# Patient Record
Sex: MALE | Race: WHITE | NOT HISPANIC OR LATINO | Employment: OTHER | URBAN - METROPOLITAN AREA
[De-identification: names, ages, dates, MRNs, and addresses within clinical notes are randomized per-mention and may not be internally consistent; named-entity substitution may affect disease eponyms.]

---

## 2024-03-08 ENCOUNTER — HOSPITAL ENCOUNTER (INPATIENT)
Facility: HOSPITAL | Age: 79
LOS: 11 days | Discharge: NURSING, REHAB OR LONG-TERM CARE | DRG: 811 | End: 2024-03-19
Attending: STUDENT IN AN ORGANIZED HEALTH CARE EDUCATION/TRAINING PROGRAM | Admitting: INTERNAL MEDICINE
Payer: MEDICARE

## 2024-03-08 ENCOUNTER — APPOINTMENT (OUTPATIENT)
Dept: RADIOLOGY | Facility: HOSPITAL | Age: 79
DRG: 811 | End: 2024-03-08
Payer: MEDICARE

## 2024-03-08 ENCOUNTER — APPOINTMENT (OUTPATIENT)
Dept: CARDIOLOGY | Facility: HOSPITAL | Age: 79
DRG: 811 | End: 2024-03-08
Payer: MEDICARE

## 2024-03-08 DIAGNOSIS — D64.9 NORMOCYTIC NORMOCHROMIC ANEMIA: ICD-10-CM

## 2024-03-08 DIAGNOSIS — M65.831 EXTENSOR TENOSYNOVITIS OF RIGHT WRIST: ICD-10-CM

## 2024-03-08 DIAGNOSIS — K92.1 GASTROINTESTINAL HEMORRHAGE WITH MELENA: Primary | ICD-10-CM

## 2024-03-08 DIAGNOSIS — I25.10 CORONARY ARTERY DISEASE, UNSPECIFIED VESSEL OR LESION TYPE, UNSPECIFIED WHETHER ANGINA PRESENT, UNSPECIFIED WHETHER NATIVE OR TRANSPLANTED HEART: Chronic | ICD-10-CM

## 2024-03-08 DIAGNOSIS — E11.9 INSULIN DEPENDENT TYPE 2 DIABETES MELLITUS (MULTI): ICD-10-CM

## 2024-03-08 DIAGNOSIS — J90 PLEURAL EFFUSION, BILATERAL: ICD-10-CM

## 2024-03-08 DIAGNOSIS — N17.9 AKI (ACUTE KIDNEY INJURY) (CMS-HCC): ICD-10-CM

## 2024-03-08 DIAGNOSIS — R60.0 LOCALIZED EDEMA: ICD-10-CM

## 2024-03-08 DIAGNOSIS — G47.00 INSOMNIA, UNSPECIFIED TYPE: ICD-10-CM

## 2024-03-08 DIAGNOSIS — N25.89 UREMIC ACIDOSIS: ICD-10-CM

## 2024-03-08 DIAGNOSIS — J90 BILATERAL PLEURAL EFFUSION: ICD-10-CM

## 2024-03-08 DIAGNOSIS — R13.12 DYSPHAGIA, OROPHARYNGEAL PHASE: ICD-10-CM

## 2024-03-08 DIAGNOSIS — E87.20 METABOLIC ACIDOSIS: ICD-10-CM

## 2024-03-08 DIAGNOSIS — R79.1 SUBTHERAPEUTIC INTERNATIONAL NORMALIZED RATIO (INR): ICD-10-CM

## 2024-03-08 DIAGNOSIS — K59.00 CONSTIPATION, UNSPECIFIED CONSTIPATION TYPE: ICD-10-CM

## 2024-03-08 DIAGNOSIS — Z79.4 INSULIN DEPENDENT TYPE 2 DIABETES MELLITUS (MULTI): ICD-10-CM

## 2024-03-08 DIAGNOSIS — M79.89 SWELLING OF EXTREMITY, RIGHT: ICD-10-CM

## 2024-03-08 DIAGNOSIS — I48.91 ATRIAL FIBRILLATION, UNSPECIFIED TYPE (MULTI): ICD-10-CM

## 2024-03-08 DIAGNOSIS — M79.601 PAIN IN RIGHT ARM: ICD-10-CM

## 2024-03-08 DIAGNOSIS — I10 HYPERTENSION, UNSPECIFIED TYPE: Chronic | ICD-10-CM

## 2024-03-08 PROBLEM — A52.8 LATE LATENT SYPHILIS: Chronic | Status: ACTIVE | Noted: 2024-02-26

## 2024-03-08 PROBLEM — B02.32: Status: ACTIVE | Noted: 2024-03-08

## 2024-03-08 PROBLEM — L03.90 CELLULITIS: Status: ACTIVE | Noted: 2024-02-22

## 2024-03-08 PROBLEM — N52.9 ERECTILE DYSFUNCTION: Status: ACTIVE | Noted: 2017-01-24

## 2024-03-08 PROBLEM — A49.1 STREPTOCOCCOSIS: Status: ACTIVE | Noted: 2024-03-08

## 2024-03-08 PROBLEM — B02.30 HERPES ZOSTER OPHTHALMICUS OF RIGHT EYE: Status: ACTIVE | Noted: 2024-03-08

## 2024-03-08 PROBLEM — D47.2 MGUS (MONOCLONAL GAMMOPATHY OF UNKNOWN SIGNIFICANCE): Status: ACTIVE | Noted: 2024-02-27

## 2024-03-08 PROBLEM — G20.A1 PARKINSON'S DISEASE (MULTI): Chronic | Status: ACTIVE | Noted: 2024-02-22

## 2024-03-08 PROBLEM — Z96.1 PSEUDOPHAKIA OF BOTH EYES: Status: ACTIVE | Noted: 2024-03-08

## 2024-03-08 PROBLEM — G62.9 POLYNEUROPATHY: Status: ACTIVE | Noted: 2023-01-16

## 2024-03-08 LAB
ABO GROUP (TYPE) IN BLOOD: NORMAL
ALBUMIN SERPL BCP-MCNC: 2.1 G/DL (ref 3.4–5)
ALP SERPL-CCNC: 52 U/L (ref 33–136)
ALT SERPL W P-5'-P-CCNC: 3 U/L (ref 10–52)
ANION GAP SERPL CALC-SCNC: 12 MMOL/L (ref 10–20)
ANTIBODY SCREEN: NORMAL
APTT PPP: 42 SECONDS (ref 27–38)
AST SERPL W P-5'-P-CCNC: 8 U/L (ref 9–39)
BASOPHILS # BLD AUTO: 0.03 X10*3/UL (ref 0–0.1)
BASOPHILS NFR BLD AUTO: 0.5 %
BILIRUB SERPL-MCNC: 0.3 MG/DL (ref 0–1.2)
BUN SERPL-MCNC: 69 MG/DL (ref 6–23)
CALCIUM SERPL-MCNC: 8.3 MG/DL (ref 8.6–10.3)
CHLORIDE SERPL-SCNC: 110 MMOL/L (ref 98–107)
CO2 SERPL-SCNC: 21 MMOL/L (ref 21–32)
CREAT SERPL-MCNC: 2.34 MG/DL (ref 0.5–1.3)
EGFRCR SERPLBLD CKD-EPI 2021: 28 ML/MIN/1.73M*2
EOSINOPHIL # BLD AUTO: 0.13 X10*3/UL (ref 0–0.4)
EOSINOPHIL NFR BLD AUTO: 2.1 %
ERYTHROCYTE [DISTWIDTH] IN BLOOD BY AUTOMATED COUNT: 15.6 % (ref 11.5–14.5)
GLUCOSE SERPL-MCNC: 84 MG/DL (ref 74–99)
HCT VFR BLD AUTO: 23.3 % (ref 41–52)
HGB BLD-MCNC: 7.2 G/DL (ref 13.5–17.5)
IMM GRANULOCYTES # BLD AUTO: 0.02 X10*3/UL (ref 0–0.5)
IMM GRANULOCYTES NFR BLD AUTO: 0.3 % (ref 0–0.9)
INR PPP: 1.3 (ref 0.9–1.1)
LYMPHOCYTES # BLD AUTO: 1.36 X10*3/UL (ref 0.8–3)
LYMPHOCYTES NFR BLD AUTO: 22.2 %
MCH RBC QN AUTO: 29.3 PG (ref 26–34)
MCHC RBC AUTO-ENTMCNC: 30.9 G/DL (ref 32–36)
MCV RBC AUTO: 95 FL (ref 80–100)
MONOCYTES # BLD AUTO: 0.39 X10*3/UL (ref 0.05–0.8)
MONOCYTES NFR BLD AUTO: 6.4 %
NEUTROPHILS # BLD AUTO: 4.19 X10*3/UL (ref 1.6–5.5)
NEUTROPHILS NFR BLD AUTO: 68.5 %
NRBC BLD-RTO: 0 /100 WBCS (ref 0–0)
PLATELET # BLD AUTO: 219 X10*3/UL (ref 150–450)
POTASSIUM SERPL-SCNC: 4.7 MMOL/L (ref 3.5–5.3)
PROT SERPL-MCNC: 6.1 G/DL (ref 6.4–8.2)
PROTHROMBIN TIME: 15 SECONDS (ref 9.8–12.8)
RBC # BLD AUTO: 2.46 X10*6/UL (ref 4.5–5.9)
RH FACTOR (ANTIGEN D): NORMAL
SODIUM SERPL-SCNC: 138 MMOL/L (ref 136–145)
WBC # BLD AUTO: 6.1 X10*3/UL (ref 4.4–11.3)

## 2024-03-08 PROCEDURE — 1200000002 HC GENERAL ROOM WITH TELEMETRY DAILY

## 2024-03-08 PROCEDURE — 99285 EMERGENCY DEPT VISIT HI MDM: CPT | Performed by: STUDENT IN AN ORGANIZED HEALTH CARE EDUCATION/TRAINING PROGRAM

## 2024-03-08 PROCEDURE — 36415 COLL VENOUS BLD VENIPUNCTURE: CPT | Performed by: EMERGENCY MEDICINE

## 2024-03-08 PROCEDURE — 86920 COMPATIBILITY TEST SPIN: CPT

## 2024-03-08 PROCEDURE — 86901 BLOOD TYPING SEROLOGIC RH(D): CPT | Mod: 91 | Performed by: EMERGENCY MEDICINE

## 2024-03-08 PROCEDURE — 96376 TX/PRO/DX INJ SAME DRUG ADON: CPT

## 2024-03-08 PROCEDURE — C9113 INJ PANTOPRAZOLE SODIUM, VIA: HCPCS | Performed by: NURSE PRACTITIONER

## 2024-03-08 PROCEDURE — 2500000004 HC RX 250 GENERAL PHARMACY W/ HCPCS (ALT 636 FOR OP/ED): Performed by: NURSE PRACTITIONER

## 2024-03-08 PROCEDURE — 74174 CTA ABD&PLVS W/CONTRAST: CPT

## 2024-03-08 PROCEDURE — 74174 CTA ABD&PLVS W/CONTRAST: CPT | Performed by: RADIOLOGY

## 2024-03-08 PROCEDURE — 2550000001 HC RX 255 CONTRASTS: Performed by: EMERGENCY MEDICINE

## 2024-03-08 PROCEDURE — 99223 1ST HOSP IP/OBS HIGH 75: CPT | Performed by: NURSE PRACTITIONER

## 2024-03-08 PROCEDURE — 99285 EMERGENCY DEPT VISIT HI MDM: CPT | Mod: 25

## 2024-03-08 PROCEDURE — 85730 THROMBOPLASTIN TIME PARTIAL: CPT | Mod: 91 | Performed by: EMERGENCY MEDICINE

## 2024-03-08 PROCEDURE — 80053 COMPREHEN METABOLIC PANEL: CPT | Performed by: EMERGENCY MEDICINE

## 2024-03-08 PROCEDURE — C9113 INJ PANTOPRAZOLE SODIUM, VIA: HCPCS | Performed by: EMERGENCY MEDICINE

## 2024-03-08 PROCEDURE — 93005 ELECTROCARDIOGRAM TRACING: CPT

## 2024-03-08 PROCEDURE — 85025 COMPLETE CBC W/AUTO DIFF WBC: CPT | Performed by: EMERGENCY MEDICINE

## 2024-03-08 PROCEDURE — 96374 THER/PROPH/DIAG INJ IV PUSH: CPT

## 2024-03-08 PROCEDURE — 2500000004 HC RX 250 GENERAL PHARMACY W/ HCPCS (ALT 636 FOR OP/ED): Performed by: EMERGENCY MEDICINE

## 2024-03-08 RX ORDER — ATORVASTATIN CALCIUM 80 MG/1
80 TABLET, FILM COATED ORAL NIGHTLY
COMMUNITY

## 2024-03-08 RX ORDER — INSULIN LISPRO 100 [IU]/ML
4 INJECTION, SOLUTION INTRAVENOUS; SUBCUTANEOUS
COMMUNITY
End: 2024-03-19 | Stop reason: HOSPADM

## 2024-03-08 RX ORDER — IPRATROPIUM BROMIDE AND ALBUTEROL SULFATE 2.5; .5 MG/3ML; MG/3ML
3 SOLUTION RESPIRATORY (INHALATION) EVERY 2 HOUR PRN
Status: DISCONTINUED | OUTPATIENT
Start: 2024-03-08 | End: 2024-03-19 | Stop reason: HOSPADM

## 2024-03-08 RX ORDER — POLYETHYLENE GLYCOL 3350 17 G/17G
17 POWDER, FOR SOLUTION ORAL DAILY PRN
Status: DISCONTINUED | OUTPATIENT
Start: 2024-03-08 | End: 2024-03-19 | Stop reason: HOSPADM

## 2024-03-08 RX ORDER — GLIPIZIDE 5 MG/1
2.5 TABLET ORAL
Status: DISCONTINUED | OUTPATIENT
Start: 2024-03-09 | End: 2024-03-19 | Stop reason: HOSPADM

## 2024-03-08 RX ORDER — SODIUM CHLORIDE 0.9 % (FLUSH) 0.9 %
10 SYRINGE (ML) INJECTION 2 TIMES DAILY
Status: DISCONTINUED | OUTPATIENT
Start: 2024-03-08 | End: 2024-03-19 | Stop reason: HOSPADM

## 2024-03-08 RX ORDER — PANTOPRAZOLE SODIUM 40 MG/10ML
40 INJECTION, POWDER, LYOPHILIZED, FOR SOLUTION INTRAVENOUS ONCE
Status: COMPLETED | OUTPATIENT
Start: 2024-03-08 | End: 2024-03-08

## 2024-03-08 RX ORDER — CHOLECALCIFEROL (VITAMIN D3) 25 MCG
4000 TABLET ORAL DAILY
COMMUNITY

## 2024-03-08 RX ORDER — PANTOPRAZOLE SODIUM 40 MG/10ML
40 INJECTION, POWDER, LYOPHILIZED, FOR SOLUTION INTRAVENOUS 2 TIMES DAILY
Status: DISCONTINUED | OUTPATIENT
Start: 2024-03-09 | End: 2024-03-15

## 2024-03-08 RX ORDER — DEXTROSE 50 % IN WATER (D50W) INTRAVENOUS SYRINGE
25
Status: DISCONTINUED | OUTPATIENT
Start: 2024-03-08 | End: 2024-03-11

## 2024-03-08 RX ORDER — NITROGLYCERIN 0.4 MG/1
0.4 TABLET SUBLINGUAL EVERY 5 MIN PRN
COMMUNITY
End: 2024-03-19 | Stop reason: HOSPADM

## 2024-03-08 RX ORDER — GLIPIZIDE 5 MG/1
2.5 TABLET ORAL
COMMUNITY
End: 2024-03-19 | Stop reason: HOSPADM

## 2024-03-08 RX ORDER — ADHESIVE BANDAGE
30 BANDAGE TOPICAL DAILY PRN
COMMUNITY
End: 2024-03-19 | Stop reason: HOSPADM

## 2024-03-08 RX ORDER — WARFARIN 2.5 MG/1
2.5 TABLET ORAL NIGHTLY
COMMUNITY

## 2024-03-08 RX ORDER — INSULIN LISPRO 100 [IU]/ML
0-10 INJECTION, SOLUTION INTRAVENOUS; SUBCUTANEOUS EVERY 4 HOURS
Status: DISCONTINUED | OUTPATIENT
Start: 2024-03-08 | End: 2024-03-10

## 2024-03-08 RX ORDER — BISACODYL 10 MG/1
10 SUPPOSITORY RECTAL DAILY PRN
COMMUNITY

## 2024-03-08 RX ORDER — TALC
3 POWDER (GRAM) TOPICAL DAILY PRN
Status: DISCONTINUED | OUTPATIENT
Start: 2024-03-08 | End: 2024-03-19 | Stop reason: HOSPADM

## 2024-03-08 RX ORDER — METOPROLOL SUCCINATE 50 MG/1
50 TABLET, EXTENDED RELEASE ORAL DAILY
Status: DISCONTINUED | OUTPATIENT
Start: 2024-03-09 | End: 2024-03-19 | Stop reason: HOSPADM

## 2024-03-08 RX ORDER — ENOXAPARIN SODIUM 100 MG/ML
0.95 INJECTION SUBCUTANEOUS DAILY
COMMUNITY
End: 2024-03-19 | Stop reason: HOSPADM

## 2024-03-08 RX ORDER — BRIMONIDINE TARTRATE 2 MG/ML
1 SOLUTION/ DROPS OPHTHALMIC 2 TIMES DAILY
Status: DISCONTINUED | OUTPATIENT
Start: 2024-03-08 | End: 2024-03-19 | Stop reason: HOSPADM

## 2024-03-08 RX ORDER — SODIUM CHLORIDE 9 MG/ML
100 INJECTION, SOLUTION INTRAVENOUS CONTINUOUS
Status: ACTIVE | OUTPATIENT
Start: 2024-03-08 | End: 2024-03-09

## 2024-03-08 RX ORDER — LANOLIN ALCOHOL/MO/W.PET/CERES
100 CREAM (GRAM) TOPICAL DAILY
COMMUNITY

## 2024-03-08 RX ORDER — GABAPENTIN 100 MG/1
100 CAPSULE ORAL 2 TIMES DAILY
COMMUNITY

## 2024-03-08 RX ORDER — ACETAMINOPHEN 325 MG/1
650 TABLET ORAL EVERY 4 HOURS PRN
Status: DISCONTINUED | OUTPATIENT
Start: 2024-03-08 | End: 2024-03-19 | Stop reason: HOSPADM

## 2024-03-08 RX ORDER — BRIMONIDINE TARTRATE 2 MG/ML
1 SOLUTION/ DROPS OPHTHALMIC 2 TIMES DAILY
COMMUNITY

## 2024-03-08 RX ORDER — METOPROLOL SUCCINATE 50 MG/1
50 TABLET, EXTENDED RELEASE ORAL DAILY
COMMUNITY

## 2024-03-08 RX ORDER — ALUMINUM HYDROXIDE, MAGNESIUM HYDROXIDE, AND SIMETHICONE 1200; 120; 1200 MG/30ML; MG/30ML; MG/30ML
15 SUSPENSION ORAL EVERY 6 HOURS PRN
COMMUNITY
End: 2024-03-19 | Stop reason: HOSPADM

## 2024-03-08 RX ORDER — CEFTRIAXONE 2 G/50ML
2 INJECTION, SOLUTION INTRAVENOUS EVERY 24 HOURS
Status: DISCONTINUED | OUTPATIENT
Start: 2024-03-09 | End: 2024-03-19 | Stop reason: HOSPADM

## 2024-03-08 RX ORDER — CEFTRIAXONE SODIUM 2 G/1
2 INJECTION, POWDER, FOR SOLUTION INTRAVENOUS EVERY 24 HOURS
Status: DISCONTINUED | OUTPATIENT
Start: 2024-03-08 | End: 2024-03-08

## 2024-03-08 RX ORDER — SODIUM CHLORIDE 0.9 % (FLUSH) 0.9 %
10 SYRINGE (ML) INJECTION 2 TIMES DAILY
COMMUNITY

## 2024-03-08 RX ORDER — CHOLECALCIFEROL (VITAMIN D3) 25 MCG
4000 TABLET ORAL DAILY
Status: DISCONTINUED | OUTPATIENT
Start: 2024-03-09 | End: 2024-03-19 | Stop reason: HOSPADM

## 2024-03-08 RX ORDER — CARBIDOPA AND LEVODOPA 25; 100 MG/1; MG/1
1 TABLET ORAL 2 TIMES DAILY
COMMUNITY

## 2024-03-08 RX ORDER — ATORVASTATIN CALCIUM 80 MG/1
80 TABLET, FILM COATED ORAL NIGHTLY
Status: DISCONTINUED | OUTPATIENT
Start: 2024-03-09 | End: 2024-03-19 | Stop reason: HOSPADM

## 2024-03-08 RX ORDER — GABAPENTIN 100 MG/1
100 CAPSULE ORAL 2 TIMES DAILY
Status: DISCONTINUED | OUTPATIENT
Start: 2024-03-08 | End: 2024-03-19 | Stop reason: HOSPADM

## 2024-03-08 RX ORDER — CEFTRIAXONE SODIUM 2 G/1
2 INJECTION, POWDER, FOR SOLUTION INTRAVENOUS EVERY 24 HOURS
COMMUNITY
Start: 2024-03-07 | End: 2024-03-19 | Stop reason: HOSPADM

## 2024-03-08 RX ORDER — CARBIDOPA AND LEVODOPA 25; 100 MG/1; MG/1
1 TABLET ORAL 2 TIMES DAILY
Status: DISCONTINUED | OUTPATIENT
Start: 2024-03-08 | End: 2024-03-19 | Stop reason: HOSPADM

## 2024-03-08 RX ORDER — ACETAMINOPHEN 325 MG/1
650 TABLET ORAL EVERY 4 HOURS PRN
COMMUNITY

## 2024-03-08 RX ORDER — LANOLIN ALCOHOL/MO/W.PET/CERES
100 CREAM (GRAM) TOPICAL DAILY
Status: DISCONTINUED | OUTPATIENT
Start: 2024-03-09 | End: 2024-03-19 | Stop reason: HOSPADM

## 2024-03-08 RX ORDER — INSULIN LISPRO 100 [IU]/ML
4 INJECTION, SOLUTION INTRAVENOUS; SUBCUTANEOUS
Status: DISCONTINUED | OUTPATIENT
Start: 2024-03-09 | End: 2024-03-19 | Stop reason: HOSPADM

## 2024-03-08 RX ADMIN — IOHEXOL 90 ML: 350 INJECTION, SOLUTION INTRAVENOUS at 19:30

## 2024-03-08 RX ADMIN — PANTOPRAZOLE SODIUM 40 MG: 40 INJECTION, POWDER, FOR SOLUTION INTRAVENOUS at 16:55

## 2024-03-08 RX ADMIN — SODIUM CHLORIDE 1000 ML: 9 INJECTION, SOLUTION INTRAVENOUS at 19:19

## 2024-03-08 RX ADMIN — PANTOPRAZOLE SODIUM 40 MG: 40 INJECTION, POWDER, FOR SOLUTION INTRAVENOUS at 21:20

## 2024-03-08 SDOH — SOCIAL STABILITY: SOCIAL INSECURITY: ARE YOU OR HAVE YOU BEEN THREATENED OR ABUSED PHYSICALLY, EMOTIONALLY, OR SEXUALLY BY ANYONE?: NO

## 2024-03-08 SDOH — SOCIAL STABILITY: SOCIAL INSECURITY: ABUSE: ADULT

## 2024-03-08 SDOH — SOCIAL STABILITY: SOCIAL INSECURITY: WERE YOU ABLE TO COMPLETE ALL THE BEHAVIORAL HEALTH SCREENINGS?: YES

## 2024-03-08 SDOH — SOCIAL STABILITY: SOCIAL INSECURITY: DOES ANYONE TRY TO KEEP YOU FROM HAVING/CONTACTING OTHER FRIENDS OR DOING THINGS OUTSIDE YOUR HOME?: NO

## 2024-03-08 SDOH — SOCIAL STABILITY: SOCIAL INSECURITY: DO YOU FEEL ANYONE HAS EXPLOITED OR TAKEN ADVANTAGE OF YOU FINANCIALLY OR OF YOUR PERSONAL PROPERTY?: NO

## 2024-03-08 SDOH — SOCIAL STABILITY: SOCIAL INSECURITY: DO YOU FEEL UNSAFE GOING BACK TO THE PLACE WHERE YOU ARE LIVING?: NO

## 2024-03-08 SDOH — SOCIAL STABILITY: SOCIAL INSECURITY: HAVE YOU HAD THOUGHTS OF HARMING ANYONE ELSE?: NO

## 2024-03-08 SDOH — SOCIAL STABILITY: SOCIAL INSECURITY: HAS ANYONE EVER THREATENED TO HURT YOUR FAMILY OR YOUR PETS?: NO

## 2024-03-08 SDOH — SOCIAL STABILITY: SOCIAL INSECURITY: ARE THERE ANY APPARENT SIGNS OF INJURIES/BEHAVIORS THAT COULD BE RELATED TO ABUSE/NEGLECT?: NO

## 2024-03-08 ASSESSMENT — LIFESTYLE VARIABLES
AUDIT-C TOTAL SCORE: 0
HAVE YOU EVER FELT YOU SHOULD CUT DOWN ON YOUR DRINKING: NO
AUDIT-C TOTAL SCORE: 0
SUBSTANCE_ABUSE_PAST_12_MONTHS: NO
HOW OFTEN DO YOU HAVE A DRINK CONTAINING ALCOHOL: NEVER
PRESCIPTION_ABUSE_PAST_12_MONTHS: NO
HOW OFTEN DO YOU HAVE 6 OR MORE DRINKS ON ONE OCCASION: NEVER
EVER FELT BAD OR GUILTY ABOUT YOUR DRINKING: NO
SKIP TO QUESTIONS 9-10: 1
EVER HAD A DRINK FIRST THING IN THE MORNING TO STEADY YOUR NERVES TO GET RID OF A HANGOVER: NO
HAVE PEOPLE ANNOYED YOU BY CRITICIZING YOUR DRINKING: NO
HOW MANY STANDARD DRINKS CONTAINING ALCOHOL DO YOU HAVE ON A TYPICAL DAY: PATIENT DOES NOT DRINK

## 2024-03-08 ASSESSMENT — PAIN - FUNCTIONAL ASSESSMENT: PAIN_FUNCTIONAL_ASSESSMENT: 0-10

## 2024-03-08 ASSESSMENT — ACTIVITIES OF DAILY LIVING (ADL)
HEARING - LEFT EAR: DIFFICULTY WITH NOISE
WALKS IN HOME: NEEDS ASSISTANCE
JUDGMENT_ADEQUATE_SAFELY_COMPLETE_DAILY_ACTIVITIES: YES
FEEDING YOURSELF: NEEDS ASSISTANCE
DRESSING YOURSELF: NEEDS ASSISTANCE
PATIENT'S MEMORY ADEQUATE TO SAFELY COMPLETE DAILY ACTIVITIES?: YES
GROOMING: NEEDS ASSISTANCE
BATHING: NEEDS ASSISTANCE
TOILETING: NEEDS ASSISTANCE
ADEQUATE_TO_COMPLETE_ADL: YES
HEARING - RIGHT EAR: DIFFICULTY WITH NOISE
ASSISTIVE_DEVICE: WALKER;WHEELCHAIR

## 2024-03-08 ASSESSMENT — COLUMBIA-SUICIDE SEVERITY RATING SCALE - C-SSRS
1. IN THE PAST MONTH, HAVE YOU WISHED YOU WERE DEAD OR WISHED YOU COULD GO TO SLEEP AND NOT WAKE UP?: NO
2. HAVE YOU ACTUALLY HAD ANY THOUGHTS OF KILLING YOURSELF?: NO
6. HAVE YOU EVER DONE ANYTHING, STARTED TO DO ANYTHING, OR PREPARED TO DO ANYTHING TO END YOUR LIFE?: NO

## 2024-03-08 ASSESSMENT — PATIENT HEALTH QUESTIONNAIRE - PHQ9
SUM OF ALL RESPONSES TO PHQ9 QUESTIONS 1 & 2: 0
1. LITTLE INTEREST OR PLEASURE IN DOING THINGS: NOT AT ALL
2. FEELING DOWN, DEPRESSED OR HOPELESS: NOT AT ALL

## 2024-03-08 ASSESSMENT — PAIN SCALES - GENERAL: PAINLEVEL_OUTOF10: 0 - NO PAIN

## 2024-03-08 NOTE — ED PROVIDER NOTES
EMERGENCY DEPARTMENT ENCOUNTER      Pt Name: Vamsi Benson  MRN: [id number]  Birthdate 1945  Date of evaluation: 3/8/2024  Provider: Brendan Covington DO    CHIEF COMPLAINT       Chief Complaint   Patient presents with    Abnormal Lab     HISTORY OF PRESENT ILLNESS    Vamsi Benson is a 78 y.o. year old male who presents to the ER for anemia.  Per EMS he is coming from [facility], had a low H&H, they were called to bring him for transfusion.  The patient states that he has had melena for 2-3 days, he has been weak and tired for 6 months, he has had 4-5 days of loss of appetite.  Denies chest pain, shortness of breath, abdominal pain, nausea or vomiting, fever.  PMH A-fib on Coumadin, insulin-dependent diabetes, HTN, HLD, Parkinson's, CVA, CAD, infective tenosynovitis  PSH CABG, cardiac stents  Allergic to codeine, erythromycin, meperidine, metformin, oxycodone, penicillin  Full code  PAST MEDICAL HISTORY     Past Medical History:   Diagnosis Date    Personal history of other diseases of the nervous system and sense organs 06/17/2019    History of glaucoma    Personal history of other endocrine, nutritional and metabolic disease 06/17/2019    History of diabetes mellitus     CURRENT MEDICATIONS       Previous Medications    No medications on file     SURGICAL HISTORY       Past Surgical History:   Procedure Laterality Date    OTHER SURGICAL HISTORY  06/17/2019    Cataract surgery     ALLERGIES     Codeine, Erythromycin, Meperidine, Metformin, Oxycodone, and Penicillins  FAMILY HISTORY     No family history on file.  SOCIAL HISTORY       Social History     Tobacco Use    Smoking status: Not on file    Smokeless tobacco: Not on file   Substance Use Topics    Alcohol use: Not on file    Drug use: Not on file     PHYSICAL EXAM  (up to 7 for level 4, 8 or more for level 5)     ED Triage Vitals [03/08/24 1636]   Temperature Heart Rate Respirations BP   37.4 °C (99.3 °F) 72 20 157/68      Pulse Ox Temp Source Heart Rate Source  Patient Position   95 % Temporal Monitor Lying      BP Location FiO2 (%)     Left arm --       Physical Exam  Vitals and nursing note reviewed.   Constitutional:       General: He is not in acute distress.     Appearance: Normal appearance. He is not ill-appearing.   HENT:      Head: Normocephalic and atraumatic. No contusion or laceration.      Jaw: No trismus or malocclusion.      Right Ear: External ear normal.      Left Ear: External ear normal.      Mouth/Throat:      Mouth: Mucous membranes are moist.      Pharynx: Oropharynx is clear.   Eyes:      Extraocular Movements: Extraocular movements intact.      Pupils: Pupils are equal, round, and reactive to light.   Neck:      Trachea: No tracheal deviation.   Cardiovascular:      Rate and Rhythm: Normal rate. Rhythm irregular.      Pulses: Normal pulses.   Pulmonary:      Effort: No respiratory distress.      Breath sounds: No wheezing, rhonchi or rales.   Chest:      Chest wall: No tenderness.   Abdominal:      General: Abdomen is flat. There is no distension.      Palpations: Abdomen is soft. There is no mass.      Tenderness: There is no abdominal tenderness. There is no right CVA tenderness or left CVA tenderness.   Musculoskeletal:         General: Swelling (Right hand) and tenderness (Right hand) present. No signs of injury.      Cervical back: Normal range of motion. No tenderness.      Right lower leg: No edema.      Left lower leg: No edema.   Skin:     Coloration: Skin is not jaundiced or pale.      Findings: No rash or wound.   Neurological:      General: No focal deficit present.      Mental Status: He is alert and oriented to person, place, and time. Mental status is at baseline.   Psychiatric:         Speech: Speech normal.         Behavior: Behavior normal.         Thought Content: Thought content does not include homicidal or suicidal ideation.         Judgment: Judgment normal.        DIAGNOSTIC RESULTS   LABS:  Labs Reviewed   CBC WITH AUTO  DIFFERENTIAL - Abnormal       Result Value    WBC 6.1      nRBC 0.0      RBC 2.46 (*)     Hemoglobin 7.2 (*)     Hematocrit 23.3 (*)     MCV 95      MCH 29.3      MCHC 30.9 (*)     RDW 15.6 (*)     Platelets 219      Neutrophils % 68.5      Immature Granulocytes %, Automated 0.3      Lymphocytes % 22.2      Monocytes % 6.4      Eosinophils % 2.1      Basophils % 0.5      Neutrophils Absolute 4.19      Immature Granulocytes Absolute, Automated 0.02      Lymphocytes Absolute 1.36      Monocytes Absolute 0.39      Eosinophils Absolute 0.13      Basophils Absolute 0.03     COMPREHENSIVE METABOLIC PANEL - Abnormal    Glucose 84      Sodium 138      Potassium 4.7      Chloride 110 (*)     Bicarbonate 21      Anion Gap 12      Urea Nitrogen 69 (*)     Creatinine 2.34 (*)     eGFR 28 (*)     Calcium 8.3 (*)     Albumin 2.1 (*)     Alkaline Phosphatase 52      Total Protein 6.1 (*)     AST 8 (*)     Bilirubin, Total 0.3      ALT 3 (*)    COAGULATION SCREEN - Abnormal    Protime 15.0 (*)     INR 1.3 (*)     aPTT 42 (*)     Narrative:     The APTT is no longer used for monitoring Unfractionated Heparin Therapy. For monitoring Heparin Therapy, use the Heparin Assay.   TYPE AND SCREEN    ABO TYPE O      Rh TYPE POS      ANTIBODY SCREEN NEG       All other labs were within normal range or not returned as of this dictation.  Imaging  CT angio abdomen pelvis w and or wo IV IV contrast   Final Result   Evaluation is limited secondary to underdistention, there is however   evidence of gastric wall thickening concerning for gastritis,   follow-up upper endoscopy is recommended for further evaluation and   to exclude other underlying pathology.        No evidence of separation contrast bowel lumen to suggest acute   active gastrointestinal bleed.        Colonic diverticulosis without evidence of acute diverticulitis.        Underdistention versus mild urinary bladder wall thickening; please   correlate urinalysis to exclude cystitis.         Partially imaged large bilateral pleural effusions and bibasilar   atelectatic/consolidative opacities.        MACRO:   None        Signed by: John Rodriguez 3/8/2024 8:11 PM   Dictation workstation:   [id number]         Procedure  Procedures  EMERGENCY DEPARTMENT COURSE/MDM:   Medical Decision Making    Vitals:    Vitals:    03/08/24 1830 03/08/24 1900 03/08/24 2010 03/08/24 2030   BP: 155/69 146/71 135/70 154/74   BP Location:       Patient Position:       Pulse: 73 69 83 85   Resp: 18 17 (!) 22 (!) 22   Temp:       TempSrc:       SpO2: 97% 96% (!) 93% (!) 92%   Weight:       Height:         Vamsi Benson is a male 78 y.o. who presents to the ER for anemia. On arrival the patients vital signs were: Afebrile, Reguar HR, Normotensive, Regular RR, and Oxygenating well on room air. History obtained from: patient and EMS .  Given melena on Coumadin, CBC CMP coags type and screen ordered along with CT angio abdomen pelvis to assess for intraluminal bleeding.  Protonix initiated for potential GI bleed.      ED Course as of 03/08/24 2101   Fri Mar 08, 2024   1717 Coagulation Screen(!)  Subtherapeutic INR [CB]   1826 Discussed with Dr. Eastman, radiology, who approved use of contrast, highlighting that the patient may have slight increase in creatinine.  Plan for 1 L NS fluid administration to minimize potential contrast associated nephropathy [CB]   1951 Comprehensive metabolic panel(!)  BUN and creatinine both elevated, decreased from 2 days ago on chart review, however significantly worsened from 2/21/24, no acute electrolyte or hepatic abnormalities [CB]   1951 CBC and Auto Differential(!)  Hemoglobin 7.2 stable from 2 days ago, however significantly decreased from 2/21/2024, no significant white blood cell or platelet derangement. [CB]   2017 CT angio abdomen pelvis w and or wo IV IV contrast  Evaluation is limited secondary to  underdistention, there is however evidence of gastric wall thickening concerning  for gastritis, follow-up upper endoscopy is recommended for further evaluation and to exclude other underlying pathology.      No evidence of separation contrast bowel lumen to suggest acute active gastrointestinal bleed.      Colonic diverticulosis without evidence of acute diverticulitis.      Underdistention versus mild urinary bladder wall thickening; please correlate urinalysis to exclude cystitis.      Partially imaged large bilateral pleural effusions and bibasilar atelectatic/consolidative opacities. [CB]   2038 Discussed with gastroenterology, Dr. Beatty, he stated that he may scope the patient Monday if he is admitted, would scope him sooner if he becomes unstable but he would not scope him over the weekend unless he becomes unstable. [CB]      ED Course User Index  [CB] Brendan Covington,          Diagnoses as of 03/08/24 2101   Gastrointestinal hemorrhage with melena   Subtherapeutic international normalized ratio (INR)   Normocytic normochromic anemia   Bilateral pleural effusion     External Records Reviewed: I reviewed recent and relevant outside records including inpatient notes, outpatient records    Shared decision making for disposition  Patient and/or patient´s representative was counseled regarding labs, imaging, likely diagnosis. All questions were answered. Recommendation was made   for Admission given the need for further escalation of care to inpatient management. Patient agreed and was admitted in stable condition. Admitting team was notified of any pending labs or imaging to ensure continuity of care.     ED Medications administered this visit:    Medications   pantoprazole (ProtoNix) injection 40 mg (40 mg intravenous Given 3/8/24 1655)   sodium chloride 0.9 % bolus 1,000 mL (1,000 mL intravenous New Bag 3/8/24 1919)   iohexol (OMNIPaque) 350 mg iodine/mL solution 90 mL (90 mL intravenous Given 3/8/24 1930)     Final Impression:   1. Gastrointestinal hemorrhage with melena    2.  Subtherapeutic international normalized ratio (INR)    3. Normocytic normochromic anemia    4. Bilateral pleural effusion          Please excuse any misspellings or unintended errors related to the Dragon speech recognition software used to dictate this note.    I reviewed the case with the attending ED physician. The attending ED physician agrees with the plan.      Brendan Covington DO  Resident  03/08/24 2350

## 2024-03-09 ENCOUNTER — APPOINTMENT (OUTPATIENT)
Dept: CARDIOLOGY | Facility: HOSPITAL | Age: 79
DRG: 811 | End: 2024-03-09
Payer: MEDICARE

## 2024-03-09 PROBLEM — M79.89 SWELLING OF EXTREMITY, RIGHT: Status: ACTIVE | Noted: 2024-03-09

## 2024-03-09 PROBLEM — N17.9 AKI (ACUTE KIDNEY INJURY) (CMS-HCC): Status: ACTIVE | Noted: 2024-03-09

## 2024-03-09 PROBLEM — J90 PLEURAL EFFUSION, BILATERAL: Status: ACTIVE | Noted: 2024-03-09

## 2024-03-09 LAB
ANION GAP SERPL CALC-SCNC: 14 MMOL/L (ref 10–20)
APPEARANCE UR: ABNORMAL
BILIRUB UR STRIP.AUTO-MCNC: NEGATIVE MG/DL
BUN SERPL-MCNC: 65 MG/DL (ref 6–23)
CALCIUM SERPL-MCNC: 8 MG/DL (ref 8.6–10.3)
CHLORIDE SERPL-SCNC: 112 MMOL/L (ref 98–107)
CHLORIDE UR-SCNC: 40 MMOL/L
CHLORIDE/CREATININE (MMOL/G) IN URINE: 61 MMOL/G CREAT (ref 23–275)
CK SERPL-CCNC: 17 U/L (ref 0–325)
CO2 SERPL-SCNC: 19 MMOL/L (ref 21–32)
COLOR UR: YELLOW
CREAT SERPL-MCNC: 2.32 MG/DL (ref 0.5–1.3)
CREAT UR-MCNC: 65.6 MG/DL (ref 20–370)
CREAT UR-MCNC: 65.6 MG/DL (ref 20–370)
CRP SERPL-MCNC: 6.16 MG/DL
EGFRCR SERPLBLD CKD-EPI 2021: 28 ML/MIN/1.73M*2
ERYTHROCYTE [DISTWIDTH] IN BLOOD BY AUTOMATED COUNT: 15.7 % (ref 11.5–14.5)
GLUCOSE BLD MANUAL STRIP-MCNC: 107 MG/DL (ref 74–99)
GLUCOSE BLD MANUAL STRIP-MCNC: 109 MG/DL (ref 74–99)
GLUCOSE BLD MANUAL STRIP-MCNC: 110 MG/DL (ref 74–99)
GLUCOSE BLD MANUAL STRIP-MCNC: 116 MG/DL (ref 74–99)
GLUCOSE SERPL-MCNC: 117 MG/DL (ref 74–99)
GLUCOSE UR STRIP.AUTO-MCNC: ABNORMAL MG/DL
HCT VFR BLD AUTO: 22.8 % (ref 41–52)
HCT VFR BLD AUTO: 27.2 % (ref 41–52)
HGB BLD-MCNC: 7 G/DL (ref 13.5–17.5)
HGB BLD-MCNC: 8 G/DL (ref 13.5–17.5)
HOLD SPECIMEN: NORMAL
HOLD SPECIMEN: NORMAL
KETONES UR STRIP.AUTO-MCNC: ABNORMAL MG/DL
LEUKOCYTE ESTERASE UR QL STRIP.AUTO: ABNORMAL
MAGNESIUM SERPL-MCNC: 2.1 MG/DL (ref 1.6–2.4)
MCH RBC QN AUTO: 29.2 PG (ref 26–34)
MCHC RBC AUTO-ENTMCNC: 30.7 G/DL (ref 32–36)
MCV RBC AUTO: 95 FL (ref 80–100)
NITRITE UR QL STRIP.AUTO: NEGATIVE
NRBC BLD-RTO: 0 /100 WBCS (ref 0–0)
PH UR STRIP.AUTO: 5 [PH]
PHOSPHATE SERPL-MCNC: 5.5 MG/DL (ref 2.5–4.9)
PLATELET # BLD AUTO: 217 X10*3/UL (ref 150–450)
POTASSIUM SERPL-SCNC: 5 MMOL/L (ref 3.5–5.3)
PROCALCITONIN SERPL-MCNC: 0.37 NG/ML
PROT UR STRIP.AUTO-MCNC: ABNORMAL MG/DL
RBC # BLD AUTO: 2.4 X10*6/UL (ref 4.5–5.9)
RBC # UR STRIP.AUTO: ABNORMAL /UL
RBC #/AREA URNS AUTO: >20 /HPF
SODIUM SERPL-SCNC: 140 MMOL/L (ref 136–145)
SODIUM UR-SCNC: 35 MMOL/L
SODIUM/CREAT UR-RTO: 53 MMOL/G CREAT
SP GR UR STRIP.AUTO: 1.03
URATE SERPL-MCNC: 8.7 MG/DL (ref 4–7.5)
UROBILINOGEN UR STRIP.AUTO-MCNC: <2 MG/DL
WBC # BLD AUTO: 6.5 X10*3/UL (ref 4.4–11.3)
WBC #/AREA URNS AUTO: >50 /HPF
YEAST BUDDING #/AREA UR COMP ASSIST: PRESENT /HPF

## 2024-03-09 PROCEDURE — 2500000004 HC RX 250 GENERAL PHARMACY W/ HCPCS (ALT 636 FOR OP/ED): Performed by: NURSE PRACTITIONER

## 2024-03-09 PROCEDURE — 87040 BLOOD CULTURE FOR BACTERIA: CPT | Mod: STJLAB | Performed by: NURSE PRACTITIONER

## 2024-03-09 PROCEDURE — 2500000005 HC RX 250 GENERAL PHARMACY W/O HCPCS: Performed by: NURSE PRACTITIONER

## 2024-03-09 PROCEDURE — 99222 1ST HOSP IP/OBS MODERATE 55: CPT | Performed by: INTERNAL MEDICINE

## 2024-03-09 PROCEDURE — 85027 COMPLETE CBC AUTOMATED: CPT | Performed by: NURSE PRACTITIONER

## 2024-03-09 PROCEDURE — 82570 ASSAY OF URINE CREATININE: CPT | Performed by: INTERNAL MEDICINE

## 2024-03-09 PROCEDURE — 93971 EXTREMITY STUDY: CPT | Performed by: SURGERY

## 2024-03-09 PROCEDURE — 87086 URINE CULTURE/COLONY COUNT: CPT | Mod: STJLAB | Performed by: NURSE PRACTITIONER

## 2024-03-09 PROCEDURE — 83735 ASSAY OF MAGNESIUM: CPT | Performed by: NURSE PRACTITIONER

## 2024-03-09 PROCEDURE — 86140 C-REACTIVE PROTEIN: CPT | Performed by: NURSE PRACTITIONER

## 2024-03-09 PROCEDURE — 82947 ASSAY GLUCOSE BLOOD QUANT: CPT

## 2024-03-09 PROCEDURE — 84145 PROCALCITONIN (PCT): CPT | Mod: STJLAB | Performed by: NURSE PRACTITIONER

## 2024-03-09 PROCEDURE — 81001 URINALYSIS AUTO W/SCOPE: CPT | Performed by: NURSE PRACTITIONER

## 2024-03-09 PROCEDURE — 84100 ASSAY OF PHOSPHORUS: CPT | Performed by: NURSE PRACTITIONER

## 2024-03-09 PROCEDURE — 85014 HEMATOCRIT: CPT | Performed by: NURSE PRACTITIONER

## 2024-03-09 PROCEDURE — 80048 BASIC METABOLIC PNL TOTAL CA: CPT | Performed by: NURSE PRACTITIONER

## 2024-03-09 PROCEDURE — 84550 ASSAY OF BLOOD/URIC ACID: CPT | Performed by: INTERNAL MEDICINE

## 2024-03-09 PROCEDURE — 99221 1ST HOSP IP/OBS SF/LOW 40: CPT | Performed by: INTERNAL MEDICINE

## 2024-03-09 PROCEDURE — 2500000002 HC RX 250 W HCPCS SELF ADMINISTERED DRUGS (ALT 637 FOR MEDICARE OP, ALT 636 FOR OP/ED): Performed by: NURSE PRACTITIONER

## 2024-03-09 PROCEDURE — 82550 ASSAY OF CK (CPK): CPT | Performed by: INTERNAL MEDICINE

## 2024-03-09 PROCEDURE — 2500000001 HC RX 250 WO HCPCS SELF ADMINISTERED DRUGS (ALT 637 FOR MEDICARE OP): Performed by: NURSE PRACTITIONER

## 2024-03-09 PROCEDURE — 1200000002 HC GENERAL ROOM WITH TELEMETRY DAILY

## 2024-03-09 PROCEDURE — C9113 INJ PANTOPRAZOLE SODIUM, VIA: HCPCS | Performed by: NURSE PRACTITIONER

## 2024-03-09 PROCEDURE — 36415 COLL VENOUS BLD VENIPUNCTURE: CPT | Performed by: NURSE PRACTITIONER

## 2024-03-09 PROCEDURE — 2500000004 HC RX 250 GENERAL PHARMACY W/ HCPCS (ALT 636 FOR OP/ED): Performed by: INTERNAL MEDICINE

## 2024-03-09 PROCEDURE — 82436 ASSAY OF URINE CHLORIDE: CPT | Performed by: INTERNAL MEDICINE

## 2024-03-09 PROCEDURE — 93971 EXTREMITY STUDY: CPT

## 2024-03-09 RX ORDER — SODIUM BICARBONATE 650 MG/1
650 TABLET ORAL 2 TIMES DAILY
Status: DISCONTINUED | OUTPATIENT
Start: 2024-03-09 | End: 2024-03-19

## 2024-03-09 RX ADMIN — CARBIDOPA AND LEVODOPA 1 TABLET: 25; 100 TABLET ORAL at 09:54

## 2024-03-09 RX ADMIN — ATORVASTATIN CALCIUM 80 MG: 80 TABLET, FILM COATED ORAL at 20:22

## 2024-03-09 RX ADMIN — SODIUM CHLORIDE 100 ML/HR: 9 INJECTION, SOLUTION INTRAVENOUS at 02:05

## 2024-03-09 RX ADMIN — Medication 2 L/MIN: at 20:00

## 2024-03-09 RX ADMIN — METOPROLOL SUCCINATE 50 MG: 50 TABLET, EXTENDED RELEASE ORAL at 09:56

## 2024-03-09 RX ADMIN — THIAMINE HCL TAB 100 MG 100 MG: 100 TAB at 09:57

## 2024-03-09 RX ADMIN — BRIMONIDINE TARTRATE 1 DROP: 2 SOLUTION OPHTHALMIC at 09:59

## 2024-03-09 RX ADMIN — CARBIDOPA AND LEVODOPA 1 TABLET: 25; 100 TABLET ORAL at 20:23

## 2024-03-09 RX ADMIN — Medication 4000 UNITS: at 09:55

## 2024-03-09 RX ADMIN — SODIUM BICARBONATE 650 MG: 650 TABLET ORAL at 20:38

## 2024-03-09 RX ADMIN — INSULIN LISPRO 2 UNITS: 100 INJECTION, SOLUTION INTRAVENOUS; SUBCUTANEOUS at 22:11

## 2024-03-09 RX ADMIN — BRIMONIDINE TARTRATE 1 DROP: 2 SOLUTION OPHTHALMIC at 20:24

## 2024-03-09 RX ADMIN — CEFTRIAXONE SODIUM 2 G: 2 INJECTION, SOLUTION INTRAVENOUS at 01:49

## 2024-03-09 RX ADMIN — PANTOPRAZOLE SODIUM 40 MG: 40 INJECTION, POWDER, FOR SOLUTION INTRAVENOUS at 20:23

## 2024-03-09 RX ADMIN — Medication 10 ML: at 02:03

## 2024-03-09 RX ADMIN — PANTOPRAZOLE SODIUM 40 MG: 40 INJECTION, POWDER, FOR SOLUTION INTRAVENOUS at 09:57

## 2024-03-09 RX ADMIN — GABAPENTIN 100 MG: 100 CAPSULE ORAL at 09:54

## 2024-03-09 RX ADMIN — GABAPENTIN 100 MG: 100 CAPSULE ORAL at 20:23

## 2024-03-09 RX ADMIN — Medication 10 ML: at 20:34

## 2024-03-09 RX ADMIN — Medication 10 ML: at 10:10

## 2024-03-09 ASSESSMENT — COGNITIVE AND FUNCTIONAL STATUS - GENERAL
PATIENT BASELINE BEDBOUND: NO
EATING MEALS: A LOT
MOBILITY SCORE: 12
CLIMB 3 TO 5 STEPS WITH RAILING: A LOT
PERSONAL GROOMING: A LOT
WALKING IN HOSPITAL ROOM: A LOT
DRESSING REGULAR UPPER BODY CLOTHING: A LOT
MOVING TO AND FROM BED TO CHAIR: A LOT
WALKING IN HOSPITAL ROOM: A LOT
DAILY ACTIVITIY SCORE: 12
DAILY ACTIVITIY SCORE: 12
TURNING FROM BACK TO SIDE WHILE IN FLAT BAD: A LOT
DRESSING REGULAR LOWER BODY CLOTHING: A LOT
CLIMB 3 TO 5 STEPS WITH RAILING: A LOT
HELP NEEDED FOR BATHING: A LOT
TOILETING: A LOT
STANDING UP FROM CHAIR USING ARMS: A LOT
STANDING UP FROM CHAIR USING ARMS: A LOT
MOVING FROM LYING ON BACK TO SITTING ON SIDE OF FLAT BED WITH BEDRAILS: A LOT
PERSONAL GROOMING: A LOT
HELP NEEDED FOR BATHING: A LOT
DRESSING REGULAR UPPER BODY CLOTHING: A LOT
MOVING TO AND FROM BED TO CHAIR: A LOT
MOBILITY SCORE: 12
EATING MEALS: A LOT
MOVING FROM LYING ON BACK TO SITTING ON SIDE OF FLAT BED WITH BEDRAILS: A LOT
TURNING FROM BACK TO SIDE WHILE IN FLAT BAD: A LOT
TOILETING: A LOT
DRESSING REGULAR LOWER BODY CLOTHING: A LOT

## 2024-03-09 ASSESSMENT — PAIN SCALES - GENERAL
PAINLEVEL_OUTOF10: 0 - NO PAIN
PAINLEVEL_OUTOF10: 0 - NO PAIN

## 2024-03-09 ASSESSMENT — ACTIVITIES OF DAILY LIVING (ADL): LACK_OF_TRANSPORTATION: NO

## 2024-03-09 NOTE — H&P
History Of Present Illness  Vamsi Benson is a 78 y.o. male presenting to [facility] on 3/8/2024 with pertinent medical history of A-fib on Coumadin, HTN, HLD, CAD & MI s/p CABG x5 (2016) and PTCA, IDDM II, Parkinson's, GERD, embolic CVA, late latent syphilis, and sepsis  r/t right hand cellulitis with extensor tenosynovitis c/b BSI mediated by GAS, MRSA s/p surgical debridement on 2/23/2024 at [facility] on IV Rocephin 2 g every 24 hours via central line in right chest-stop date 3/27/2024, RUE swelling s/p US negative DVT on 3/1/2024, 2/21/2024 blood cultures positive for group A strep bacteremia tx with Vancomycin initially, late latent syphilis treated with IM Bicillin weekly x 3 weeks (3/4/2024-second dose), GI bleed s/p EGD (3/4/2024) showing 3 nonbleeding ulcer and swelling of gastric outlet who presents to the ED from [facility] with complaints of H/H of 7.0 and 22.3, weakness, fatigue, melena stools for 2 to 3 days, and decreased appetite for 4 to 5 days.  Patient found to be on Lovenox 100 mg subcu every 24 hours and Coumadin 2.5 mg p.o. daily at the [facility].  Denies any recent fever/chills, headache, dizziness, chest pain / palpitations, shortness of breath, cough, abdominal pain, N/V, dysuria, or hematuria.    Of note, patient hospitalized at [facility] on 2/21/2024 for right hand swelling and pain.  Patient diagnosed with sepsis r/t right hand cellulitis with extensor tenosynovitis c/b BSI mediated by GAS, MRSA s/p surgical debridement on 2/23/2024 at [facility] on IV Rocephin 2 g every 24 hours-stop date 3/27/2024.  On 2/21/2024 blood cultures positive for group A strep bacteremia tx with Vancomycin initially.  Patient had history of untreated late latent syphilis, therefore ID started treatment with IM Bicillin weekly x 3 weeks (3/4/2024-second dose).  Then the patient started coughing up blood followed by diarrhea with melena and coffee-ground emesis.  RR was called and patient's hemoglobin dropped 3 points in  1 day.  Patient had EGD which showed 3 nonbleeding ulcer and swelling of the gastric outlet which was biopsied.  Patient transferred to SNF on Lovenox 100 mg every 24 hours and Coumadin 2.5 mg p.o. daily.  Patient had Conner maintained at discharge due to FRANCIE with minimal urine output and retention.    In the ED, vital signs with temp 37.4, HR 72, RR 20, /68, SpO2 95% on room air.  CBC with RBC 2.46, hemoglobin 7.2, hematocrit 23.3 (H/H--approximately 7/21 since 2/29/2024).  INR 1.3.  CMP with chloride 110, BUN 69, creatinine 2.34, GFR 28 (baseline creatinine 1 per nephrology's note on 2/29/2024).  CT abdomen and pelvis: Gastric wall thickening concerning for gastritis.  No acute active GI bleed noted.  Colonic diverticulosis noted.  Mild urinary bladder wall thickening.  Large bilateral pleural effusions and bibasilar atelectatic/consolidative opacities (2/29/2024: chest x-ray no pleural effusions or consolidation seen).  While in the ED patient received Protonix 40 mg IV and normal saline x 1 L.  Patient admitted inpatient with GI hemorrhage with melena with consult to GI.  ID, Ortho., and wound nurse consulted for management of right hand cellulitis with extensor tenosynovitis c/b BSI mediated by GAS, MRSA on IV Rocephin.    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), HTN (hypertension) (11/24/2009), Hyperlipidemia (09/13/2004), Hyponatremia, Late latent syphilis (02/26/2024), Malnutrition of moderate degree (CMS/HCC) (08/17/2016), MGUS  (monoclonal gammopathy of unknown significance) (02/27/2024), MI (myocardial infarction) (CMS/[facility]), Normocytic anemia (03/01/2024), Obesity, unspecified (08/20/2016), Parkinson's disease (02/22/2024), Polyneuropathy (01/16/2023), Pseudophakia of both eyes (03/08/2024), PVC (premature ventricular contraction) (11/24/2009), Retinal hemorrhage, Retinal ischemia, TIA (transient ischemic attack), Vertigo, and Vitamin D deficiency.    Surgical History  He has a past surgical history that includes Coronary artery bypass graft (2016); Cataract extraction; Coronary stent placement; Dental surgery; Abscess drainage; Descemets stripping automated endothelial keratoplasty (Right); Hand surgery (Right, 02/23/2024); and Esophagogastroduodenoscopy (03/04/2024).     Social History  He reports that he has never smoked. He has never used smokeless tobacco. He reports that he does not drink alcohol and does not use drugs.    Family History  Family History   Problem Relation Name Age of Onset    Heart disease Mother      Heart disease Father          Allergies  Codeine, Erythromycin, Meperidine, Metformin, Penicillins, and Oxycodone    Review of Systems (Bold words are positive)    Constitutional: NEGATIVE: Fever, Chills, Malaise, Weight Loss, Fatigue, decreased appetite     Eyes: NEGATIVE: Blurry Vision, Vision Loss/ Change, Redness, Drainage     ENMT: NEGATIVE: Nasal Discharge, Nasal Congestion, Throat Pain, Mouth Pain, Ear Pain     Respiratory: NEGATIVE: Dry Cough, Productive Cough, Shortness of Breath, Wheezing, Hemoptysis     Cardiac: NEGATIVE: Chest Pain, Dyspnea on Exertion, Palpitations, Orthopnea, Syncope      Gastrointestinal: Negative: Nausea, Vomiting, Diarrhea, Constipation, Abdominal Pain, melena stool     Genitourinary: NEGATIVE: Dysuria, Frequency, Hematuria, Flank Pain     Musculoskeletal: Negative: Decreased ROM, Pain, Weakness, Swelling     Neurological: NEGATIVE: Confusion, Dizziness, Headache, Syncope, Seizures  "    Psychiatric: NEGATIVE: Anxiety, Depression     Skin: NEGATIVE: Mass, Rash, Pruritus,  Ulcer, Pain     Endocrine: NEGATIVE: Sweat, Thirst, Polydipsia      Hematologic/Lymph: NEGATIVE: Anemia, Bruising, Night Sweats     Allergic/Immunologic: NEGATIVE: Itching, Sneezing        Physical Exam  Constitutional: Awake and alert, Calm, and Cooperative. Speech clear. No acute distress.  Skin: Pink, warm, and dry.  Right hand wound reddened, swollen, black tissue at the base of the thumb, scattered sutures from wrist to dorsal hand... See pictures in chart  Eyes: PERRL, sclera clear, No swelling or drainage noted  ENMT: Mucous membranes pink and dry, no apparent injury, no lesions seen  Head/Neck: Neck Supple, no apparent injury, trachea midline, no palpable masses on head  Cardiac: regular rhythm and rate, auscultated S1 and S2, no murmurs  Lungs: Diminished in bilateral lower lobes, symmetrical chest rise, no accessory muscle usage, tachypnea noted, 2 L nasal cannula  Abdomen: Soft, non-tender, no rebound tenderness or guarding, nondistended, positive bowel sounds in all quadrants  Genitourinary: Conner in place with clear yellow urine.  Musculoskeletal: ROM intact, no joint swelling, generalized weakness  Extremities: RUE edema noted, No cyanosis, 1+ pulses of the extremities, see skin assessment for wounds  Neurological: Alert and Oriented x 2-3, intact senses, unable to grasp with right hand, left hand grasp weak and bilateral foot push/pulls equal.  Psychological: Appropriate mood and behavior.       Last Recorded Vitals  Blood pressure 145/89, pulse 83, temperature 36.4 °C (97.5 °F), temperature source Temporal, resp. rate 20, height 1.88 m (6' 2\"), weight 91.4 kg (201 lb 8 oz), SpO2 91 %.  Visit Vitals  /89 (BP Location: Left arm, Patient Position: Lying)   Pulse 83   Temp 36.4 °C (97.5 °F) (Temporal)   Resp 20   Ht 1.88 m (6' 2\")   Wt 91.4 kg (201 lb 8 oz)   SpO2 91%   BMI 25.87 kg/m²   Smoking Status Never "   BSA 2.18 m²     Weight: 90.7 kg (200 lb)   Pain Assessment: 0-10  Pain Score: 0 - No pain        Relevant Results  Results for orders placed or performed during the hospital encounter of 03/08/24 (from the past 24 hour(s))   CBC and Auto Differential   Result Value Ref Range    WBC 6.1 4.4 - 11.3 x10*3/uL    nRBC 0.0 0.0 - 0.0 /100 WBCs    RBC 2.46 (L) 4.50 - 5.90 x10*6/uL    Hemoglobin 7.2 (L) 13.5 - 17.5 g/dL    Hematocrit 23.3 (L) 41.0 - 52.0 %    MCV 95 80 - 100 fL    MCH 29.3 26.0 - 34.0 pg    MCHC 30.9 (L) 32.0 - 36.0 g/dL    RDW 15.6 (H) 11.5 - 14.5 %    Platelets 219 150 - 450 x10*3/uL    Neutrophils % 68.5 40.0 - 80.0 %    Immature Granulocytes %, Automated 0.3 0.0 - 0.9 %    Lymphocytes % 22.2 13.0 - 44.0 %    Monocytes % 6.4 2.0 - 10.0 %    Eosinophils % 2.1 0.0 - 6.0 %    Basophils % 0.5 0.0 - 2.0 %    Neutrophils Absolute 4.19 1.60 - 5.50 x10*3/uL    Immature Granulocytes Absolute, Automated 0.02 0.00 - 0.50 x10*3/uL    Lymphocytes Absolute 1.36 0.80 - 3.00 x10*3/uL    Monocytes Absolute 0.39 0.05 - 0.80 x10*3/uL    Eosinophils Absolute 0.13 0.00 - 0.40 x10*3/uL    Basophils Absolute 0.03 0.00 - 0.10 x10*3/uL   Comprehensive metabolic panel   Result Value Ref Range    Glucose 84 74 - 99 mg/dL    Sodium 138 136 - 145 mmol/L    Potassium 4.7 3.5 - 5.3 mmol/L    Chloride 110 (H) 98 - 107 mmol/L    Bicarbonate 21 21 - 32 mmol/L    Anion Gap 12 10 - 20 mmol/L    Urea Nitrogen 69 (H) 6 - 23 mg/dL    Creatinine 2.34 (H) 0.50 - 1.30 mg/dL    eGFR 28 (L) >60 mL/min/1.73m*2    Calcium 8.3 (L) 8.6 - 10.3 mg/dL    Albumin 2.1 (L) 3.4 - 5.0 g/dL    Alkaline Phosphatase 52 33 - 136 U/L    Total Protein 6.1 (L) 6.4 - 8.2 g/dL    AST 8 (L) 9 - 39 U/L    Bilirubin, Total 0.3 0.0 - 1.2 mg/dL    ALT 3 (L) 10 - 52 U/L   Coagulation Screen   Result Value Ref Range    Protime 15.0 (H) 9.8 - 12.8 seconds    INR 1.3 (H) 0.9 - 1.1    aPTT 42 (H) 27 - 38 seconds   Type and Screen   Result Value Ref Range    ABO TYPE O     Rh  TYPE POS     ANTIBODY SCREEN NEG       CT angio abdomen pelvis w and or wo IV IV contrast    Result Date: 3/8/2024  Interpreted By:  John Rodriguez, STUDY: CT ANGIO ABDOMEN PELVIS W AND/OR WO IV IV CONTRAST;  3/8/2024 7:49 pm   INDICATION: Signs/Symptoms:weakness, melena, on coumadin.   COMPARISON: None.   ACCESSION NUMBER(S): [id number]   ORDERING CLINICIAN: ARYA IVDAL   TECHNIQUE: Contiguous axial images of the abdomen and pelvis were obtained with and without intravenous contrast. And sagittal reformatted images were obtained from the axial images. MIPS were also performed and reviewed and from the findings of the examination.   FINDINGS: There is limited evaluation of the lung bases. Partially imaged large bilateral pleural effusions and bibasilar atelectatic/consolidative opacities. Coronary artery calcifications. 6 mm subcarinal lymph node.   No evidence of liver mass. There is cholelithiasis.   Atrophy of the pancreas.   No splenomegaly.   The adrenal glands appear unremarkable.   Symmetric enhancement of the kidneys. No hydronephrosis.   Atherosclerotic calcification of the aorta and abdominal and pelvic vasculature. The celiac artery, superior mesenteric artery, bilateral renal arteries, and the inferior mesenteric artery are patent. There is no evidence of extravasation contrast in bowel lumen to suggest acute active gastrointestinal bleed.   The stomach is underdistended and not well evaluated. There is however evidence of gastric wall thickening. No evidence of bowel obstruction or acute appendicitis. Colonic diverticulosis without evidence of acute diverticulitis.   A Conner catheter is present and the urinary bladder is underdistended and not well evaluated. Air in the urinary bladder compatible with the catheterization.   Multilevel degenerative change of the lumbar spine.       Evaluation is limited secondary to underdistention, there is however evidence of gastric wall thickening concerning for  gastritis, follow-up upper endoscopy is recommended for further evaluation and to exclude other underlying pathology.   No evidence of separation contrast bowel lumen to suggest acute active gastrointestinal bleed.   Colonic diverticulosis without evidence of acute diverticulitis.   Underdistention versus mild urinary bladder wall thickening; please correlate urinalysis to exclude cystitis.   Partially imaged large bilateral pleural effusions and bibasilar atelectatic/consolidative opacities.   MACRO: None   Signed by: John Rodriguez 3/8/2024 8:11 PM Dictation workstation:   [id number]     EKG: Sinus rhythm with sinus arrhythmia, ventricular rate 77, , QRS 90, QTc 434. No ST elevation or depression noted.     Home Medications  Prior to Admission medications    Medication Sig Start Date End Date Taking? Authorizing Provider   acetaminophen (Tylenol) 325 mg tablet Take 2 tablets (650 mg) by mouth every 4 hours if needed for mild pain (1 - 3) or fever (temp greater than 38.0 C).    Historical Provider, MD   alum-mag hydroxide-simeth (Mylanta) 200-200-20 mg/5 mL oral suspension Take 15 mL by mouth every 6 hours if needed for indigestion or heartburn.    Historical Provider, MD   atorvastatin (Lipitor) 80 mg tablet Take 1 tablet (80 mg) by mouth once daily at bedtime.    Historical Provider, MD   bisacodyl (Dulcolax) 10 mg suppository Insert 1 suppository (10 mg) into the rectum once daily as needed for constipation.    Historical Provider, MD   brimonidine (AlphaGAN) 0.2 % ophthalmic solution Administer 1 drop into the right eye 2 times a day.    Historical Provider, MD   carbidopa-levodopa (Sinemet)  mg tablet Take 1 tablet by mouth 2 times a day.    Historical Provider, MD   cefTRIAXone 2 gram recon soln Infuse 2 g into a venous catheter once every 24 hours. 3/7/24 3/27/24  Historical Provider, MD   cholecalciferol (Vitamin D-3) 25 MCG (1000 UT) tablet Take 4 tablets (4,000 Units) by mouth once daily.     Historical Provider, MD   empagliflozin (Jardiance) 10 mg Take 1 tablet (10 mg) by mouth once daily.    Srikanth Provider, MD   enoxaparin (Lovenox) 100 mg/mL syringe Inject 0.95 mg under the skin once daily.    Srikanth Provider, MD   gabapentin (Neurontin) 100 mg capsule Take 1 capsule (100 mg) by mouth 2 times a day.    Srikanth Pierre MD   glipiZIDE (Glucotrol) 5 mg tablet Take 0.5 tablets (2.5 mg) by mouth 2 times a day before meals.    Historical Provider, MD   insulin lispro (HumaLOG) 100 unit/mL injection Inject 0.04 mL (4 Units) under the skin 4 times a day before meals. Take as directed per insulin instructions.    Srikanth Provider, MD   magnesium hydroxide (Milk of Magnesia) 400 mg/5 mL suspension Take 30 mL by mouth once daily as needed for constipation.    Historical Provider, MD   metoprolol succinate XL (Toprol-XL) 50 mg 24 hr tablet Take 1 tablet (50 mg) by mouth once daily. Hold for SBP<100 or HR <50    Historical Provider, MD   nitroglycerin (Nitrostat) 0.4 mg SL tablet Place 1 tablet (0.4 mg) under the tongue every 5 minutes if needed for chest pain.    Historical Provider, MD   sodium chloride 0.9% (Normal Saline Flush) flush Infuse 10 mL into a venous catheter 2 times a day.    Historical Provider, MD   thiamine 100 mg tablet Take 1 tablet (100 mg) by mouth once daily.    Historical Provider, MD   warfarin (Coumadin) 2.5 mg tablet Take 1 tablet (2.5 mg) by mouth once daily at bedtime. Take as directed per After Visit Summary.    Historical Provider, MD       Medications  Scheduled medications  atorvastatin, 80 mg, oral, Nightly  brimonidine, 1 drop, Right Eye, BID  carbidopa-levodopa, 1 tablet, oral, BID  cefTRIAXone, 2 g, intravenous, q24h  cholecalciferol, 4,000 Units, oral, Daily  gabapentin, 100 mg, oral, BID  [Held by provider] glipiZIDE, 2.5 mg, oral, BID AC  insulin lispro, 0-10 Units, subcutaneous, q4h  [Held by provider] insulin lispro, 4 Units, subcutaneous, Before meals &  nightly  metoprolol succinate XL, 50 mg, oral, Daily  pantoprazole, 40 mg, intravenous, BID  sodium chloride 0.9%, 10 mL, intravenous, BID  thiamine, 100 mg, oral, Daily      Continuous medications  sodium chloride 0.9%, 100 mL/hr      PRN medications  PRN medications: acetaminophen, dextrose, glucagon, ipratropium-albuteroL, melatonin, oxygen, polyethylene glycol        Assessment/Plan   Principal Problem:    Gastrointestinal hemorrhage with melena  Active Problems:    Late latent syphilis    Hyperlipidemia    HTN (hypertension)    Extensor tenosynovitis of right wrist    CAD (coronary artery disease)    Cellulitis    Atrial fibrillation (CMS/[facility])    Streptococcosis    Insulin dependent type 2 diabetes mellitus (CMS/[facility])    FRANCIE (acute kidney injury) (CMS/[facility])    Pleural effusion, bilateral    Swelling of extremity, right        Plan:  Code Status: Full Code   Consult: GI, ID, orthopedics, pulmonary, wound nurse.  ATB: Rocephin 1 g IV every 24 hours.   IVFs: Normal saline at 100 MLS per hour x 10 hours.  Meds: Lispro SS per Accu-Chek every 4 hours, hypoglycemic protocol, Protonix 40 mg IV 2 times daily, melatonin 3 milligrams p.o. daily as needed for insomnia, MiraLAX 17 gms p.o daily as needed for constipation.  Home meds resumed as appropriate... Jardiance, glipizide, and lispro held due to n.p.o. and FRANCIE  Avoid nephrotoxic medications.  Monitor for hypo/hyperglycemia signs and symptoms.  Diet: N.p.o.  Except meds  Telemetry monitoring.  Vital signs with BP, HR, & RR Q 4 hours.  Pulse ox Q 4 hours.   Admission height and weight.  Daily weight.  Maintain Conner catheter due to chronic urinary retention.   Oxygen via nasal cannula at 1-4 L/min. Titrate for SPO2 92%.  Respiratory evaluation and treatment per protocol.  Labs ordered: CBC, BMP, magnesium, phosphorus, UA with reflex to culture, procalcitonin, CRP, blood cultures x 2.  Test ordered: Ultrasound right upper extremity.  Monitor / trend labs while  inpatient.  Replace electrolytes as needed.  Transfuse with PRBC for hemoglobin<7.0.  Strict I&Os.  Aspiration precautions.  Out of bed with assistance   Fall precautions  PT/OT eval and treat as indicated.  Call physician for temperature < 36.5 or >38.0 degrees celsius.  Call physician for pulse <50 or >120.  Call physician for respiratory rate <10 or >22.  Call physician for systolic blood pressure <90 or >170.  Call physician for diastolic blood pressure < 50 or >100.  Call physician for pulse ox <92%.  See orders for further plan of care ordered.     VTE prophylaxis:   Anticoagulation on hold due to GI bleed  BLE SCDs.  Monitor for s/s of bleeding    Further evaluation and management per attending and consulting physicians.      This note has been transcribed using Dragon voice recognition system and there is a possibility of unintentional typing misprints.  Any information found to be copied from previous providers is done in the best interest of the patient to provide accurate, quality, and continuity of care.     I spent 75 minutes in the professional and overall care of this patient.      Candy Curran, [facility]  Med. [place]

## 2024-03-09 NOTE — CONSULTS
INPATIENT NEPHROLOGY CONSULT NOTE        CONSULT: Nephrology SERVICE    PATIENT NAME: Vamsi Benson    MRN: [id number]  DATE of SERVICE: March 9, 2024  TIME of SERVICE: 9:58 AM      REASON FOR CONSULT:  FRANCIE  REQUESTING PHYSICIAN: Dr. Osman  PRIMARY CARE PHYSICIAN: Rufino Osman MD    HPI:  Mr. Benson is a 78-year-old male who presented to [facility] March 8, 2024 for evaluation of GI bleed complicated with acute kidney injury.    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  Recent evaluation of right upper extremity swelling was negative for DVT.  History of GI bleed status post EGD March 4, 2024 which demonstrated 3 nonbleeding ulcers in swelling of gastric outlet.  Patient presented to [facility] from [facility] for evaluation of acute blood loss anemia with drop in hemoglobin to 7 mg/dL associated with melena for 2 to 3 days prior to admission in addition to poor oral intake for 4 days.  Patient was found to be on Lovenox 100 mg subcu daily and Coumadin 2.5 mg p.o. daily at the facility.  Labs demonstrated no acute kidney injury with a creatinine up to 2.3 mg deciliter from baseline creatinine of 1.2 mg deciliter, BUN up to 65, metabolic acidosis bicarb of 19, hyperchloremia with a chloride of 112, hyperkalemia with potassium of 5, anemia with hemoglobin 7 mg deciliter.  Urinalysis, Conner sample.  More than 50 WBCs  more than 20 RBCs and budding yeast present.  Blood cultures so far no growth.  Urine culture in process.  Vitals demonstrated blood pressure 130/70 tachypnea with respiratory rate of 22 heart rate of 68.    Patient seen and evaluated at bedside resting in bed comfortably, right breast with dressing in place and swelling noted.  Conner catheter in place.  Abdomen soft.  Patient with dementia and unable to provide accurate history.  Believes he presented to the hospital from home.    ASSESSMENT AND PLAN:  Acute kidney injury superimposed on chronic kidney disease stage II:  Acute kidney injury likely hemodynamically mediated which likely evolved into ATN the setting of acute blood loss anemia, acute GI bleed, recent to group a strep bacteremia requiring IV ceftriaxone.  -- Serum creatinine 2.3 mg deciliter with a EGFR of 28 mill per minute per 1.73 m2  -- Chronic urinary retention: Conner catheter in place with dark concentrated urine.     Volume: Intravascularly volume depleted due to poor oral intake, acute GI bleed  Tendency to third space with bilateral pleural effusion.     Hyperkalemia: Due to FRANCIE, cellular shift in the setting of acidosis  Lokelma for potassium more than 4.5    Metabolic acidosis: Bicarb of 19  Oral sodium bicarbonates 650 mg p.o. 3 times daily    Acute blood loss anemia: Hemoglobin 7 mg/dL  --Acute GI bleed, to check iron stores   -- Blood transfusion for hemoglobin less than 7  --To add Epogen if persist    Concern for urinary tract infection:  Chronic indwelling Conner catheter in place.  Urine culture in process.     Recent right hand abscess status post I&D February 23.  Receiving IV ceftriaxone infectious disease following, appreciate recommendations.   Recent group A streptococcus bacteremia.  Latent syphilis    Atrial fibrillation managed with anticoagulation and metoprolol    Associated condition: Parkinsonism, vascular dementia, diabetes    Plan:  Acute kidney injury in the setting of  acute blood loss anemia, recent bacteremia, prolonged antibiotic use, suspected urinary tract infection:   --FRANCIE workup to check urine electrolytes, CK, uric acid.  To quantify protein to creatinine ratio.   -- Status post initial resuscitation with improvement in hemodynamics.  -- To hold further IV fluid after this liter, tendency to fluid retention with mild bilateral pleural effusions, oral intake improved today.   -- Oral sodium bicarbonates 650 mg p.o. 3 times daily.   -- Hyperphosphatemia no indication for phosphate binders in acute setting.   -- Hyperkalemia most likely will improve improving metabolic acidosis however if persist will add Lokelma maintain potassium level less than 5.5.  -- No concern for acute interstitial nephritis based on urinalysis microscopy.  To continue IV ceftriaxone and adjust the dose based on EGFR.   -- No uremic symptoms patient with dementia due to underlying Parkinson's manage prior history of CVA.  No asterixis no pericardial rub.  --Indwelling Conner catheter for chronic urinary retention  -- Medication reviewed, renally dosed to continue to adjust medication based on GFR.     Will follow, thank you!    I sincerely, thank you Dr. Osman for this opportunity to participate in the care of your patient, I will follow from Nephrology point view!    PAST MEDICAL HISTORY:    Past Medical History:   Diagnosis Date    Atrial fibrillation (CMS/[facility]) 08/20/2016    Bell's palsy 12/04/2014    CAD (coronary artery disease) 11/24/2009    Formatting of this note might be different from the original. History: ALISIA to RCA in 2007. 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    HTN (hypertension) 11/24/2009    Hyperlipidemia 09/13/2004    Hyponatremia     Late latent syphilis 02/26/2024    Malnutrition of moderate degree (CMS/[facility]) 08/17/2016    MGUS (monoclonal gammopathy of unknown significance) 02/27/2024    MI (myocardial infarction) (CMS/[facility])     Normocytic anemia 03/01/2024    Obesity, unspecified 08/20/2016    Parkinson's disease 02/22/2024    Polyneuropathy 01/16/2023    Pseudophakia of both eyes 03/08/2024    PVC (premature ventricular contraction) 11/24/2009    Retinal hemorrhage     Retinal ischemia     TIA (transient ischemic attack)     Vertigo     Vitamin D deficiency        PAST SURGICAL HISTORY:    Past Surgical History:   Procedure Laterality Date    ABSCESS DRAINAGE      Renal    CATARACT EXTRACTION      CORONARY ARTERY BYPASS GRAFT  2016    x5 vessels    CORONARY STENT PLACEMENT      x2    DENTAL SURGERY      DESCEMETS STRIPPING AUTOMATED ENDOTHELIAL KERATOPLASTY Right     ESOPHAGOGASTRODUODENOSCOPY  03/04/2024    3 nonbleeding ulcers and swelling of the gastric outlet    HAND SURGERY Right 02/23/2024    Right hand incision and drainage down to and including tenosynovium, excisional       FAMILY HISTORY:    Family History   Problem Relation Name Age of Onset    Heart disease Mother      Heart disease Father         SOCIAL HISTORY:    Social History     Tobacco Use    Smoking status: Never    Smokeless tobacco: Never   Substance Use Topics    Alcohol use: Never    Drug use: Never       MEDICATIONS:  Prior to Admission Medications:  Medications Prior to Admission   Medication Sig Dispense Refill Last Dose    acetaminophen (Tylenol) 325 mg tablet Take 2 tablets (650 mg) by mouth every 4 hours if needed for mild pain (1 - 3) or fever (temp greater than 38.0 C).   Past Month    alum-mag hydroxide-simeth (Mylanta) 200-200-20 mg/5 mL oral suspension Take 15 mL by mouth every 6 hours if  needed for indigestion or heartburn.   Past Month    atorvastatin (Lipitor) 80 mg tablet Take 1 tablet (80 mg) by mouth once daily at bedtime.   3/7/2024    bisacodyl (Dulcolax) 10 mg suppository Insert 1 suppository (10 mg) into the rectum once daily as needed for constipation.   Past Month    brimonidine (AlphaGAN) 0.2 % ophthalmic solution Administer 1 drop into the right eye 2 times a day.   3/8/2024 at am    carbidopa-levodopa (Sinemet)  mg tablet Take 1 tablet by mouth 2 times a day.   3/8/2024 at am    cefTRIAXone 2 gram recon soln Infuse 2 g into a venous catheter once every 24 hours.       cholecalciferol (Vitamin D-3) 25 MCG (1000 UT) tablet Take 4 tablets (4,000 Units) by mouth once daily.   3/8/2024 at am    empagliflozin (Jardiance) 10 mg Take 1 tablet (10 mg) by mouth once daily.   3/8/2024 at am    enoxaparin (Lovenox) 100 mg/mL syringe Inject 0.95 mg under the skin once daily.       gabapentin (Neurontin) 100 mg capsule Take 1 capsule (100 mg) by mouth 2 times a day.   3/8/2024 at am    glipiZIDE (Glucotrol) 5 mg tablet Take 0.5 tablets (2.5 mg) by mouth 2 times a day before meals.   3/8/2024 at am    insulin lispro (HumaLOG) 100 unit/mL injection Inject 0.04 mL (4 Units) under the skin 4 times a day before meals. Take as directed per insulin instructions.   3/8/2024 at am    magnesium hydroxide (Milk of Magnesia) 400 mg/5 mL suspension Take 30 mL by mouth once daily as needed for constipation.   Past Month    metoprolol succinate XL (Toprol-XL) 50 mg 24 hr tablet Take 1 tablet (50 mg) by mouth once daily. Hold for SBP<100 or HR <50   3/8/2024 at am    nitroglycerin (Nitrostat) 0.4 mg SL tablet Place 1 tablet (0.4 mg) under the tongue every 5 minutes if needed for chest pain.   Past Month    sodium chloride 0.9% (Normal Saline Flush) flush Infuse 10 mL into a venous catheter 2 times a day.   3/8/2024 at am    thiamine 100 mg tablet Take 1 tablet (100 mg) by mouth once daily.   3/8/2024 at am     warfarin (Coumadin) 2.5 mg tablet Take 1 tablet (2.5 mg) by mouth once daily at bedtime. Take as directed per After Visit Summary.   3/7/2024       INPATIENT MEDICATIONS:    Current Facility-Administered Medications:     acetaminophen (Tylenol) tablet 650 mg, 650 mg, oral, q4h PRN, BRENDA Navarro    atorvastatin (Lipitor) tablet 80 mg, 80 mg, oral, Nightly, BRENDA Navarro    brimonidine (AlphaGAN) 0.2 % ophthalmic solution 1 drop, 1 drop, Right Eye, BID, BRENDA Navarro    carbidopa-levodopa (Sinemet)  mg per tablet 1 tablet, 1 tablet, oral, BID, BRENDA Navarro    cefTRIAXone (Rocephin) 2 g IV in dextrose 5% 50 mL, 2 g, intravenous, q24h, BRENDA Navarro, Stopped at 03/09/24 0219    cholecalciferol (Vitamin D-3) tablet 4,000 Units, 4,000 Units, oral, Daily, BRENDA Navarro    dextrose 50 % injection 25 g, 25 g, intravenous, q15 min PRN, BRENDA Navarro    gabapentin (Neurontin) capsule 100 mg, 100 mg, oral, BID, BRENDA Navarro    [Held by provider] glipiZIDE (Glucotrol) tablet 2.5 mg, 2.5 mg, oral, BID AC, BRENDA Navarro    glucagon (Glucagen) injection 1 mg, 1 mg, intramuscular, q15 min PRN, BRENDA Navarro    insulin lispro (HumaLOG) injection 0-10 Units, 0-10 Units, subcutaneous, q4h, BRENDA Navarro    [Held by provider] insulin lispro (HumaLOG) injection 4 Units, 4 Units, subcutaneous, Before meals & nightly, BRENDA Navarro    ipratropium-albuteroL (Duo-Neb) 0.5-2.5 mg/3 mL nebulizer solution 3 mL, 3 mL, nebulization, q2h PRN, BRENDA Navarro    melatonin tablet 3 mg, 3 mg, oral, Daily PRN, BRENDA Navarro    metoprolol succinate XL (Toprol-XL) 24 hr tablet 50 mg, 50 mg, oral, Daily, BRENDA Navarro    oxygen (O2) therapy, , inhalation, Continuous PRN - O2/gases, BRENDA Navarro    pantoprazole (ProtoNix) injection 40 mg, 40 mg, intravenous, BID, Rosalina RAMIREZ  "BRENDA Cheung    polyethylene glycol (Glycolax, Miralax) packet 17 g, 17 g, oral, Daily PRN, BRENDA Navarro    sodium chloride 0.9% flush 10 mL, 10 mL, intravenous, BID, Candy MICHAEL BRENDA Curran, 10 mL at 03/09/24 0203    sodium chloride 0.9% infusion, 100 mL/hr, intravenous, Continuous, Candy MICHALE BRENDA Curran, Last Rate: 100 mL/hr at 03/09/24 0205, 100 mL/hr at 03/09/24 0205    thiamine (Vitamin B-1) tablet 100 mg, 100 mg, oral, Daily, Candy MICHAEL BRENDA Curran    ALLERGIES:  Allergies   Allergen Reactions    Codeine Other     Head paralyzed    Erythromycin GI Upset    Meperidine GI Upset    Metformin Diarrhea and GI Upset    Penicillins GI Upset    Oxycodone Hives       COMPLETE REVIEW OF SYSTEMS:    A comprehensive 14 point review of systems was obtained.  Constitutional: Awake and alert however oriented to self  HENT: Negative for congestion and sore throat.    Eyes: Negative for pain and visual disturbance.  Respiratory: Negative for cough and shortness of breath.    Cardiovascular: Negative for chest pain and palpitations.  Gastrointestinal: Negative for abdominal pain, diarrhea and vomiting.  Genitourinary: Chronic indwelling Conner catheter in place  Musculoskeletal: Negative for back pain and myalgias.  Skin:  right chest Mediport in place, right hand in dressing  Neurological: Negative for weakness and headaches.  Hematological: Negative for adenopathy. Does not bruise/bleed easily.  Psychiatric/Behavioral: Negative for agitation and confusion.  All other reviewed and negative other than HPI.    PHYSICAL EXAM: Physical exam performed.  Patient Vitals for the past 24 hrs:   BP Temp Temp src Pulse Resp SpO2 Height Weight   03/09/24 0800 145/82 36.3 °C (97.3 °F) -- 74 18 97 % -- --   03/09/24 0400 143/71 36.4 °C (97.5 °F) Temporal 75 18 98 % -- --   03/09/24 0000 150/73 36.3 °C (97.3 °F) Temporal 68 18 98 % -- --   03/08/24 2200 145/89 36.4 °C (97.5 °F) Temporal 83 20 91 % 1.88 m (6' 2\") 91.4 kg " (201 lb 8 oz)   03/08/24 2102 -- -- -- 84 (!) 22 97 % -- --   03/08/24 2100 149/72 -- -- 78 (!) 24 -- -- --   03/08/24 2030 154/74 -- -- 85 (!) 22 (!) 92 % -- --   03/08/24 2010 135/70 -- -- 83 (!) 22 (!) 93 % -- --   03/08/24 1900 146/71 -- -- 69 17 96 % -- --   03/08/24 1830 155/69 -- -- 73 18 97 % -- --   03/08/24 1800 149/70 -- -- 69 15 97 % -- --   03/08/24 1730 148/85 -- -- 66 14 96 % -- --   03/08/24 1700 151/77 -- -- 78 20 95 % -- --   03/08/24 1636 157/68 37.4 °C (99.3 °F) Temporal 72 20 95 % 1.829 m (6') 90.7 kg (200 lb)     Body mass index is 25.87 kg/m².    CONSTITUTIONAL:  Vital signs reviewed.  Intermittent episodes of tachypnea, hypoxia improved  GENERAL: Chronically ill looking male with dementia, oriented to self  SKIN: Right hand with dressing in place with peripheral edema and erythema.   Right Mediport central line in place  HEAD/SINUSES: Atraumatic  EYES: PERRLA, + pallor  OROPHARYNX: Dry mucous membranes  NECK: no  jugulovenous distention, No carotid bruits, Carotid pulse normal contour, Supple  LUNGS: Diminished air entry bilaterally, positive rhonchi  CARDIAC: distant S1 and S2; no rubs, murmurs, or gallops  ABDOMEN: Abdomen soft, non-tender, BS normal, distended  EXTREMITIES: Good capillary refill, no edema.  Right upper arm swelling and erythema  NEUROLOGIC: Awake, alert and oriented ×1, No focal deficit  HEMATOLOGIC/Lymphatic/Immunologic: No abnormal bleeding, echymosis, inflammation. No cervical or supraclavicular lymphadenopathy.  : Conner catheter in place    Intake/Output last 3 shifts:  I/O last 3 completed shifts:  In: 100 (1.1 mL/kg) [P.O.:100]  Out: 350 (3.8 mL/kg) [Urine:350 (0.1 mL/kg/hr)]  Weight: 91.4 kg     Diagnostic tests reviewed for today's visit:    CBC, Coags, RNP, Mg, Phos   Results from last 7 days   Lab Units 03/09/24  0555   WBC AUTO x10*3/uL 6.5   RBC AUTO x10*6/uL 2.40*   HEMOGLOBIN g/dL 7.0*   HEMATOCRIT % 22.8*     Results from last 7 days   Lab Units  "03/09/24  0555 03/08/24  1647   SODIUM mmol/L 140 138   POTASSIUM mmol/L 5.0 4.7   CHLORIDE mmol/L 112* 110*   CO2 mmol/L 19* 21   BUN mg/dL 65* 69*   CREATININE mg/dL 2.32* 2.34*   CALCIUM mg/dL 8.0* 8.3*   PHOSPHORUS mg/dL 5.5*  --    MAGNESIUM mg/dL 2.10  --    BILIRUBIN TOTAL mg/dL  --  0.3   ALT U/L  --  3*   AST U/L  --  8*     Results from last 7 days   Lab Units 03/09/24  0421   COLOR U  Yellow   APPEARANCE U  Hazy*   PH U  5.0   SPEC GRAV UR  1.029   PROTEIN U mg/dL 100 (2+)*   BLOOD UR  LARGE (3+)*   NITRITE U  NEGATIVE   WBC UR /HPF >50*     IMAGING: CXR reviewed in  images.      SIGNATURE: Dylon Marin MD  Nephrology and Hypertension  [address] [facility]  Office phone: [phone]  FAX: [phone]      This note was partially created using voice recognition software and is inherently subject to errors including those of syntax and \"sound-alike\" substitutions which may escape proofreading.  In such instances, original meaning may be extrapolated by contextual derivation.    "

## 2024-03-09 NOTE — CARE PLAN
The patient's goals for the shift include      The clinical goals for the shift include see care plan      Problem: Pain  Goal: My pain/discomfort is manageable  Outcome: Progressing     Problem: Safety  Goal: Patient will be injury free during hospitalization  Outcome: Progressing  Goal: I will remain free of falls  Outcome: Progressing     Problem: Daily Care  Goal: Daily care needs are met  Outcome: Progressing     Problem: Psychosocial Needs  Goal: Demonstrates ability to cope with hospitalization/illness  Outcome: Progressing  Goal: Collaborate with me, my family, and caregiver to identify my specific goals  Outcome: Progressing  Flowsheets (Taken 3/8/2024 2230)  Cultural Requests During Hospitalization: none  Spiritual Requests During Hospitalization: none     Problem: Discharge Barriers  Goal: My discharge needs are met  Outcome: Progressing

## 2024-03-09 NOTE — PROGRESS NOTES
"Orthopedics consult        Vamsi Benson is a 78 y.o. male on day 1 of admission presenting with Gastrointestinal hemorrhage with melena.  Patient apparently had surgery to [facility] for hand infection.  I do not have the notes stenosis treated him.  But he was treated by hand surgery there for which she was I indeed.  The patient was in dressing changes.  The patient has unrelated admission.  I been asked to see the patient as I am on-call for orthopedics.  This incidentally does not cover hand.    Subjective      Patient denies any complaints of hand pain.  He is in a dressing.  I have unwrapped the dressing and evaluated the patient.    Objective     Physical Exam    Physical examination demonstrates full eschar on the right side of the thumb base toward the hand.  That area measures 4 x 4 about 6 cm.  There is a Xeroform dressing over the area of the patient has a previous I&D site which is likely will for the most part by secondary intention but there is some sutures proximally and distally.  There is no cellulitis there is no new reaccumulation of an abscess that I can appreciate if there is still streaking or redness.  The skin is a little macerated by the wetness of the Xeroform dressing.    Last Recorded Vitals  Blood pressure 145/82, pulse 74, temperature 36.3 °C (97.3 °F), resp. rate 18, height 1.88 m (6' 2\"), weight 91.4 kg (201 lb 8 oz), SpO2 97 %.  Intake/Output last 3 Shifts:  I/O last 3 completed shifts:  In: 100 (1.1 mL/kg) [P.O.:100]  Out: 350 (3.8 mL/kg) [Urine:350 (0.1 mL/kg/hr)]  Weight: 91.4 kg     Relevant Results      Scheduled medications  atorvastatin, 80 mg, oral, Nightly  brimonidine, 1 drop, Right Eye, BID  carbidopa-levodopa, 1 tablet, oral, BID  cefTRIAXone, 2 g, intravenous, q24h  cholecalciferol, 4,000 Units, oral, Daily  gabapentin, 100 mg, oral, BID  [Held by provider] glipiZIDE, 2.5 mg, oral, BID AC  insulin lispro, 0-10 Units, subcutaneous, q4h  [Held by provider] " insulin lispro, 4 Units, subcutaneous, Before meals & nightly  metoprolol succinate XL, 50 mg, oral, Daily  pantoprazole, 40 mg, intravenous, BID  sodium chloride 0.9%, 10 mL, intravenous, BID  thiamine, 100 mg, oral, Daily      Continuous medications  sodium chloride 0.9%, 100 mL/hr, Last Rate: 100 mL/hr (03/09/24 0205)      PRN medications  PRN medications: acetaminophen, dextrose, glucagon, ipratropium-albuteroL, melatonin, oxygen, polyethylene glycol  Results for orders placed or performed during the hospital encounter of 03/08/24 (from the past 24 hour(s))   CBC and Auto Differential   Result Value Ref Range    WBC 6.1 4.4 - 11.3 x10*3/uL    nRBC 0.0 0.0 - 0.0 /100 WBCs    RBC 2.46 (L) 4.50 - 5.90 x10*6/uL    Hemoglobin 7.2 (L) 13.5 - 17.5 g/dL    Hematocrit 23.3 (L) 41.0 - 52.0 %    MCV 95 80 - 100 fL    MCH 29.3 26.0 - 34.0 pg    MCHC 30.9 (L) 32.0 - 36.0 g/dL    RDW 15.6 (H) 11.5 - 14.5 %    Platelets 219 150 - 450 x10*3/uL    Neutrophils % 68.5 40.0 - 80.0 %    Immature Granulocytes %, Automated 0.3 0.0 - 0.9 %    Lymphocytes % 22.2 13.0 - 44.0 %    Monocytes % 6.4 2.0 - 10.0 %    Eosinophils % 2.1 0.0 - 6.0 %    Basophils % 0.5 0.0 - 2.0 %    Neutrophils Absolute 4.19 1.60 - 5.50 x10*3/uL    Immature Granulocytes Absolute, Automated 0.02 0.00 - 0.50 x10*3/uL    Lymphocytes Absolute 1.36 0.80 - 3.00 x10*3/uL    Monocytes Absolute 0.39 0.05 - 0.80 x10*3/uL    Eosinophils Absolute 0.13 0.00 - 0.40 x10*3/uL    Basophils Absolute 0.03 0.00 - 0.10 x10*3/uL   Comprehensive metabolic panel   Result Value Ref Range    Glucose 84 74 - 99 mg/dL    Sodium 138 136 - 145 mmol/L    Potassium 4.7 3.5 - 5.3 mmol/L    Chloride 110 (H) 98 - 107 mmol/L    Bicarbonate 21 21 - 32 mmol/L    Anion Gap 12 10 - 20 mmol/L    Urea Nitrogen 69 (H) 6 - 23 mg/dL    Creatinine 2.34 (H) 0.50 - 1.30 mg/dL    eGFR 28 (L) >60 mL/min/1.73m*2    Calcium 8.3 (L) 8.6 - 10.3 mg/dL    Albumin 2.1 (L) 3.4 - 5.0 g/dL    Alkaline Phosphatase 52 33 -  136 U/L    Total Protein 6.1 (L) 6.4 - 8.2 g/dL    AST 8 (L) 9 - 39 U/L    Bilirubin, Total 0.3 0.0 - 1.2 mg/dL    ALT 3 (L) 10 - 52 U/L   Coagulation Screen   Result Value Ref Range    Protime 15.0 (H) 9.8 - 12.8 seconds    INR 1.3 (H) 0.9 - 1.1    aPTT 42 (H) 27 - 38 seconds   Type and Screen   Result Value Ref Range    ABO TYPE O     Rh TYPE POS     ANTIBODY SCREEN NEG    POCT GLUCOSE   Result Value Ref Range    POCT Glucose 107 (H) 74 - 99 mg/dL   Urinalysis with Reflex Culture and Microscopic   Result Value Ref Range    Color, Urine Yellow Straw, Yellow    Appearance, Urine Hazy (N) Clear    Specific Gravity, Urine 1.029 1.005 - 1.035    pH, Urine 5.0 5.0, 5.5, 6.0, 6.5, 7.0, 7.5, 8.0    Protein, Urine 100 (2+) (N) NEGATIVE mg/dL    Glucose, Urine >=500 (3+) (A) NEGATIVE mg/dL    Blood, Urine LARGE (3+) (A) NEGATIVE    Ketones, Urine 5 (TRACE) (A) NEGATIVE mg/dL    Bilirubin, Urine NEGATIVE NEGATIVE    Urobilinogen, Urine <2.0 <2.0 mg/dL    Nitrite, Urine NEGATIVE NEGATIVE    Leukocyte Esterase, Urine MODERATE (2+) (A) NEGATIVE   Microscopic Only, Urine   Result Value Ref Range    WBC, Urine >50 (A) 1-5, NONE /HPF    RBC, Urine >20 (A) NONE, 1-2, 3-5 /HPF    Budding Yeast, Urine PRESENT (A) NONE /HPF   Magnesium   Result Value Ref Range    Magnesium 2.10 1.60 - 2.40 mg/dL   CBC   Result Value Ref Range    WBC 6.5 4.4 - 11.3 x10*3/uL    nRBC 0.0 0.0 - 0.0 /100 WBCs    RBC 2.40 (L) 4.50 - 5.90 x10*6/uL    Hemoglobin 7.0 (L) 13.5 - 17.5 g/dL    Hematocrit 22.8 (L) 41.0 - 52.0 %    MCV 95 80 - 100 fL    MCH 29.2 26.0 - 34.0 pg    MCHC 30.7 (L) 32.0 - 36.0 g/dL    RDW 15.7 (H) 11.5 - 14.5 %    Platelets 217 150 - 450 x10*3/uL   Basic metabolic panel   Result Value Ref Range    Glucose 117 (H) 74 - 99 mg/dL    Sodium 140 136 - 145 mmol/L    Potassium 5.0 3.5 - 5.3 mmol/L    Chloride 112 (H) 98 - 107 mmol/L    Bicarbonate 19 (L) 21 - 32 mmol/L    Anion Gap 14 10 - 20 mmol/L    Urea Nitrogen 65 (H) 6 - 23 mg/dL     Creatinine 2.32 (H) 0.50 - 1.30 mg/dL    eGFR 28 (L) >60 mL/min/1.73m*2    Calcium 8.0 (L) 8.6 - 10.3 mg/dL   Phosphorus   Result Value Ref Range    Phosphorus 5.5 (H) 2.5 - 4.9 mg/dL   C-reactive protein   Result Value Ref Range    C-Reactive Protein 6.16 (H) <1.00 mg/dL   PST Top   Result Value Ref Range    Extra Tube Hold for add-ons.    POCT GLUCOSE   Result Value Ref Range    POCT Glucose 110 (H) 74 - 99 mg/dL        Impression:    Previous hand infection treated with an I&D with eschar noted    Recommendation:    Recommend continued dressing changes just with Xeroform over the eschar only.  Any remaining sutures that are intact over the right side can be removed.  Preferably be done by nursing here if not can be done when follow-up with his hand surgeon upon discharge.  Based on examination no intervention is required.  Should worsening ever occur and the patient revert back to an infection or abscess which she does not have at this time recommend transfer to a facility with hand coverage.  Orthopedics does not cover any hand inpatient services at this institution.  To my knowledge there is no hand coverage at this institution as well for inpatients.  Running off during this admission.  Once again follow-up with his hand surgeon who initiated surgical treatment.             Trevor Cornell MD

## 2024-03-09 NOTE — PROGRESS NOTES
Internal Medicine Progress Note    Patient Name: Vamsi Benson          MRN: [id number]  Today's Date: March 9, 2024          Attending: Rufino Osman MD    Subjective:  Patient was seen and examined at bedside.    Review Of Systems:  GENERAL: generalized weakness  CARDIOVASCULAR: Negative for chest pain, leg swelling  RESPIRATORY: Negative for shortness of breath  GI: No nausea, vomiting, or diarrhea  MUSCULOSKELETAL: Negative for joint pain or swelling    Objective:  Vitals:    03/09/24 0400 03/09/24 0800 03/09/24 1200 03/09/24 1600   BP: 143/71 145/82 (!) 169/36 151/84   BP Location: Left arm      Patient Position: Sitting      Pulse: 75 74 58 67   Resp: 18 18 18 18   Temp: 36.4 °C (97.5 °F) 36.3 °C (97.3 °F) 36 °C (96.8 °F) 36 °C (96.8 °F)   TempSrc: Temporal      SpO2: 98% 97% 99% 98%   Weight:       Height:               Physical Exam:   General appearance: Well-appearing alert, in no acute distress   Lungs: Clear to auscultation, no wheezing or rhonchi  Heart: RRR without murmur, gallop, or rubs.  Abdomen: Soft, non-tender. Bowel sounds normal.  Extremities: No deformity, no edema  Neuro: Alert oriented x3, no focal deficit.    Labs:  Results for orders placed or performed during the hospital encounter of 03/08/24 (from the past 24 hour(s))   Blood Culture    Specimen: Peripheral Venipuncture; Blood culture   Result Value Ref Range    Blood Culture Loaded on Instrument - Culture in progress    Blood Culture    Specimen: Peripheral Venipuncture; Blood culture   Result Value Ref Range    Blood Culture Loaded on Instrument - Culture in progress    POCT GLUCOSE   Result Value Ref Range    POCT Glucose 107 (H) 74 - 99 mg/dL   Urinalysis with Reflex Culture and Microscopic   Result Value Ref Range    Color, Urine Yellow Straw, Yellow    Appearance, Urine Hazy (N) Clear    Specific Gravity, Urine 1.029 1.005 - 1.035    pH, Urine 5.0 5.0, 5.5, 6.0, 6.5, 7.0, 7.5, 8.0    Protein, Urine 100 (2+) (N) NEGATIVE mg/dL     Glucose, Urine >=500 (3+) (A) NEGATIVE mg/dL    Blood, Urine LARGE (3+) (A) NEGATIVE    Ketones, Urine 5 (TRACE) (A) NEGATIVE mg/dL    Bilirubin, Urine NEGATIVE NEGATIVE    Urobilinogen, Urine <2.0 <2.0 mg/dL    Nitrite, Urine NEGATIVE NEGATIVE    Leukocyte Esterase, Urine MODERATE (2+) (A) NEGATIVE   Extra Urine Gray Tube   Result Value Ref Range    Extra Tube Hold for add-ons.    Microscopic Only, Urine   Result Value Ref Range    WBC, Urine >50 (A) 1-5, NONE /HPF    RBC, Urine >20 (A) NONE, 1-2, 3-5 /HPF    Budding Yeast, Urine PRESENT (A) NONE /HPF   Magnesium   Result Value Ref Range    Magnesium 2.10 1.60 - 2.40 mg/dL   CBC   Result Value Ref Range    WBC 6.5 4.4 - 11.3 x10*3/uL    nRBC 0.0 0.0 - 0.0 /100 WBCs    RBC 2.40 (L) 4.50 - 5.90 x10*6/uL    Hemoglobin 7.0 (L) 13.5 - 17.5 g/dL    Hematocrit 22.8 (L) 41.0 - 52.0 %    MCV 95 80 - 100 fL    MCH 29.2 26.0 - 34.0 pg    MCHC 30.7 (L) 32.0 - 36.0 g/dL    RDW 15.7 (H) 11.5 - 14.5 %    Platelets 217 150 - 450 x10*3/uL   Basic metabolic panel   Result Value Ref Range    Glucose 117 (H) 74 - 99 mg/dL    Sodium 140 136 - 145 mmol/L    Potassium 5.0 3.5 - 5.3 mmol/L    Chloride 112 (H) 98 - 107 mmol/L    Bicarbonate 19 (L) 21 - 32 mmol/L    Anion Gap 14 10 - 20 mmol/L    Urea Nitrogen 65 (H) 6 - 23 mg/dL    Creatinine 2.32 (H) 0.50 - 1.30 mg/dL    eGFR 28 (L) >60 mL/min/1.73m*2    Calcium 8.0 (L) 8.6 - 10.3 mg/dL   Phosphorus   Result Value Ref Range    Phosphorus 5.5 (H) 2.5 - 4.9 mg/dL   Procalcitonin   Result Value Ref Range    Procalcitonin 0.37 (H) <=0.07 ng/mL   C-reactive protein   Result Value Ref Range    C-Reactive Protein 6.16 (H) <1.00 mg/dL   PST Top   Result Value Ref Range    Extra Tube Hold for add-ons.    POCT GLUCOSE   Result Value Ref Range    POCT Glucose 110 (H) 74 - 99 mg/dL   POCT GLUCOSE   Result Value Ref Range    POCT Glucose 116 (H) 74 - 99 mg/dL   POCT GLUCOSE   Result Value Ref Range    POCT Glucose 109 (H) 74 - 99 mg/dL   Hemoglobin  "and hematocrit, blood   Result Value Ref Range    Hemoglobin 8.0 (L) 13.5 - 17.5 g/dL    Hematocrit 27.2 (L) 41.0 - 52.0 %       Medications:  Scheduled medications  atorvastatin, 80 mg, oral, Nightly  brimonidine, 1 drop, Right Eye, BID  carbidopa-levodopa, 1 tablet, oral, BID  cefTRIAXone, 2 g, intravenous, q24h  cholecalciferol, 4,000 Units, oral, Daily  gabapentin, 100 mg, oral, BID  [Held by provider] glipiZIDE, 2.5 mg, oral, BID AC  insulin lispro, 0-10 Units, subcutaneous, q4h  [Held by provider] insulin lispro, 4 Units, subcutaneous, Before meals & nightly  metoprolol succinate XL, 50 mg, oral, Daily  pantoprazole, 40 mg, intravenous, BID  sodium chloride 0.9%, 10 mL, intravenous, BID  thiamine, 100 mg, oral, Daily      Continuous medications     PRN medications  PRN medications: acetaminophen, dextrose, glucagon, ipratropium-albuteroL, melatonin, oxygen, polyethylene glycol      Assessment/Plan:  Principal Problem:    Gastrointestinal hemorrhage with melena  Continue PPI  Monitor blood count  Consult GI      Hyperlipidemia  Continue atorvastatin      HTN (hypertension)  Continue metoprolol      Extensor tenosynovitis of right wrist  Continue 2 g Rocephin IV daily  ID is following  Orthopedic surgery is following      Atrial fibrillation (CMS/[facility])  Rate controlled  Continue monitoring    Streptococcosis    Insulin dependent type 2 diabetes mellitus (CMS/[facility])  Sliding scale insulin      FRANCIE (acute kidney injury) (CMS/[facility])  Monitor kidney function  Continue IV fluids  Consult nephrology    Discussed with patient, YONATAN Osman MD   Date: 03/09/24  Time: 6:11 PM    This note was partially created using voice recognition software and is inherently subject to errors including those of syntax and \"sound-alike\" substitutions which may escape proofreading. In such instances, original meaning may be extrapolated by contextual derivation    "

## 2024-03-09 NOTE — PROGRESS NOTES
"Physical Therapy                 Therapy Communication Note    Patient Name: Vamsi Benson  MRN: [id number]  Today's Date: 3/9/2024     Discipline: Physical Therapy    Missed Visit Reason:      Missed Time: Attempt    Comment: Pt declined PT eval today 2/2 fatigue and not feeling well. Pt reports \"my doctor said I have fluid in my lungs.\" Nurse reports pt has not been tolerating much activity today and recommended holding therapy today. Will attempt again when pt appropriate for therapy.  "

## 2024-03-09 NOTE — CONSULTS
Infectious Disease Consult    PATIENT NAME: Vamsi Benson    MRN: [id number]  SERVICE DATE:  3/9/2024   SERVICE TIME:  9:11 AM    SIGNATURE: Aneudy Raymond MD    PRIMARY CARE PHYSICIAN: Rufino Osman MD  REASON FOR CONSULT: Right hand infection, latent syphilis  REQUESTING PHYSICIAN:       HPI  78-year-old male with past medical history as listed below who was sent to the ER due to 2 abnormal labs with low H&H hemoglobin of 7 and hematocrit of 22.3.  No leukocytosis.  No fever.  CRP 6.1.  The patient had recent admission to [facility] for right hand infection found to have blood culture positive for group A strep, imaging showed tenosynovitis and abscess, underwent I&D on 2/23, wound culture grew MRSA and group A strep.  Discharged on ceftriaxone through 3/27 through Chan catheter.  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  ID team was consulted for antibiotics management.    PAST MEDICAL HISTORY:   Past Medical History:   Diagnosis Date    Atrial fibrillation (CMS/[facility]) 08/20/2016    Bell's palsy 12/04/2014    CAD (coronary artery disease) 11/24/2009    Formatting of this note might be different from the original. History: ALISIA to RCA in 2007. 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) 08/17/2016    MGUS (monoclonal  gammopathy of unknown significance) 02/27/2024    MI (myocardial infarction) (CMS/[facility])     Normocytic anemia 03/01/2024    Obesity, unspecified 08/20/2016    Parkinson's disease 02/22/2024    Polyneuropathy 01/16/2023    Pseudophakia of both eyes 03/08/2024    PVC (premature ventricular contraction) 11/24/2009    Retinal hemorrhage     Retinal ischemia     TIA (transient ischemic attack)     Vertigo     Vitamin D deficiency      PAST SURGICAL HISTORY:   Past Surgical History:   Procedure Laterality Date    ABSCESS DRAINAGE      Renal    CATARACT EXTRACTION      CORONARY ARTERY BYPASS GRAFT  2016    x5 vessels    CORONARY STENT PLACEMENT      x2    DENTAL SURGERY      DESCEMETS STRIPPING AUTOMATED ENDOTHELIAL KERATOPLASTY Right     ESOPHAGOGASTRODUODENOSCOPY  03/04/2024    3 nonbleeding ulcers and swelling of the gastric outlet    HAND SURGERY Right 02/23/2024    Right hand incision and drainage down to and including tenosynovium, excisional     FAMILY HISTORY:   Family History   Problem Relation Name Age of Onset    Heart disease Mother      Heart disease Father       SOCIAL HISTORY:   Social History     Tobacco Use    Smoking status: Never    Smokeless tobacco: Never   Substance Use Topics    Alcohol use: Never    Drug use: Never     CURRENT ALLERGIES:   Allergies as of 03/08/2024 - Reviewed 03/08/2024   Allergen Reaction Noted    Codeine Other 03/08/2024    Erythromycin GI Upset 03/08/2024    Meperidine GI Upset     Metformin Diarrhea and GI Upset     Penicillins GI Upset 03/08/2024    Oxycodone Hives 03/08/2024     MEDICATIONS:    Current Facility-Administered Medications:     acetaminophen (Tylenol) tablet 650 mg, 650 mg, oral, q4h PRN, BRENDA Navarro    atorvastatin (Lipitor) tablet 80 mg, 80 mg, oral, Nightly, BRENDA Navarro    brimonidine (AlphaGAN) 0.2 % ophthalmic solution 1 drop, 1 drop, Right Eye, BID, BRENDA Navarro    carbidopa-levodopa (Sinemet)  mg per tablet 1  tablet, 1 tablet, oral, BID, BRENDA Navarro    cefTRIAXone (Rocephin) 2 g IV in dextrose 5% 50 mL, 2 g, intravenous, q24h, BRENDA Navarro, Stopped at 03/09/24 0219    cholecalciferol (Vitamin D-3) tablet 4,000 Units, 4,000 Units, oral, Daily, BRENDA Navarro    dextrose 50 % injection 25 g, 25 g, intravenous, q15 min PRN, BRENDA Navarro    gabapentin (Neurontin) capsule 100 mg, 100 mg, oral, BID, BRENDA Navarro    [Held by provider] glipiZIDE (Glucotrol) tablet 2.5 mg, 2.5 mg, oral, BID AC, BRENDA Navarro    glucagon (Glucagen) injection 1 mg, 1 mg, intramuscular, q15 min PRN, BRENDA Navarro    insulin lispro (HumaLOG) injection 0-10 Units, 0-10 Units, subcutaneous, q4h, BRENDA Navarro    [Held by provider] insulin lispro (HumaLOG) injection 4 Units, 4 Units, subcutaneous, Before meals & nightly, BRENDA Navarro    ipratropium-albuteroL (Duo-Neb) 0.5-2.5 mg/3 mL nebulizer solution 3 mL, 3 mL, nebulization, q2h PRN, BRENDA Navarro    melatonin tablet 3 mg, 3 mg, oral, Daily PRN, BRENDA Navarro    metoprolol succinate XL (Toprol-XL) 24 hr tablet 50 mg, 50 mg, oral, Daily, BRENDA Navarro    oxygen (O2) therapy, , inhalation, Continuous PRN - O2/gases, BRENDA Navarro    pantoprazole (ProtoNix) injection 40 mg, 40 mg, intravenous, BID, BRENDA Hendrix    polyethylene glycol (Glycolax, Miralax) packet 17 g, 17 g, oral, Daily PRN, BRENDA Navarro    sodium chloride 0.9% flush 10 mL, 10 mL, intravenous, BID, BRENDA Navarro, 10 mL at 03/09/24 0203    sodium chloride 0.9% infusion, 100 mL/hr, intravenous, Continuous, BRENDA Navarro, Last Rate: 100 mL/hr at 03/09/24 0205, 100 mL/hr at 03/09/24 0205    thiamine (Vitamin B-1) tablet 100 mg, 100 mg, oral, Daily, BRENDA Navarro       COMPLETE REVIEW OF SYSTEMS:    Review of systems shows no findings other  "than what is described in the narrative above.  Specifically, the patient has had no significant changes in eyesight, no sore throat, no post nasal drip, no ear pains.  There has been no cough or chest pains, pleuritic or otherwise.  There has been no abdominal pain, no nausea or vomiting, nor diarrhea.   There has been no dysuria, urinary frequency, or flank pain.  There is nothing unusual in the joints or muscles, or any skin rashes or skin swellings or abscesses noted.   All other systems were reviewed and are negative.        PHYSICAL EXAM:  Patient Vitals for the past 24 hrs:   BP Temp Temp src Pulse Resp SpO2 Height Weight   03/09/24 0800 145/82 36.3 °C (97.3 °F) -- 74 18 97 % -- --   03/09/24 0400 143/71 36.4 °C (97.5 °F) Temporal 75 18 98 % -- --   03/09/24 0000 150/73 36.3 °C (97.3 °F) Temporal 68 18 98 % -- --   03/08/24 2200 145/89 36.4 °C (97.5 °F) Temporal 83 20 91 % 1.88 m (6' 2\") 91.4 kg (201 lb 8 oz)   03/08/24 2102 -- -- -- 84 (!) 22 97 % -- --   03/08/24 2100 149/72 -- -- 78 (!) 24 -- -- --   03/08/24 2030 154/74 -- -- 85 (!) 22 (!) 92 % -- --   03/08/24 2010 135/70 -- -- 83 (!) 22 (!) 93 % -- --   03/08/24 1900 146/71 -- -- 69 17 96 % -- --   03/08/24 1830 155/69 -- -- 73 18 97 % -- --   03/08/24 1800 149/70 -- -- 69 15 97 % -- --   03/08/24 1730 148/85 -- -- 66 14 96 % -- --   03/08/24 1700 151/77 -- -- 78 20 95 % -- --   03/08/24 1636 157/68 37.4 °C (99.3 °F) Temporal 72 20 95 % 1.829 m (6') 90.7 kg (200 lb)     Body mass index is 25.87 kg/m².  Gen: NAD  Neck: symmetric, no mass  Cardiovascular: RRR  Respiratory: No distress   GI: Abd soft, nontender, non-distended  Extremities: no leg edema, right hand dorsal surgical wound with stitches, no significant erythema, mild swelling.  Skin: Warm and dry.  Neuro: alert and oriented times 3  : no ramirez     Labs:  Lab Results   Component Value Date    WBC 6.5 03/09/2024    HGB 7.0 (L) 03/09/2024    HCT 22.8 (L) 03/09/2024    MCV 95 03/09/2024    PLT " "217 03/09/2024     Lab Results   Component Value Date    GLUCOSE 117 (H) 03/09/2024    CALCIUM 8.0 (L) 03/09/2024     03/09/2024    K 5.0 03/09/2024    CO2 19 (L) 03/09/2024     (H) 03/09/2024    BUN 65 (H) 03/09/2024    CREATININE 2.32 (H) 03/09/2024   ESR: --No results found for: \"SEDRATE\"  Lab Results   Component Value Date    CRP 6.16 (H) 03/09/2024     Lab Results   Component Value Date    ALT 3 (L) 03/08/2024    AST 8 (L) 03/08/2024    ALKPHOS 52 03/08/2024    BILITOT 0.3 03/08/2024       DATA:   Diagnostic tests reviewed for today's visit:    Labs this admission reviewed  Imagings this admission reviewed  Cultures: Reviewed        ASSESSMENT :   -Recent right hand abscess with tenosynovitis status post I&D on 2/23 at [facility]  -Recent group A strep bacteremia  -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   Infectious Disease Attending        "

## 2024-03-10 LAB
ANION GAP SERPL CALC-SCNC: 14 MMOL/L (ref 10–20)
BUN SERPL-MCNC: 60 MG/DL (ref 6–23)
CALCIUM SERPL-MCNC: 8.2 MG/DL (ref 8.6–10.3)
CHLORIDE SERPL-SCNC: 113 MMOL/L (ref 98–107)
CO2 SERPL-SCNC: 18 MMOL/L (ref 21–32)
CREAT SERPL-MCNC: 2.21 MG/DL (ref 0.5–1.3)
EGFRCR SERPLBLD CKD-EPI 2021: 30 ML/MIN/1.73M*2
ERYTHROCYTE [DISTWIDTH] IN BLOOD BY AUTOMATED COUNT: 16.1 % (ref 11.5–14.5)
GLUCOSE BLD MANUAL STRIP-MCNC: 103 MG/DL (ref 74–99)
GLUCOSE BLD MANUAL STRIP-MCNC: 107 MG/DL (ref 74–99)
GLUCOSE BLD MANUAL STRIP-MCNC: 152 MG/DL (ref 74–99)
GLUCOSE BLD MANUAL STRIP-MCNC: 185 MG/DL (ref 74–99)
GLUCOSE SERPL-MCNC: 108 MG/DL (ref 74–99)
HCT VFR BLD AUTO: 26 % (ref 41–52)
HGB BLD-MCNC: 7.7 G/DL (ref 13.5–17.5)
MAGNESIUM SERPL-MCNC: 2.17 MG/DL (ref 1.6–2.4)
MCH RBC QN AUTO: 29.6 PG (ref 26–34)
MCHC RBC AUTO-ENTMCNC: 29.6 G/DL (ref 32–36)
MCV RBC AUTO: 100 FL (ref 80–100)
NRBC BLD-RTO: 0 /100 WBCS (ref 0–0)
PLATELET # BLD AUTO: 225 X10*3/UL (ref 150–450)
POTASSIUM SERPL-SCNC: 4.7 MMOL/L (ref 3.5–5.3)
RBC # BLD AUTO: 2.6 X10*6/UL (ref 4.5–5.9)
SODIUM SERPL-SCNC: 140 MMOL/L (ref 136–145)
WBC # BLD AUTO: 5.5 X10*3/UL (ref 4.4–11.3)

## 2024-03-10 PROCEDURE — 2500000001 HC RX 250 WO HCPCS SELF ADMINISTERED DRUGS (ALT 637 FOR MEDICARE OP): Performed by: NURSE PRACTITIONER

## 2024-03-10 PROCEDURE — 2500000004 HC RX 250 GENERAL PHARMACY W/ HCPCS (ALT 636 FOR OP/ED): Performed by: INTERNAL MEDICINE

## 2024-03-10 PROCEDURE — 82947 ASSAY GLUCOSE BLOOD QUANT: CPT

## 2024-03-10 PROCEDURE — 2500000004 HC RX 250 GENERAL PHARMACY W/ HCPCS (ALT 636 FOR OP/ED): Performed by: NURSE PRACTITIONER

## 2024-03-10 PROCEDURE — 85027 COMPLETE CBC AUTOMATED: CPT | Performed by: NURSE PRACTITIONER

## 2024-03-10 PROCEDURE — 83735 ASSAY OF MAGNESIUM: CPT | Performed by: NURSE PRACTITIONER

## 2024-03-10 PROCEDURE — 80048 BASIC METABOLIC PNL TOTAL CA: CPT | Performed by: NURSE PRACTITIONER

## 2024-03-10 PROCEDURE — 1200000002 HC GENERAL ROOM WITH TELEMETRY DAILY

## 2024-03-10 PROCEDURE — C9113 INJ PANTOPRAZOLE SODIUM, VIA: HCPCS | Performed by: NURSE PRACTITIONER

## 2024-03-10 RX ADMIN — BRIMONIDINE TARTRATE 1 DROP: 2 SOLUTION OPHTHALMIC at 09:04

## 2024-03-10 RX ADMIN — CARBIDOPA AND LEVODOPA 1 TABLET: 25; 100 TABLET ORAL at 21:16

## 2024-03-10 RX ADMIN — GABAPENTIN 100 MG: 100 CAPSULE ORAL at 09:07

## 2024-03-10 RX ADMIN — PANTOPRAZOLE SODIUM 40 MG: 40 INJECTION, POWDER, FOR SOLUTION INTRAVENOUS at 21:16

## 2024-03-10 RX ADMIN — GABAPENTIN 100 MG: 100 CAPSULE ORAL at 21:16

## 2024-03-10 RX ADMIN — Medication 10 ML: at 09:13

## 2024-03-10 RX ADMIN — CEFTRIAXONE SODIUM 2 G: 2 INJECTION, SOLUTION INTRAVENOUS at 23:59

## 2024-03-10 RX ADMIN — Medication 10 ML: at 21:16

## 2024-03-10 RX ADMIN — CARBIDOPA AND LEVODOPA 1 TABLET: 25; 100 TABLET ORAL at 09:06

## 2024-03-10 RX ADMIN — ATORVASTATIN CALCIUM 80 MG: 80 TABLET, FILM COATED ORAL at 21:16

## 2024-03-10 RX ADMIN — THIAMINE HCL TAB 100 MG 100 MG: 100 TAB at 09:06

## 2024-03-10 RX ADMIN — SODIUM BICARBONATE 650 MG: 650 TABLET ORAL at 09:06

## 2024-03-10 RX ADMIN — Medication 5 DROP: at 21:16

## 2024-03-10 RX ADMIN — Medication 4000 UNITS: at 09:06

## 2024-03-10 RX ADMIN — METOPROLOL SUCCINATE 50 MG: 50 TABLET, EXTENDED RELEASE ORAL at 09:06

## 2024-03-10 RX ADMIN — Medication 5 DROP: at 13:12

## 2024-03-10 RX ADMIN — PANTOPRAZOLE SODIUM 40 MG: 40 INJECTION, POWDER, FOR SOLUTION INTRAVENOUS at 09:07

## 2024-03-10 RX ADMIN — CEFTRIAXONE SODIUM 2 G: 2 INJECTION, SOLUTION INTRAVENOUS at 00:25

## 2024-03-10 RX ADMIN — SODIUM BICARBONATE 650 MG: 650 TABLET ORAL at 21:16

## 2024-03-10 ASSESSMENT — COGNITIVE AND FUNCTIONAL STATUS - GENERAL
DRESSING REGULAR LOWER BODY CLOTHING: A LOT
WALKING IN HOSPITAL ROOM: A LOT
CLIMB 3 TO 5 STEPS WITH RAILING: TOTAL
MOVING FROM LYING ON BACK TO SITTING ON SIDE OF FLAT BED WITH BEDRAILS: A LOT
MOVING TO AND FROM BED TO CHAIR: A LOT
PERSONAL GROOMING: A LOT
STANDING UP FROM CHAIR USING ARMS: A LOT
MOBILITY SCORE: 11
DRESSING REGULAR UPPER BODY CLOTHING: A LOT
MOBILITY SCORE: 10
TURNING FROM BACK TO SIDE WHILE IN FLAT BAD: A LOT
MOVING TO AND FROM BED TO CHAIR: A LOT
MOVING FROM LYING ON BACK TO SITTING ON SIDE OF FLAT BED WITH BEDRAILS: A LOT
DAILY ACTIVITIY SCORE: 12
TURNING FROM BACK TO SIDE WHILE IN FLAT BAD: A LOT
STANDING UP FROM CHAIR USING ARMS: A LOT
EATING MEALS: A LOT
HELP NEEDED FOR BATHING: A LOT
TOILETING: A LOT
WALKING IN HOSPITAL ROOM: TOTAL
CLIMB 3 TO 5 STEPS WITH RAILING: TOTAL

## 2024-03-10 ASSESSMENT — PAIN SCALES - GENERAL
PAINLEVEL_OUTOF10: 0 - NO PAIN

## 2024-03-10 ASSESSMENT — PAIN - FUNCTIONAL ASSESSMENT
PAIN_FUNCTIONAL_ASSESSMENT: 0-10

## 2024-03-10 NOTE — PROGRESS NOTES
Internal Medicine Progress Note    Patient Name: Vamsi Benson          MRN: [id number]  Today's Date: March 10, 2024          Attending: Rufino Osman MD    Subjective:  Patient was seen and examined at bedside.    Review Of Systems:  GENERAL: generalized weakness  CARDIOVASCULAR: Negative for chest pain, leg swelling  RESPIRATORY: Negative for shortness of breath  GI: No nausea, vomiting, or diarrhea    Objective:  Vitals:    03/10/24 0400 03/10/24 0600 03/10/24 0800 03/10/24 1200   BP: 135/55  134/66 136/67   BP Location: Left arm  Right arm Right arm   Patient Position: Lying  Lying Lying   Pulse: 67  67 71   Resp: 16  16 16   Temp: 36.7 °C (98.1 °F)  36.8 °C (98.2 °F) 36.7 °C (98.1 °F)   TempSrc: Temporal  Temporal Temporal   SpO2: 98%  99% 96%   Weight:  90.4 kg (199 lb 4.7 oz)     Height:               Physical Exam:   General appearance: Well-appearing alert, in no acute distress   Lungs: Clear to auscultation, no wheezing or rhonchi  Heart: RRR without murmur, gallop, or rubs.  Abdomen: Soft, non-tender. Bowel sounds normal.  Extremities: Right hand is wrapped with dressing  Neuro: Alert oriented x3, no focal deficit.    Labs:  Results for orders placed or performed during the hospital encounter of 03/08/24 (from the past 24 hour(s))   POCT GLUCOSE   Result Value Ref Range    POCT Glucose 109 (H) 74 - 99 mg/dL   Hemoglobin and hematocrit, blood   Result Value Ref Range    Hemoglobin 8.0 (L) 13.5 - 17.5 g/dL    Hematocrit 27.2 (L) 41.0 - 52.0 %   Creatine Kinase   Result Value Ref Range    Creatine Kinase 17 0 - 325 U/L   Uric Acid   Result Value Ref Range    Uric Acid 8.7 (H) 4.0 - 7.5 mg/dL   POCT GLUCOSE   Result Value Ref Range    POCT Glucose 152 (H) 74 - 99 mg/dL   Sodium, Urine Random   Result Value Ref Range    Sodium, Urine Random 35 mmol/L    Creatinine, Urine Random 65.6 20.0 - 370.0 mg/dL    Sodium/Creatinine Ratio 53 Not established. mmol/g Creat   Chloride, Urine Random   Result Value Ref Range     Chloride, Urine Random 40 mmol/L    Creatinine, Urine Random 65.6 20.0 - 370.0 mg/dL    Chloride/Creatinine Ratio 61 23 - 275 mmol/g creat   POCT GLUCOSE   Result Value Ref Range    POCT Glucose 103 (H) 74 - 99 mg/dL   POCT GLUCOSE   Result Value Ref Range    POCT Glucose 107 (H) 74 - 99 mg/dL   Magnesium   Result Value Ref Range    Magnesium 2.17 1.60 - 2.40 mg/dL   CBC   Result Value Ref Range    WBC 5.5 4.4 - 11.3 x10*3/uL    nRBC 0.0 0.0 - 0.0 /100 WBCs    RBC 2.60 (L) 4.50 - 5.90 x10*6/uL    Hemoglobin 7.7 (L) 13.5 - 17.5 g/dL    Hematocrit 26.0 (L) 41.0 - 52.0 %     80 - 100 fL    MCH 29.6 26.0 - 34.0 pg    MCHC 29.6 (L) 32.0 - 36.0 g/dL    RDW 16.1 (H) 11.5 - 14.5 %    Platelets 225 150 - 450 x10*3/uL   Basic metabolic panel   Result Value Ref Range    Glucose 108 (H) 74 - 99 mg/dL    Sodium 140 136 - 145 mmol/L    Potassium 4.7 3.5 - 5.3 mmol/L    Chloride 113 (H) 98 - 107 mmol/L    Bicarbonate 18 (L) 21 - 32 mmol/L    Anion Gap 14 10 - 20 mmol/L    Urea Nitrogen 60 (H) 6 - 23 mg/dL    Creatinine 2.21 (H) 0.50 - 1.30 mg/dL    eGFR 30 (L) >60 mL/min/1.73m*2    Calcium 8.2 (L) 8.6 - 10.3 mg/dL   POCT GLUCOSE   Result Value Ref Range    POCT Glucose 185 (H) 74 - 99 mg/dL       Medications:  Scheduled medications  atorvastatin, 80 mg, oral, Nightly  brimonidine, 1 drop, Right Eye, BID  carbamide peroxide, 5 drop, Left Ear, BID  carbidopa-levodopa, 1 tablet, oral, BID  cefTRIAXone, 2 g, intravenous, q24h  cholecalciferol, 4,000 Units, oral, Daily  gabapentin, 100 mg, oral, BID  [Held by provider] glipiZIDE, 2.5 mg, oral, BID AC  insulin lispro, 0-10 Units, subcutaneous, q4h  [Held by provider] insulin lispro, 4 Units, subcutaneous, Before meals & nightly  metoprolol succinate XL, 50 mg, oral, Daily  pantoprazole, 40 mg, intravenous, BID  sodium bicarbonate, 650 mg, oral, BID  sodium chloride 0.9%, 10 mL, intravenous, BID  thiamine, 100 mg, oral, Daily      Continuous medications     PRN medications  PRN  "medications: acetaminophen, dextrose, glucagon, ipratropium-albuteroL, melatonin, oxygen, polyethylene glycol      Assessment/Plan:  Principal Problem:    Gastrointestinal hemorrhage with melena  Continue PPI p.o. 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 In such instances, original meaning may be extrapolated by contextual derivation  "

## 2024-03-10 NOTE — PROGRESS NOTES
Department of Medicine  Division of Pulmonary, Critical Care, and Sleep Medicine    Vamsi Benson is a 78 y.o. male on day 2 of admission presenting with Gastrointestinal hemorrhage with melena.    Subjective   No acute events overnight.        Objective     Vital Signs      3/9/2024     4:00 PM 3/9/2024     8:00 PM 3/10/2024    12:00 AM 3/10/2024     4:00 AM 3/10/2024     6:00 AM 3/10/2024     8:00 AM 3/10/2024    12:00 PM   Vitals   Systolic 151 157 120 135  134 136   Diastolic 84 81 58 55  66 67   Heart Rate 67 72 70 67  67 71   Temp 36 °C (96.8 °F) 36.4 °C (97.5 °F) 37 °C (98.6 °F) 36.7 °C (98.1 °F)  36.8 °C (98.2 °F) 36.7 °C (98.1 °F)   Resp 18 16 16 16  16 16   Weight (lb)     199.3     BMI     25.59 kg/m2     BSA (m2)     2.17 m2            Labs:  Lab Results   Component Value Date    WBC 5.5 03/10/2024    HGB 7.7 (L) 03/10/2024    HCT 26.0 (L) 03/10/2024     03/10/2024     03/10/2024      Lab Results   Component Value Date    GLUCOSE 108 (H) 03/10/2024    CALCIUM 8.2 (L) 03/10/2024     03/10/2024    K 4.7 03/10/2024    CO2 18 (L) 03/10/2024     (H) 03/10/2024    BUN 60 (H) 03/10/2024    CREATININE 2.21 (H) 03/10/2024      Lab Results   Component Value Date    ALT 3 (L) 03/08/2024    AST 8 (L) 03/08/2024    ALKPHOS 52 03/08/2024    BILITOT 0.3 03/08/2024        Oxygen Therapy  SpO2: 96 %  Medical Gas Therapy: Supplemental oxygen  O2 Delivery Method: Nasal cannula       Intake/Output last 3 Shifts:  I/O last 3 completed shifts:  In: 1451.7 (16.1 mL/kg) [P.O.:460; I.V.:991.7 (11 mL/kg)]  Out: 1150 (12.7 mL/kg) [Urine:1150 (0.4 mL/kg/hr)]  Weight: 90.4 kg       Medications   Scheduled medications  atorvastatin, 80 mg, oral, Nightly  brimonidine, 1 drop, Right Eye, BID  carbamide peroxide, 5 drop, Left Ear, BID  carbidopa-levodopa, 1 tablet, oral, BID  cefTRIAXone, 2 g, intravenous, q24h  cholecalciferol, 4,000 Units, oral, Daily  gabapentin, 100 mg, oral, BID  [Held by provider]  glipiZIDE, 2.5 mg, oral, BID AC  insulin lispro, 0-10 Units, subcutaneous, q4h  [Held by provider] insulin lispro, 4 Units, subcutaneous, Before meals & nightly  metoprolol succinate XL, 50 mg, oral, Daily  pantoprazole, 40 mg, intravenous, BID  sodium bicarbonate, 650 mg, oral, BID  sodium chloride 0.9%, 10 mL, intravenous, BID  thiamine, 100 mg, oral, Daily      Continuous medications     PRN medications  PRN medications: acetaminophen, dextrose, glucagon, ipratropium-albuteroL, melatonin, oxygen, polyethylene glycol     Allergies  Codeine, Erythromycin, Meperidine, Metformin, Penicillins, and Oxycodone      Chest Radiograph   No results found for this or any previous visit from the past 10 days.       XR chest 1 view 02/29/2024    Narrative  * * *Final Report* * *    DATE OF EXAM: Feb 29 2024  5:19PM    VHX   5290  -  XR CHEST 1V FRONTAL   / ACCESSION #  [id number]    PROCEDURE REASON: Shortness of breath    * * * * Physician Interpretation * * * *    EXAMINATION: XR CHEST 1V FRONTAL    CLINICAL HISTORY: Shortness of breath    COMPARISON: 02/21/2024      RESULT: See impression    Impression  IMPRESSION:    Lines, tubes, and devices: Right-sided central venous catheter in place  with tip seen superimposed over the expected location of the cavoatrial  junction.  Median sternotomy wires.    Lungs and pleura: Low volume inspiration.  Hazy interstitial and airspace  opacities in the lung bases may represent subsegmental atelectasis, or  edema or pneumonitis.  No lung consolidation. No pleural effusion. No  pneumothorax.    Cardiomediastinal silhouette: Stable cardiomediastinal silhouette.    Other: Median sternotomy wires.  Mild degenerative changes in the spine.    : PSC[facility]  Transcribe Date/Time: Feb 29 2024  6:01P    Dictated by : MESFIN CAMPA MD    This examination was interpreted and the report reviewed and  electronically signed by:  MESFIN CAMPA MD on Feb 29 2024  6:04PM  EST         Assessment  and Plan / Recommendations   Assessment/Plan     Bilateral pleural effusions  FRANCIE on CKD  Acute blood loss anemia / Melena  R hand abscess s/p I&D at [facility]  CAD s/p CABG  Atrial fibrillation   Dementia     Pleural effusions are symmetric, gravity dependant with no loculations or pleural thickening.   Likely related to overall hypervolemia and third spacing from recent resuscitation (GI bleed, sepsis).   The patient is not in acute distress thus no urgent indication for thoracentesis. With third spacing, fluid will likely re-accumulate if body volume is not optimized.      Recommend:  -- Repeat Echocardiogram.  -- Volume management - discussed with Dr. Marin, will plan for net even fluid balance today.   -- Bilateral thoracentesis will be deferred at this time. Please call back if respiratory status changed.      Danny Yeung MD    Please excuse any misspellings or unintended errors related to the Dragon speech recognition software used to dictate this note.

## 2024-03-10 NOTE — NURSING NOTE
EOS:  Rested comfortably throughout the night.  Right chest PICC line dressing changed and right hand dressing also changed.  Tolerating p.o. fluids well, good urine output.

## 2024-03-10 NOTE — CARE PLAN
Problem: Psychosocial Needs  Goal: Demonstrates ability to cope with hospitalization/illness  Outcome: Progressing  Goal: Collaborate with me, my family, and caregiver to identify my specific goals  Outcome: Progressing     Problem: Discharge Barriers  Goal: My discharge needs are met  Outcome: Progressing     Problem: Skin  Goal: Promote/optimize nutrition  Outcome: Progressing   The patient's goals for the shift include      The clinical goals for the shift include see care plan

## 2024-03-10 NOTE — CONSULTS
Department of Medicine  Division of Pulmonary, Critical Care, and Sleep Medicine    Reason For Consult  I was asked by Dr. Osman to evaluate . Vamsi Benson for bilateral pleural effusions.    History Of Present Illness  Vamsi Benson is a 78 y.o. male with multiple comorbidities and past medical history significant for hypertension, coronary artery disease status post CABG in 05/2016, atrial fibrillation, embolic CVA, vascular dementia and chronic kidney disease who presented to the hospital with acute on chronic anemia and melanotic stools over the past 2-3 days.  The patient was recently hospitalized at [facility] in February 2024 for right hand cellulitis status postsurgical debridement.  Blood cultures at that time was positive for group A strep.  Pulmonary is consulted now due to bilateral pleural effusion noted on CT scan of abdomen.  The patient is currently on 2 L/min of supplemental oxygen.  He denies acute shortness of breath at rest or persistent cough.  No acute chest pain.     Past Medical History:  Past Medical History:   Diagnosis Date    Atrial fibrillation (CMS/[facility]) 08/20/2016    Bell's palsy 12/04/2014    CAD (coronary artery disease) 11/24/2009    Formatting of this note might be different from the original. History: ALISIA to RCA in 2007. 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    Malnutrition of moderate degree (CMS/[facility]) 08/17/2016    MGUS (monoclonal  "gammopathy of unknown significance) 02/27/2024    MI (myocardial infarction) (CMS/[facility])     Normocytic anemia 03/01/2024    Obesity, unspecified 08/20/2016    Parkinson's disease 02/22/2024    Polyneuropathy 01/16/2023    Pseudophakia of both eyes 03/08/2024    PVC (premature ventricular contraction) 11/24/2009    Retinal hemorrhage     Retinal ischemia     TIA (transient ischemic attack)     Vertigo     Vitamin D deficiency        Surgical History:  Past Surgical History:   Procedure Laterality Date    ABSCESS DRAINAGE      Renal    CATARACT EXTRACTION      CORONARY ARTERY BYPASS GRAFT  2016    x5 vessels    CORONARY STENT PLACEMENT      x2    DENTAL SURGERY      DESCEMETS STRIPPING AUTOMATED ENDOTHELIAL KERATOPLASTY Right     ESOPHAGOGASTRODUODENOSCOPY  03/04/2024    3 nonbleeding ulcers and swelling of the gastric outlet    HAND SURGERY Right 02/23/2024    Right hand incision and drainage down to and including tenosynovium, excisional       Social History:   Social History     Tobacco Use    Smoking status: Never    Smokeless tobacco: Never   Substance Use Topics    Alcohol use: Never    Drug use: Never       Family History:  Family History   Problem Relation Name Age of Onset    Heart disease Mother      Heart disease Father          Vital Signs  Visit Vitals  /55 (BP Location: Left arm, Patient Position: Lying)   Pulse 67   Temp 36.7 °C (98.1 °F) (Temporal)   Resp 16   Ht 1.88 m (6' 2\")   Wt 90.4 kg (199 lb 4.7 oz)   SpO2 98%   BMI 25.59 kg/m²   Smoking Status Never   BSA 2.17 m²        Physical Exam  Vitals and nursing note reviewed.   Constitutional:       General: Chronically ill-appearing  HENT:      Head: Normocephalic and atraumatic.   Eyes:      Conjunctiva/sclera: Conjunctivae normal.   Cardiovascular:      Rate and Rhythm: Normal rate, distant S1, S2     Trace pitting edema in lower extremities   Pulmonary:      Effort: Pulmonary effort is normal. No respiratory distress.      Breath sounds: " Diminished breath sounds on anterior exam.   Skin:     General: Skin is warm and dry.       R hand dressings in place.  Neurological:      General: No focal deficit present.      Mental Status: + Dementia, A+O to self.       Oxygen Therapy  SpO2: 98 %  Medical Gas Therapy: Supplemental oxygen  O2 Delivery Method: Nasal cannula         Medications   Scheduled medications  atorvastatin, 80 mg, oral, Nightly  brimonidine, 1 drop, Right Eye, BID  carbidopa-levodopa, 1 tablet, oral, BID  cefTRIAXone, 2 g, intravenous, q24h  cholecalciferol, 4,000 Units, oral, Daily  gabapentin, 100 mg, oral, BID  [Held by provider] glipiZIDE, 2.5 mg, oral, BID AC  insulin lispro, 0-10 Units, subcutaneous, q4h  [Held by provider] insulin lispro, 4 Units, subcutaneous, Before meals & nightly  metoprolol succinate XL, 50 mg, oral, Daily  pantoprazole, 40 mg, intravenous, BID  sodium bicarbonate, 650 mg, oral, BID  sodium chloride 0.9%, 10 mL, intravenous, BID  thiamine, 100 mg, oral, Daily      Continuous medications     PRN medications  PRN medications: acetaminophen, dextrose, glucagon, ipratropium-albuteroL, melatonin, oxygen, polyethylene glycol     Allergies  Codeine, Erythromycin, Meperidine, Metformin, Penicillins, and Oxycodone      Lab Results     Lab Results   Component Value Date    WBC 6.5 03/09/2024    HGB 8.0 (L) 03/09/2024    HCT 27.2 (L) 03/09/2024    MCV 95 03/09/2024     03/09/2024      Lab Results   Component Value Date    GLUCOSE 117 (H) 03/09/2024    CALCIUM 8.0 (L) 03/09/2024     03/09/2024    K 5.0 03/09/2024    CO2 19 (L) 03/09/2024     (H) 03/09/2024    BUN 65 (H) 03/09/2024    CREATININE 2.32 (H) 03/09/2024      Lab Results   Component Value Date    ALT 3 (L) 03/08/2024    AST 8 (L) 03/08/2024    ALKPHOS 52 03/08/2024    BILITOT 0.3 03/08/2024        Chest Radiograph   CT chest wo IV contrast 02/29/2024    Narrative  * * *Final Report* * *    DATE OF EXAM: Feb 29 2024 10:15AM    [facility]   0541  -  CT  CHEST WO IVCON  / ACCESSION #  [id number]    PROCEDURE REASON: Hematemesis    * * * * Physician Interpretation * * * *    EXAMINATION:  CHEST CT WITHOUT CONTRAST    CLINICAL HISTORY:  Hematemesis hematemesis    Technique:  Spiral CT acquisition of the chest from the thoracic inlet to  the upper abdomen without contrast.  MQ:  [id number]  CT Dose-Length Product:  722 mGy*cm  CT Dose Reduction Employed: Automated exposure control(AEC) and iterative  recon    Comparison: 06/27/2020      RESULT:    Lines, tubes, and devices:  Right internal jugular central venous  catheter in the distal SVC.    Lung parenchyma and airways: Trachea and central airways are patent.  Lower lobe consolidative opacities.    Pleural space:  Moderate bilateral pleural effusions.    Lower neck, lymph nodes, and mediastinum:  No axillary, supraclavicular,  mediastinal or hilar lymphadenopathy by CT size criteria.    Heart, pericardium, and thoracic vessels:  The heart is normal in size.  No pericardial effusion.    The thoracic aorta and main pulmonary artery are normal in caliber.  Atherosclerotic calcifications of the thoracic aorta and coronary  arteries.    Bones/Soft Tissues: Degenerative changes in the thoracic spine.  Median  sternotomy.    Upper abdomen:  Trace perihepatic ascites.  Cholelithiasis.  Small hiatal  hernia.     (topogram) images: Unremarkable.    Impression  IMPRESSION:    Moderate bilateral pleural effusions with lower lobe passive atelectasis.                      : [facility]  Transcribe Date/Time: Feb 29 2024 11:52A    Dictated by : KARY SANDERS MD    This examination was interpreted and the report reviewed and  electronically signed by:  KARY SANDERS MD on Feb 29 2024 11:58AM  EST      XR chest 1 view 02/29/2024    Narrative  * * *Final Report* * *    DATE OF EXAM: Feb 29 2024  5:19PM    VHX   5290  -  XR CHEST 1V FRONTAL   / ACCESSION #  [id number]    PROCEDURE REASON: Shortness of breath    * * * *  Physician Interpretation * * * *    EXAMINATION: XR CHEST 1V FRONTAL    CLINICAL HISTORY: Shortness of breath    COMPARISON: 02/21/2024      RESULT: See impression    Impression  IMPRESSION:    Lines, tubes, and devices: Right-sided central venous catheter in place  with tip seen superimposed over the expected location of the cavoatrial  junction.  Median sternotomy wires.    Lungs and pleura: Low volume inspiration.  Hazy interstitial and airspace  opacities in the lung bases may represent subsegmental atelectasis, or  edema or pneumonitis.  No lung consolidation. No pleural effusion. No  pneumothorax.    Cardiomediastinal silhouette: Stable cardiomediastinal silhouette.    Other: Median sternotomy wires.  Mild degenerative changes in the spine.      : PSCB  Transcribe Date/Time: Feb 29 2024  6:01P    Dictated by : MESFIN CAMPA MD    This examination was interpreted and the report reviewed and  electronically signed by:  MESFIN CAMPA MD on Feb 29 2024  6:04PM  EST      Assessment and Plan / Recommendations   Assessment/Plan     Bilateral pleural effusions  FRANCIE on CKD  Acute blood loss anemia / Melena  R hand abscess s/p I&D at [facility]  CAD s/p CABG  Atrial fibrillation   Dementia    Pleural effusions are symmetric, gravity dependant with no loculations or pleural thickening.   Likely related to overall hypervolemia and third spacing.   The patient is not in acute distress thus no urgent indication for thoracentesis. With third spacing, fluid will likely re-accumulate if body volume is not optimized.     Recommend:  -- Repeat Echocardiogram unless it was recently done at [facility] - couldn't see Echo study in the chart.   -- Volume management - to be discussed with patient nephrologist. When clinically appropriate, would start IV lasix.   -- Bilateral thoracentesis will be deferred at this time.     Danny Yeung MD    Please excuse any misspellings or unintended errors related to the Dragon speech  recognition software used to dictate this note.

## 2024-03-10 NOTE — CARE PLAN
Problem: Psychosocial Needs  Goal: Demonstrates ability to cope with hospitalization/illness  3/10/2024 1835 by Terri Barr RN  Outcome: Progressing  3/10/2024 1834 by Terri Barr RN  Outcome: Progressing  Goal: Collaborate with me, my family, and caregiver to identify my specific goals  3/10/2024 1835 by Terri Barr RN  Outcome: Progressing  3/10/2024 1834 by Terri Barr RN  Outcome: Progressing     Problem: Discharge Barriers  Goal: My discharge needs are met  3/10/2024 1835 by Terri Barr RN  Outcome: Progressing  3/10/2024 1834 by Terri Barr RN  Outcome: Progressing     Problem: Skin  Goal: Promote/optimize nutrition  3/10/2024 1835 by Terri Barr RN  Outcome: Progressing  3/10/2024 1834 by Terri Barr RN  Outcome: Progressing   The patient's goals for the shift include      The clinical goals for the shift include see care plan

## 2024-03-10 NOTE — CARE PLAN
Problem: Psychosocial Needs  Goal: Demonstrates ability to cope with hospitalization/illness  Outcome: Progressing     Problem: Discharge Barriers  Goal: My discharge needs are met  Outcome: Progressing     Problem: Skin  Goal: Promote/optimize nutrition  Outcome: Progressing    The patient's goals for the shift include  rest    The clinical goals for the shift include patient will have pain managed in R arm     1930: Spoke with patient brother Flaquito on the phone, updated on plan of care    Patient having overall uneventful shift, patient is very low energy. Patient declined sitting on the side of the bed or wanting to do some range of motion exercises. Patient's R arm is swollen and elevated on a pillow. Patient took pills scheduled without any problems. Heels elevated, SCDs on, patient turning every 2 hours as tolerated. Patient encouraged to continue with as much independent activity as possible such as grabbing his own water cups and adjusting himself in bed.

## 2024-03-10 NOTE — PROGRESS NOTES
INPATIENT NEPHROLOGY CONSULT PROGRESS NOTE      Patient Name: Vamsi Benson MRN: [id number]  DATE of SERVICE: March 10, 2024  TIME of SERVICE: 12:40 PM  CONSULTING SERVICE: Nephrology    REASON for CONSULT: FRANCIE    SUBJECTIVE:  Seen and evaluated at bedside, resting in bed comfortably.   Intermittent dyspnea requiring 2 L of oxygen.  Mentation waxes and wanes.  Tolerating clear liquid diet.   Patient requiring assistance with feeding due to right hand injury.  Urine output 800 cc for the past 12 hours.  Blood pressure 130/60 with a heart rate of 67.  No more episodes of GI bleed since admission.  Patient had no b plan of care discussed with Dr. Weldon from pulmonology [facility]  over the past 24 hours.  Regarding bilateral pleural effusion suspected to be after resuscitation from his recent admission due to septic shock and GI bleed.  Serum creatinine 2.2 mg deciliter with EGFR of 30 mill per minute.    SUMMARY OF STAY:  Mr. Benson is a 78-year-old male who presented to [facility] March 8, 2024 for evaluation of GI bleed complicated with acute kidney injury.  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  Recent evaluation of right upper extremity swelling was negative for DVT.  History of GI bleed status post EGD March 4, 2024 which demonstrated 3 nonbleeding ulcers in swelling of gastric outlet.  Patient presented to [facility] from [place]  [place] for evaluation of acute blood loss anemia with drop in hemoglobin to 7 mg/dL associated with melena for 2 to 3 days prior to admission in addition to poor oral intake for 4 days.  Patient was found to be on Lovenox 100 mg subcu daily and Coumadin 2.5 mg p.o. daily at the facility. Labs demonstrated no acute kidney injury with a creatinine up to 2.3 mg deciliter from baseline creatinine of 1.2 mg deciliter, BUN up to 65, metabolic acidosis bicarb of 19, hyperchloremia with a chloride of 112, hyperkalemia with potassium of 5, anemia with hemoglobin 7 mg deciliter.    ASSESSMENT:  Acute kidney injury superimposed on chronic kidney disease stage II:  Acute kidney injury likely hemodynamically mediated which likely evolved into ATN the setting of acute blood loss anemia, acute GI bleed, recent to group a strep bacteremia requiring IV ceftriaxone.  -- Serum creatinine 2.3 mg deciliter with a EGFR of 28 mill per minute per 1.73 m2  -- Chronic urinary retention: Conner catheter in place with dark concentrated urine.   -- Urine culture in process     Volume: Euvolemic however with tendency to third space, hypoalbuminemia with albumin of 2  bilateral pleural effusion.   --Plan of care discussed with Dr. Weldon, tentative plan to target net even fluid balance for now.      Hyperkalemia: Due to FRANCIE, cellular shift in the setting of acidosis  Improved     Metabolic acidosis: Bicarb of 19  Oral sodium bicarbonates 650 mg p.o. 3 times daily     Acute blood loss anemia: Hemoglobin 7 mg/dL  --Acute GI bleed  -- Blood transfusion for hemoglobin less than 7     Concern for urinary tract infection:  Chronic indwelling Conner catheter in place.  Urine culture in process.      Recent right hand abscess status post I&D February 23.  Receiving IV ceftriaxone infectious disease following, appreciate recommendations.   Recent group A streptococcus bacteremia.  Latent syphilis     Atrial fibrillation managed with anticoagulation  and metoprolol     Associated condition: Parkinsonism, vascular dementia, diabetes     Plan:  Acute kidney injury in the setting of acute blood loss anemia, recent bacteremia, prolonged antibiotic use, suspected urinary tract infection:     --More hemodynamically stable, and no signs of GI bleed since presentation.  --Needs assistant with feeding  --To target net even fluid balance: No indication for IV fluid. Auto-diuresing.   -- Bilateral pleural effusion suspected to be secondary to recent resuscitation due to recent septic shock/gi bleed with component of third spacing.     -- Recurrent GI bleed while on Coumadin for atrial fibrillation.  Patient already with vascular dementia, parkinsonism, history of CVA.   Anticoagulation may not add significant benefit.    -- Oral sodium bicarbonates 650 mg p.o. 3 times daily.   -- Hyperphosphatemia no indication for phosphate binders in acute setting.   --Indwelling Conner catheter for chronic urinary retention  -- Medication reviewed, renally dosed to continue to adjust medication based on GFR.      Will follow, thank you!    Plan of care discussed with patient, pulmonology team, bedside nurse.     Medications:    Current Facility-Administered Medications:     acetaminophen (Tylenol) tablet 650 mg, 650 mg, oral, q4h PRN, BRENDA Navarro    atorvastatin (Lipitor) tablet 80 mg, 80 mg, oral, Nightly, BRENDA Navarro, 80 mg at 03/09/24 2022    brimonidine (AlphaGAN) 0.2 % ophthalmic solution 1 drop, 1 drop, Right Eye, BID, BRENDA Navarro, 1 drop at 03/10/24 0904    carbamide peroxide (Debrox) 6.5 % otic solution 5 drop, 5 drop, Left Ear, BID, BRENDA Landeros    carbidopa-levodopa (Sinemet)  mg per tablet 1 tablet, 1 tablet, oral, BID, BRENDA Navarro, 1 tablet at 03/10/24 0906    cefTRIAXone (Rocephin) 2 g IV in dextrose 5% 50 mL, 2 g, intravenous, q24h, BRENDA Navarro, Stopped at 03/10/24 0055    cholecalciferol  (Vitamin D-3) tablet 4,000 Units, 4,000 Units, oral, Daily, BRENDA Navarro, 4,000 Units at 03/10/24 0906    dextrose 50 % injection 25 g, 25 g, intravenous, q15 min PRN, BRENDA Navarro    gabapentin (Neurontin) capsule 100 mg, 100 mg, oral, BID, BRENDA Navarro, 100 mg at 03/10/24 0907    [Held by provider] glipiZIDE (Glucotrol) tablet 2.5 mg, 2.5 mg, oral, BID AC, BRENDA Navarro    glucagon (Glucagen) injection 1 mg, 1 mg, intramuscular, q15 min PRN, BRENDA Navarro    insulin lispro (HumaLOG) injection 0-10 Units, 0-10 Units, subcutaneous, q4h, BRENDA Navarro, 2 Units at 03/09/24 2211    [Held by provider] insulin lispro (HumaLOG) injection 4 Units, 4 Units, subcutaneous, Before meals & nightly, BRENDA Navarro    ipratropium-albuteroL (Duo-Neb) 0.5-2.5 mg/3 mL nebulizer solution 3 mL, 3 mL, nebulization, q2h PRN, BRENDA Navarro    melatonin tablet 3 mg, 3 mg, oral, Daily PRN, BRENDA Navarro    metoprolol succinate XL (Toprol-XL) 24 hr tablet 50 mg, 50 mg, oral, Daily, BRENDA Navarro, 50 mg at 03/10/24 0906    oxygen (O2) therapy, , inhalation, Continuous PRN - O2/gases, BRENDA Navarro, 2 L/min at 03/09/24 2000    pantoprazole (ProtoNix) injection 40 mg, 40 mg, intravenous, BID, BRENDA Hendrix, 40 mg at 03/10/24 0907    polyethylene glycol (Glycolax, Miralax) packet 17 g, 17 g, oral, Daily PRN, BRENDA Navarro    sodium bicarbonate tablet 650 mg, 650 mg, oral, BID, Dylon Marin MD, 650 mg at 03/10/24 0906    sodium chloride 0.9% flush 10 mL, 10 mL, intravenous, BID, BRENDA Navarro, 10 mL at 03/10/24 0913    thiamine (Vitamin B-1) tablet 100 mg, 100 mg, oral, Daily, BRENDA Navarro, 100 mg at 03/10/24 0906    PERTINENT ROS:  GENERAL:  positive for fatigue, poor appetite.  No fever/chills  RESPIRATORY:  positive for shortness of breath.  Negative for cough,  wheezing.  CARDIOVASCULAR:   Negative for chest pain or palpitation.  GI:  Negative for abdominal pain, diarrhea, heartburn, nausea, vomiting  : Indwelling Conner catheter in place    Physical Exam:  Vital signs in last 24 hours:  Temp:  [36 °C (96.8 °F)-37 °C (98.6 °F)] 36.8 °C (98.2 °F)  Heart Rate:  [67-72] 67  Resp:  [16-18] 16  BP: (120-157)/(55-84) 134/66    General: Awake, cooperative, not in acute distress, right central catheter in place with no signs of infection at exit site.  HEENT:  NCAT,  mucous membranes moist and pink  NECK:  Elevated JVD, no carotid bruit, supple, no cervical mass or thyromegaly  LUNGS;  Diminished breath sounds, fine Rales  CV:  Distant, regular rate and rhythm, + murmurs  ABDOMEN:  abdomen soft, nontender, BS normal, no masses or organomegaly  EDEMA:  trace lower extremity edema/dependent edema  SKIN: Right breast with dressing in place  : Indwelling Conner catheter in place    Intake/Output last 3 shifts:  I/O last 3 completed shifts:  In: 1451.7 (16.1 mL/kg) [P.O.:460; I.V.:991.7 (11 mL/kg)]  Out: 1150 (12.7 mL/kg) [Urine:1150 (0.4 mL/kg/hr)]  Weight: 90.4 kg     DATA:  Diagnotic tests reviewed for Todays visit:  Results from last 7 days   Lab Units 03/10/24  0651   WBC AUTO x10*3/uL 5.5   RBC AUTO x10*6/uL 2.60*   HEMOGLOBIN g/dL 7.7*   HEMATOCRIT % 26.0*     Results from last 7 days   Lab Units 03/10/24  0651 03/09/24  0555 03/08/24  1647 03/08/24  1647   SODIUM mmol/L 140 140  --  138   POTASSIUM mmol/L 4.7 5.0  --  4.7   CHLORIDE mmol/L 113* 112*  --  110*   CO2 mmol/L 18* 19*  --  21   BUN mg/dL 60* 65*  --  69*   CREATININE mg/dL 2.21* 2.32*  --  2.34*   CALCIUM mg/dL 8.2* 8.0*  --  8.3*   PHOSPHORUS mg/dL  --  5.5*  --   --    MAGNESIUM mg/dL 2.17 2.10   < >  --    BILIRUBIN TOTAL mg/dL  --   --   --  0.3   ALT U/L  --   --   --  3*   AST U/L  --   --   --  8*    < > = values in this interval not displayed.     Results from last 7 days   Lab Units 03/09/24  9361   COLOR  U  Yellow   APPEARANCE U  Hazy*   PH U  5.0   SPEC GRAV UR  1.029   PROTEIN U mg/dL 100 (2+)*   BLOOD UR  LARGE (3+)*   NITRITE U  NEGATIVE   WBC UR /HPF >50*     IMAGING: CXR reviewed in [facility] images      SIGNATURE: Dylon Marin MD  Nephrology and Hypertension  [address]  Office phone: [phone]  FAX: [phone]    This note was partially generated using the Dragon voice recognition system, and there may be some incorrect words, spelling's and punctuation that were not noted in checking the note before saving.

## 2024-03-10 NOTE — CONSULTS
Reason For Consult  GI bleed    History Of Present Illness  Vamsi Benson is a 78 y.o. male presenting with multiple significant medical comorbidities see past medical history.  Of note he was recently hospitalized to [facility] for cellulitis and subsequent GI bleed underwent upper endoscopy on March 4, 2024 which showed multiple normal bleeding ulcers and possible mild gastric outlet obstruction.  Patient is on chronic anticoagulation with Coumadin which was restarted at nursing home.  Patient was sent to the hospital because his baseline hemoglobin was slightly lower and he had a few episodes of melena.  Patient's hemoglobin was 7.3 on 4 March.  Prior to that few months ago his baseline hemoglobin is around 9.  Today in the morning activism globin was 7.0.  Patient denies any abdominal pain, nausea, vomiting.     CT abdomen and pelvis: Gastric wall thickening concerning for gastritis.  No acute active GI bleed noted.  Colonic diverticulosis noted.  Mild urinary bladder wall thickening.  Large bilateral pleural effusions and bibasilar atelectatic/consolidative opacities (2/29/2024: chest x-ray no pleural effusions or consolidation seen).      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), HTN (hypertension) (11/24/2009), Hyperlipidemia (09/13/2004), Hyponatremia, Late latent syphilis (02/26/2024), Malnutrition of moderate degree (CMS/[facility]) (08/17/2016), MGUS (monoclonal gammopathy of unknown significance) (02/27/2024), MI  (myocardial infarction) (CMS/Formerly [facility]), Normocytic anemia (03/01/2024), Obesity, unspecified (08/20/2016), Parkinson's disease (02/22/2024), Polyneuropathy (01/16/2023), Pseudophakia of both eyes (03/08/2024), PVC (premature ventricular contraction) (11/24/2009), Retinal hemorrhage, Retinal ischemia, TIA (transient ischemic attack), Vertigo, and Vitamin D deficiency.    Surgical History  He has a past surgical history that includes Coronary artery bypass graft (2016); Cataract extraction; Coronary stent placement; Dental surgery; Abscess drainage; Descemets stripping automated endothelial keratoplasty (Right); Hand surgery (Right, 02/23/2024); and Esophagogastroduodenoscopy (03/04/2024).     Social History  He reports that he has never smoked. He has never used smokeless tobacco. He reports that he does not drink alcohol and does not use drugs.    Family History  Family History   Problem Relation Name Age of Onset    Heart disease Mother      Heart disease Father          Allergies  Codeine, Erythromycin, Meperidine, Metformin, Penicillins, and Oxycodone    Review of Systems  Constitutional: NEGATIVE: Fever, Chills, Malaise, Weight Loss, Fatigue, decreased appetite      Eyes: NEGATIVE: Blurry Vision, Vision Loss/ Change, Redness, Drainage      ENMT: NEGATIVE: Nasal Discharge, Nasal Congestion, Throat Pain, Mouth Pain, Ear Pain      Respiratory: NEGATIVE: Dry Cough, Productive Cough, Shortness of Breath, Wheezing, Hemoptysis      Cardiac: NEGATIVE: Chest Pain, Dyspnea on Exertion, Palpitations, Orthopnea, Syncope       Gastrointestinal: Negative: Nausea, Vomiting, Diarrhea, Constipation, Abdominal Pain, melena stool      Genitourinary: NEGATIVE: Dysuria, Frequency, Hematuria, Flank Pain      Musculoskeletal: Negative: Decreased ROM, Pain, Weakness, Swelling      Neurological: NEGATIVE: Confusion, Dizziness, Headache, Syncope, Seizures         Physical Exam  Constitutional: Awake and alert, Calm, and Cooperative. Speech  "clear. No acute distress.  Skin: Pink, warm, and dry.  Right hand wound reddened, swollen, black tissue at the base of the thumb, scattered sutures from wrist to dorsal hand... See pictures in chart. Diffuse midl anasraca.   Eyes: PERRL, sclera clear, No swelling or drainage noted  ENMT: Mucous membranes pink and dry, no apparent injury, no lesions seen  Head/Neck: Neck Supple, no apparent injury, trachea midline, no palpable masses on head  Cardiac: regular rhythm and rate, auscultated S1 and S2, no murmurs  Lungs: Diminished in bilateral lower lobes, symmetrical chest rise, no accessory muscle usage, tachypnea noted, 2 L nasal cannula  Abdomen: Soft, non-tender, no rebound tenderness or guarding, nondistended, positive bowel sounds in all quadrants     Last Recorded Vitals  Blood pressure 151/84, pulse 67, temperature 36 °C (96.8 °F), resp. rate 18, height 1.88 m (6' 2\"), weight 91.4 kg (201 lb 8 oz), SpO2 98 %.    Relevant Results           Assessment/Plan     Melena in setting of known PUD and active AC.  Drop in H/H   Patient has multiorgan compromise at this time with active infectious process, acute renal failure .  Large pleural effusions.  I suspect melena with slight drop in hemoglobin is secondary to slow active bleeding from known gastric ulcers which were seen on upper endoscopy 5 days ago in the setting of Lovenox use.  At this time I would recommend stopping anticoagulation for 5 days.  Starting PPI oral twice daily.  Continue to monitor hemoglobin and hematocrit if it continues to trend downward then we will consider upper endoscopy.  Supportive care per primary team.  Will continue to follow      I spent 45 minutes in the professional and overall care of this patient.      Jorge A Carter MD    "

## 2024-03-10 NOTE — PROGRESS NOTES
"INPATIENT PROGRESS NOTES    PATIENT NAME: Vamsi Benson    MRN: [id number]  SERVICE DATE:  3/10/2024   SERVICE TIME:  11:15 AM    SIGNATURE: Aneudy Raymond MD      SUBJECTIVE  Afebrile  No events overnight       OBJECTIVE  PHYSICAL EXAM:   Patient Vitals for the past 24 hrs:   BP Temp Temp src Pulse Resp SpO2 Weight   03/10/24 0800 134/66 36.8 °C (98.2 °F) Temporal 67 16 99 % --   03/10/24 0600 -- -- -- -- -- -- 90.4 kg (199 lb 4.7 oz)   03/10/24 0400 135/55 36.7 °C (98.1 °F) Temporal 67 16 98 % --   03/10/24 0000 120/58 37 °C (98.6 °F) -- 70 16 98 % --   03/09/24 2000 157/81 36.4 °C (97.5 °F) -- 72 16 98 % --   03/09/24 1600 151/84 36 °C (96.8 °F) -- 67 18 98 % --   03/09/24 1200 (!) 169/36 36 °C (96.8 °F) -- 58 18 99 % --         Gen: NAD  Neck: symmetric, no mass  Cardiovascular: RRR  Respiratory: No distress   GI: Abd soft, nontender, non-distended  Extremities: no leg edema  Skin: Warm and dry.      Labs:  Lab Results   Component Value Date    WBC 5.5 03/10/2024    HGB 7.7 (L) 03/10/2024    HCT 26.0 (L) 03/10/2024     03/10/2024     03/10/2024     Lab Results   Component Value Date    GLUCOSE 108 (H) 03/10/2024    CALCIUM 8.2 (L) 03/10/2024     03/10/2024    K 4.7 03/10/2024    CO2 18 (L) 03/10/2024     (H) 03/10/2024    BUN 60 (H) 03/10/2024    CREATININE 2.21 (H) 03/10/2024   ESR: --No results found for: \"SEDRATE\"  Lab Results   Component Value Date    CRP 6.16 (H) 03/09/2024     Lab Results   Component Value Date    ALT 3 (L) 03/08/2024    AST 8 (L) 03/08/2024    ALKPHOS 52 03/08/2024    BILITOT 0.3 03/08/2024         DATA:   Diagnostic tests reviewed for today's visit:    Labs this admission reviewed  Imagings this admission reviewed  Cultures: Reviewed        ASSESSMENT :   -Recent right hand abscess with tenosynovitis status post I&D on 2/23 at [facility]  -Recent group A strep bacteremia  -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  Reported receiving 2 doses of IM penicillin " as scheduled for the third dose on 3/11  -Acute anemia  -History of A-fib, renal failure, hypertension     PLAN:  -Continue ceftriaxone 2 g IV every 24 hours through 3/27 and follow-up with ID at [facility]  -Tolerating antibiotics with no side effects    Aneudy Raymond MD.   Infectious Diseases Attending

## 2024-03-11 ENCOUNTER — APPOINTMENT (OUTPATIENT)
Dept: CARDIOLOGY | Facility: HOSPITAL | Age: 79
DRG: 811 | End: 2024-03-11
Payer: MEDICARE

## 2024-03-11 LAB
ABO GROUP (TYPE) IN BLOOD: NORMAL
ANION GAP SERPL CALC-SCNC: 10 MMOL/L (ref 10–20)
AORTIC VALVE MEAN GRADIENT: 2.9 MMHG
AORTIC VALVE PEAK VELOCITY: 1.19 M/S
AV PEAK GRADIENT: 5.7 MMHG
BACTERIA UR CULT: ABNORMAL
BLOOD EXPIRATION DATE: NORMAL
BUN SERPL-MCNC: 53 MG/DL (ref 6–23)
CALCIUM SERPL-MCNC: 8 MG/DL (ref 8.6–10.3)
CHLORIDE SERPL-SCNC: 113 MMOL/L (ref 98–107)
CO2 SERPL-SCNC: 21 MMOL/L (ref 21–32)
CREAT SERPL-MCNC: 2.13 MG/DL (ref 0.5–1.3)
DISPENSE STATUS: NORMAL
EGFRCR SERPLBLD CKD-EPI 2021: 31 ML/MIN/1.73M*2
ERYTHROCYTE [DISTWIDTH] IN BLOOD BY AUTOMATED COUNT: 15.8 % (ref 11.5–14.5)
GLUCOSE BLD MANUAL STRIP-MCNC: 159 MG/DL (ref 74–99)
GLUCOSE SERPL-MCNC: 146 MG/DL (ref 74–99)
HCT VFR BLD AUTO: 22 % (ref 41–52)
HCT VFR BLD AUTO: 25.1 % (ref 41–52)
HGB BLD-MCNC: 6.7 G/DL (ref 13.5–17.5)
HGB BLD-MCNC: 7.7 G/DL (ref 13.5–17.5)
LEFT ATRIUM VOLUME AREA LENGTH INDEX BSA: 31.1 ML/M2
LEFT VENTRICLE INTERNAL DIMENSION DIASTOLE: 5.33 CM (ref 3.5–6)
MAGNESIUM SERPL-MCNC: 2.02 MG/DL (ref 1.6–2.4)
MCH RBC QN AUTO: 29.4 PG (ref 26–34)
MCHC RBC AUTO-ENTMCNC: 30.5 G/DL (ref 32–36)
MCV RBC AUTO: 97 FL (ref 80–100)
NRBC BLD-RTO: 0 /100 WBCS (ref 0–0)
PLATELET # BLD AUTO: 198 X10*3/UL (ref 150–450)
POTASSIUM SERPL-SCNC: 4.3 MMOL/L (ref 3.5–5.3)
PRODUCT BLOOD TYPE: 5100
PRODUCT CODE: NORMAL
RBC # BLD AUTO: 2.28 X10*6/UL (ref 4.5–5.9)
RH FACTOR (ANTIGEN D): NORMAL
RIGHT VENTRICLE FREE WALL PEAK S': 11 CM/S
RIGHT VENTRICLE PEAK SYSTOLIC PRESSURE: 31.3 MMHG
SODIUM SERPL-SCNC: 140 MMOL/L (ref 136–145)
TRICUSPID ANNULAR PLANE SYSTOLIC EXCURSION: 1.2 CM
UNIT ABO: NORMAL
UNIT NUMBER: NORMAL
UNIT RH: NORMAL
UNIT VOLUME: 400
WBC # BLD AUTO: 5.3 X10*3/UL (ref 4.4–11.3)
XM INTEP: NORMAL

## 2024-03-11 PROCEDURE — 85027 COMPLETE CBC AUTOMATED: CPT | Performed by: NURSE PRACTITIONER

## 2024-03-11 PROCEDURE — 1200000002 HC GENERAL ROOM WITH TELEMETRY DAILY

## 2024-03-11 PROCEDURE — 2500000001 HC RX 250 WO HCPCS SELF ADMINISTERED DRUGS (ALT 637 FOR MEDICARE OP): Performed by: NURSE PRACTITIONER

## 2024-03-11 PROCEDURE — C9113 INJ PANTOPRAZOLE SODIUM, VIA: HCPCS | Performed by: NURSE PRACTITIONER

## 2024-03-11 PROCEDURE — 2500000004 HC RX 250 GENERAL PHARMACY W/ HCPCS (ALT 636 FOR OP/ED): Mod: JZ | Performed by: INTERNAL MEDICINE

## 2024-03-11 PROCEDURE — 99232 SBSQ HOSP IP/OBS MODERATE 35: CPT | Performed by: NURSE PRACTITIONER

## 2024-03-11 PROCEDURE — 85014 HEMATOCRIT: CPT | Performed by: NURSE PRACTITIONER

## 2024-03-11 PROCEDURE — 36415 COLL VENOUS BLD VENIPUNCTURE: CPT | Performed by: INTERNAL MEDICINE

## 2024-03-11 PROCEDURE — 2500000002 HC RX 250 W HCPCS SELF ADMINISTERED DRUGS (ALT 637 FOR MEDICARE OP, ALT 636 FOR OP/ED): Performed by: NURSE PRACTITIONER

## 2024-03-11 PROCEDURE — 37799 UNLISTED PX VASCULAR SURGERY: CPT | Performed by: NURSE PRACTITIONER

## 2024-03-11 PROCEDURE — 93306 TTE W/DOPPLER COMPLETE: CPT

## 2024-03-11 PROCEDURE — 82947 ASSAY GLUCOSE BLOOD QUANT: CPT

## 2024-03-11 PROCEDURE — 80048 BASIC METABOLIC PNL TOTAL CA: CPT | Performed by: NURSE PRACTITIONER

## 2024-03-11 PROCEDURE — 2500000005 HC RX 250 GENERAL PHARMACY W/O HCPCS: Performed by: NURSE PRACTITIONER

## 2024-03-11 PROCEDURE — 2500000004 HC RX 250 GENERAL PHARMACY W/ HCPCS (ALT 636 FOR OP/ED): Performed by: NURSE PRACTITIONER

## 2024-03-11 PROCEDURE — 36430 TRANSFUSION BLD/BLD COMPNT: CPT

## 2024-03-11 PROCEDURE — 2500000004 HC RX 250 GENERAL PHARMACY W/ HCPCS (ALT 636 FOR OP/ED): Performed by: INTERNAL MEDICINE

## 2024-03-11 PROCEDURE — 83735 ASSAY OF MAGNESIUM: CPT | Performed by: NURSE PRACTITIONER

## 2024-03-11 PROCEDURE — P9016 RBC LEUKOCYTES REDUCED: HCPCS

## 2024-03-11 RX ORDER — INSULIN LISPRO 100 [IU]/ML
0-5 INJECTION, SOLUTION INTRAVENOUS; SUBCUTANEOUS
Status: DISCONTINUED | OUTPATIENT
Start: 2024-03-11 | End: 2024-03-19 | Stop reason: HOSPADM

## 2024-03-11 RX ORDER — DEXTROSE 50 % IN WATER (D50W) INTRAVENOUS SYRINGE
25
Status: DISCONTINUED | OUTPATIENT
Start: 2024-03-11 | End: 2024-03-19 | Stop reason: HOSPADM

## 2024-03-11 RX ORDER — FUROSEMIDE 10 MG/ML
20 INJECTION INTRAMUSCULAR; INTRAVENOUS ONCE
Status: COMPLETED | OUTPATIENT
Start: 2024-03-11 | End: 2024-03-11

## 2024-03-11 RX ORDER — DEXTROSE MONOHYDRATE 100 MG/ML
0.3 INJECTION, SOLUTION INTRAVENOUS ONCE AS NEEDED
Status: DISCONTINUED | OUTPATIENT
Start: 2024-03-11 | End: 2024-03-19 | Stop reason: HOSPADM

## 2024-03-11 RX ADMIN — BRIMONIDINE TARTRATE 1 DROP: 2 SOLUTION OPHTHALMIC at 20:54

## 2024-03-11 RX ADMIN — Medication 3 L/MIN: at 17:00

## 2024-03-11 RX ADMIN — SODIUM BICARBONATE 650 MG: 650 TABLET ORAL at 20:53

## 2024-03-11 RX ADMIN — PANTOPRAZOLE SODIUM 40 MG: 40 INJECTION, POWDER, FOR SOLUTION INTRAVENOUS at 21:18

## 2024-03-11 RX ADMIN — GABAPENTIN 100 MG: 100 CAPSULE ORAL at 20:53

## 2024-03-11 RX ADMIN — PANTOPRAZOLE SODIUM 40 MG: 40 INJECTION, POWDER, FOR SOLUTION INTRAVENOUS at 11:26

## 2024-03-11 RX ADMIN — Medication 5 DROP: at 10:10

## 2024-03-11 RX ADMIN — CARBIDOPA AND LEVODOPA 1 TABLET: 25; 100 TABLET ORAL at 20:53

## 2024-03-11 RX ADMIN — Medication 10 ML: at 10:11

## 2024-03-11 RX ADMIN — PENICILLIN G BENZATHINE 2.4 MILLION UNITS: 1200000 INJECTION, SUSPENSION INTRAMUSCULAR at 20:51

## 2024-03-11 RX ADMIN — INSULIN LISPRO 1 UNITS: 100 INJECTION, SOLUTION INTRAVENOUS; SUBCUTANEOUS at 21:17

## 2024-03-11 RX ADMIN — METOPROLOL SUCCINATE 50 MG: 50 TABLET, EXTENDED RELEASE ORAL at 09:00

## 2024-03-11 RX ADMIN — FUROSEMIDE 20 MG: 10 INJECTION, SOLUTION INTRAMUSCULAR; INTRAVENOUS at 15:43

## 2024-03-11 RX ADMIN — THIAMINE HCL TAB 100 MG 100 MG: 100 TAB at 10:09

## 2024-03-11 RX ADMIN — Medication 4000 UNITS: at 10:09

## 2024-03-11 RX ADMIN — SODIUM BICARBONATE 650 MG: 650 TABLET ORAL at 10:09

## 2024-03-11 RX ADMIN — GABAPENTIN 100 MG: 100 CAPSULE ORAL at 10:09

## 2024-03-11 RX ADMIN — BRIMONIDINE TARTRATE 1 DROP: 2 SOLUTION OPHTHALMIC at 10:10

## 2024-03-11 RX ADMIN — ATORVASTATIN CALCIUM 80 MG: 80 TABLET, FILM COATED ORAL at 20:54

## 2024-03-11 RX ADMIN — Medication 10 ML: at 21:18

## 2024-03-11 RX ADMIN — Medication 5 DROP: at 20:54

## 2024-03-11 RX ADMIN — CARBIDOPA AND LEVODOPA 1 TABLET: 25; 100 TABLET ORAL at 10:10

## 2024-03-11 ASSESSMENT — COGNITIVE AND FUNCTIONAL STATUS - GENERAL
PERSONAL GROOMING: A LITTLE
WALKING IN HOSPITAL ROOM: TOTAL
MOVING FROM LYING ON BACK TO SITTING ON SIDE OF FLAT BED WITH BEDRAILS: A LOT
TOILETING: A LOT
DRESSING REGULAR UPPER BODY CLOTHING: A LOT
DRESSING REGULAR LOWER BODY CLOTHING: A LOT
CLIMB 3 TO 5 STEPS WITH RAILING: TOTAL
DRESSING REGULAR UPPER BODY CLOTHING: A LOT
MOVING FROM LYING ON BACK TO SITTING ON SIDE OF FLAT BED WITH BEDRAILS: A LOT
MOVING TO AND FROM BED TO CHAIR: A LOT
MOBILITY SCORE: 9
EATING MEALS: A LITTLE
HELP NEEDED FOR BATHING: A LOT
DRESSING REGULAR LOWER BODY CLOTHING: A LOT
MOVING TO AND FROM BED TO CHAIR: A LOT
DAILY ACTIVITIY SCORE: 14
DAILY ACTIVITIY SCORE: 14
TURNING FROM BACK TO SIDE WHILE IN FLAT BAD: A LOT
CLIMB 3 TO 5 STEPS WITH RAILING: TOTAL
STANDING UP FROM CHAIR USING ARMS: TOTAL
TURNING FROM BACK TO SIDE WHILE IN FLAT BAD: A LOT
WALKING IN HOSPITAL ROOM: TOTAL
PERSONAL GROOMING: A LITTLE
MOBILITY SCORE: 9
TOILETING: A LOT
HELP NEEDED FOR BATHING: A LOT
EATING MEALS: A LITTLE
STANDING UP FROM CHAIR USING ARMS: TOTAL

## 2024-03-11 ASSESSMENT — PAIN SCALES - GENERAL
PAINLEVEL_OUTOF10: 0 - NO PAIN
PAINLEVEL_OUTOF10: 0 - NO PAIN

## 2024-03-11 NOTE — NURSING NOTE
Patient's son states first choice for facility is [facility]. Case management out of office for the day. Left message.

## 2024-03-11 NOTE — PROGRESS NOTES
03/11/24 1739   Discharge Planning   Living Arrangements Alone   Support Systems Children   Type of Residence Private residence   Home or Post Acute Services Post acute facilities (Rehab/SNF/etc)   Type of Post Acute Facility Services Skilled nursing   Patient expects to be discharged to: snf   Does the patient need discharge transport arranged? Yes   RoundTrip coordination needed? Yes   Has discharge transport been arranged? No   Patient Choice   Provider Choice list and CMS website (https://medicare.gov/care-compare#search) for post-acute Quality and Resource Measure Data were provided and reviewed with: Family;Patient     Met with the pt and his son and discussed snf and reviewed the list.  They chose [facility]. Sent the referral.

## 2024-03-11 NOTE — PROGRESS NOTES
"INPATIENT PROGRESS NOTES    PATIENT NAME: Vamsi Benson    MRN: [id number]  SERVICE DATE:  3/11/2024   SERVICE TIME:  12:47 PM    SIGNATURE: Aneudy Raymond MD      SUBJECTIVE  Afebrile  No events overnight       OBJECTIVE  PHYSICAL EXAM:   Patient Vitals for the past 24 hrs:   BP Temp Temp src Pulse Resp SpO2 Weight   03/11/24 1230 (!) 160/100 36.6 °C (97.9 °F) Tympanic 60 22 99 % --   03/11/24 1145 152/74 36.1 °C (97 °F) Temporal 64 18 -- --   03/11/24 1135 152/68 36.3 °C (97.3 °F) Temporal 65 20 -- --   03/11/24 1130 169/81 36.1 °C (97 °F) Temporal 70 20 99 % --   03/11/24 1125 148/65 36.6 °C (97.9 °F) Temporal 72 18 -- --   03/11/24 0800 144/75 36.4 °C (97.5 °F) Temporal 58 14 96 % --   03/11/24 0400 130/69 36.6 °C (97.9 °F) Temporal 65 16 98 % 90.9 kg (200 lb 4.8 oz)   03/11/24 0130 155/77 37.1 °C (98.8 °F) Temporal 68 16 98 % --   03/10/24 2000 156/78 36.8 °C (98.2 °F) Temporal 75 14 98 % --   03/10/24 1600 162/70 36.7 °C (98.1 °F) Temporal 70 15 97 % --         Gen: NAD  Neck: symmetric, no mass  Cardiovascular: RRR  Respiratory: No distress   GI: Abd soft, nontender, non-distended  Extremities: no leg edema  Skin: Warm and dry.      Labs:  Lab Results   Component Value Date    WBC 5.3 03/11/2024    HGB 6.7 (L) 03/11/2024    HCT 22.0 (L) 03/11/2024    MCV 97 03/11/2024     03/11/2024     Lab Results   Component Value Date    GLUCOSE 146 (H) 03/11/2024    CALCIUM 8.0 (L) 03/11/2024     03/11/2024    K 4.3 03/11/2024    CO2 21 03/11/2024     (H) 03/11/2024    BUN 53 (H) 03/11/2024    CREATININE 2.13 (H) 03/11/2024   ESR: --No results found for: \"SEDRATE\"  Lab Results   Component Value Date    CRP 6.16 (H) 03/09/2024     Lab Results   Component Value Date    ALT 3 (L) 03/08/2024    AST 8 (L) 03/08/2024    ALKPHOS 52 03/08/2024    BILITOT 0.3 03/08/2024         DATA:   Diagnostic tests reviewed for today's visit:    Labs this admission reviewed  Imagings this admission reviewed  Cultures: " Reviewed        ASSESSMENT :   -Recent right hand abscess with tenosynovitis status post I&D on 2/23 at [facility]  -Recent group A strep bacteremia  -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  Reported receiving 2 doses of IM penicillin as scheduled for the third dose on 3/11  -Acute anemia  -History of A-fib, renal failure, hypertension     PLAN:  -Continue ceftriaxone 2 g IV every 24 hours through 3/27 and follow-up with ID at [facility]  -Tolerating antibiotics with no side effects  -Will give a dose of IM penicillin to complete the course of latent syphilis  -Repeat RPR in 3 to 6 months  -Positive urine culture for Candida albicans without symptoms likely colonization    The plan was discussed with the pharmacy team    Aneudy Raymond MD.   Infectious Diseases Attending

## 2024-03-11 NOTE — PROGRESS NOTES
INPATIENT NEPHROLOGY CONSULT PROGRESS NOTE      Patient Name: Vamsi Benson MRN: [id number]  DATE of SERVICE: March 11, 2024  TIME of SERVICE: 10:41 AM  CONSULTING SERVICE: Nephrology    REASON for CONSULT: FRANCIE    SUBJECTIVE:  Seen and evaluated at bedside. remains on clear liquid.  Continues to endorse weakness.  Ongoing GI bleed scheduled to receive 1 unit of packed RBCs today.  Requiring 2 L of oxygen.  To give 20 mg of IV Lasix after blood transfusion.  Blood pressure stable 140/70 with a heart rate of 58.  Renal parameters plateauing serum creatinine 2.1 from 2.3.  BUN 53 from 60.  eGFR 31.32.  Hemoglobin dropped to 6.7 from 7.7..    SUMMARY OF STAY:  Mr. Benson is a 78-year-old male who presented to [facility] March 8, 2024 for evaluation of GI bleed complicated with acute kidney injury.  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  Recent evaluation of right upper extremity swelling was negative for DVT.  History of GI bleed status post EGD March 4, 2024 which demonstrated 3 nonbleeding ulcers in swelling of gastric outlet.  Patient presented to [facility] from [facility] for evaluation of acute blood loss anemia with drop in hemoglobin to 7 mg/dL associated with melena for 2 to 3 days prior to admission in addition to poor oral intake for 4 days.  Patient was found to be on Lovenox 100 mg subcu daily and Coumadin 2.5 mg  p.o. daily at the facility. Labs demonstrated no acute kidney injury with a creatinine up to 2.3 mg deciliter from baseline creatinine of 1.2 mg deciliter, BUN up to 65, metabolic acidosis bicarb of 19, hyperchloremia with a chloride of 112, hyperkalemia with potassium of 5, anemia with hemoglobin 7 mg deciliter.    ASSESSMENT:  Acute kidney injury superimposed on chronic kidney disease stage II:  Acute kidney injury likely hemodynamically mediated which likely evolved into ATN the setting of acute blood loss anemia, acute GI bleed, recent to group a strep bacteremia requiring IV ceftriaxone.  -- Serum creatinine 2.3 mg deciliter with a EGFR of 28 mill per minute per 1.73 m2  -- Chronic urinary retention: Conner catheter in place with dark concentrated urine.   -- Urine culture in process     Volume: Euvolemic however with tendency to third space, hypoalbuminemia with albumin of 2  bilateral pleural effusion.   --Plan of care discussed with Dr. Weldon, tentative plan to target net even fluid balance for now.      Hyperkalemia: Due to FRANCIE, cellular shift in the setting of acidosis  Improved     Metabolic acidosis: Bicarb of 19  Oral sodium bicarbonates 650 mg p.o. 3 times daily     Acute blood loss anemia: Hemoglobin 7 mg/dL  --Acute GI bleed  -- Blood transfusion for hemoglobin less than 7     Concern for urinary tract infection:  Chronic indwelling Conner catheter in place.  Urine culture in process.      Recent right hand abscess status post I&D February 23.  Receiving IV ceftriaxone infectious disease following, appreciate recommendations.   Recent group A streptococcus bacteremia.  Latent syphilis     Atrial fibrillation managed with anticoagulation and metoprolol     Associated condition: Parkinsonism, vascular dementia, diabetes     Plan:  Acute kidney injury in the setting of acute blood loss anemia, recent bacteremia, prolonged antibiotic use, suspected urinary tract infection:     -- Ongoing GI bleed, scheduled  to receive 1 unit of packed RBCs  -- To add 20 mg of IV Lasix after blood transfusion  -- Acute hypoxia requiring 2 L of oxygen, oxygen requirement stable compared to yesterday  -- Hypoalbuminemia, malnutrition will add protein supplements  -- Bilateral pleural effusion   -- Oral sodium bicarbonates 650 mg p.o. 3 times daily.   --Indwelling Conner catheter for chronic urinary retention  -- Medication reviewed, renally dosed to continue to adjust medication based on GFR.      Will follow, thank you!    Plan of care discussed with patient and bedside nurse.     Medications:    Current Facility-Administered Medications:     acetaminophen (Tylenol) tablet 650 mg, 650 mg, oral, q4h PRN, BRENDA Navarro    atorvastatin (Lipitor) tablet 80 mg, 80 mg, oral, Nightly, BRENDA Navarro, 80 mg at 03/10/24 2116    brimonidine (AlphaGAN) 0.2 % ophthalmic solution 1 drop, 1 drop, Right Eye, BID, BRENDA Navarro, 1 drop at 03/11/24 1010    carbamide peroxide (Debrox) 6.5 % otic solution 5 drop, 5 drop, Left Ear, BID, Dorina BRAD Hatch, CARLO-CNP, 5 drop at 03/11/24 1010    carbidopa-levodopa (Sinemet)  mg per tablet 1 tablet, 1 tablet, oral, BID, BRENDA Navarro, 1 tablet at 03/11/24 1010    cefTRIAXone (Rocephin) 2 g IV in dextrose 5% 50 mL, 2 g, intravenous, q24h, BRENDA Navarro, Stopped at 03/11/24 0029    cholecalciferol (Vitamin D-3) tablet 4,000 Units, 4,000 Units, oral, Daily, BRENDA Navarro, 4,000 Units at 03/11/24 1009    dextrose 50 % injection 25 g, 25 g, intravenous, q15 min PRN, BRENDA Navarro    gabapentin (Neurontin) capsule 100 mg, 100 mg, oral, BID, CARLO Navarro-CNP, 100 mg at 03/11/24 1009    [Held by provider] glipiZIDE (Glucotrol) tablet 2.5 mg, 2.5 mg, oral, BID AC, BRENDA Navarro    glucagon (Glucagen) injection 1 mg, 1 mg, intramuscular, q15 min PRNCandy APRN-CNP    [Held by provider] insulin lispro (HumaLOG)  injection 4 Units, 4 Units, subcutaneous, Before meals & nightly, BRENDA Navarro    ipratropium-albuteroL (Duo-Neb) 0.5-2.5 mg/3 mL nebulizer solution 3 mL, 3 mL, nebulization, q2h PRN, BRENDA Navarro    melatonin tablet 3 mg, 3 mg, oral, Daily PRN, BRENDA Navarro    metoprolol succinate XL (Toprol-XL) 24 hr tablet 50 mg, 50 mg, oral, Daily, BRENDA Navarro, 50 mg at 03/11/24 0900    oxygen (O2) therapy, , inhalation, Continuous PRN - O2/gases, BRENDA Navarro, 2 L/min at 03/09/24 2000    pantoprazole (ProtoNix) injection 40 mg, 40 mg, intravenous, BID, Rosalina Cheung, CARLO-CNP, 40 mg at 03/10/24 2116    polyethylene glycol (Glycolax, Miralax) packet 17 g, 17 g, oral, Daily PRN, BRENDA Navarro    sodium bicarbonate tablet 650 mg, 650 mg, oral, BID, Dylon Marin MD, 650 mg at 03/11/24 1009    sodium chloride 0.9% flush 10 mL, 10 mL, intravenous, BID, BRENDA Naavrro, 10 mL at 03/11/24 1011    thiamine (Vitamin B-1) tablet 100 mg, 100 mg, oral, Daily, CARLO Navarro-CNP, 100 mg at 03/11/24 1009    PERTINENT ROS:  GENERAL:  positive for fatigue, poor appetite.  No fever/chills  RESPIRATORY:  positive for shortness of breath.  Negative for cough, wheezing.  CARDIOVASCULAR:   Negative for chest pain or palpitation.  GI:  Negative for abdominal pain, diarrhea, heartburn, nausea, vomiting  : Indwelling Conner catheter in place    Physical Exam:  Vital signs in last 24 hours:  Temp:  [36.4 °C (97.5 °F)-37.1 °C (98.8 °F)] 36.4 °C (97.5 °F)  Heart Rate:  [58-75] 58  Resp:  [14-16] 14  BP: (130-162)/(67-78) 144/75    General: Awake, cooperative, not in acute distress, right central catheter in place with no signs of infection at exit site.  HEENT:  NCAT,  mucous membranes moist and pink  NECK:  Elevated JVD, no carotid bruit, supple, no cervical mass or thyromegaly  LUNGS;  Diminished breath sounds, fine Rales  CV:  Distant, regular rate and  rhythm, + murmurs  ABDOMEN:  abdomen soft, nontender, ongoing GI bleed  EDEMA:  trace lower extremity edema/dependent edema  SKIN: Right breast with dressing in place  : Indwelling Conner catheter in place    Intake/Output last 3 shifts:  I/O last 3 completed shifts:  In: 870 (9.6 mL/kg) [P.O.:820; IV Piggyback:50]  Out: 1850 (20.4 mL/kg) [Urine:1850 (0.6 mL/kg/hr)]  Weight: 90.9 kg     DATA:  Diagnotic tests reviewed for Todays visit:  Results from last 7 days   Lab Units 03/11/24  0338   WBC AUTO x10*3/uL 5.3   RBC AUTO x10*6/uL 2.28*   HEMOGLOBIN g/dL 6.7*   HEMATOCRIT % 22.0*       Results from last 7 days   Lab Units 03/11/24  0338 03/10/24  0651 03/09/24  0555 03/08/24  1647   SODIUM mmol/L 140   < > 140 138   POTASSIUM mmol/L 4.3   < > 5.0 4.7   CHLORIDE mmol/L 113*   < > 112* 110*   CO2 mmol/L 21   < > 19* 21   BUN mg/dL 53*   < > 65* 69*   CREATININE mg/dL 2.13*   < > 2.32* 2.34*   CALCIUM mg/dL 8.0*   < > 8.0* 8.3*   PHOSPHORUS mg/dL  --   --  5.5*  --    MAGNESIUM mg/dL 2.02   < > 2.10  --    BILIRUBIN TOTAL mg/dL  --   --   --  0.3   ALT U/L  --   --   --  3*   AST U/L  --   --   --  8*    < > = values in this interval not displayed.       Results from last 7 days   Lab Units 03/09/24  0421   COLOR U  Yellow   APPEARANCE U  Hazy*   PH U  5.0   SPEC GRAV UR  1.029   PROTEIN U mg/dL 100 (2+)*   BLOOD UR  LARGE (3+)*   NITRITE U  NEGATIVE   WBC UR /HPF >50*       IMAGING: CXR reviewed in  images      SIGNATURE: Dylon Marin MD  Nephrology and Hypertension  [address]  Office phone: [phone]  FAX: [phone]    This note was partially generated using the Dragon voice recognition system, and there may be some incorrect words, spelling's and punctuation that were not noted in checking the note before saving.

## 2024-03-11 NOTE — PROGRESS NOTES
"Subjective  Patient sitting up in bed in NAD.  No complaints of chest pain or abdominal pain or cramping.  Per nursing patient has had 1 small black formed stool since arrival.  He is receiving 1 unit PRBCs today for hemoglobin 6.7 from 7.7 yesterday.  BP with systolics in the 150s- 160's.  EGD 1 week ago did note duodenal ulcer, esophageal ulcers.  Plan to continue to manage conservatively, treat ulcers per protocol and to monitor daily hemoglobin.    Objective  Blood pressure 152/74, pulse 64, temperature 36.1 °C (97 °F), temperature source Temporal, resp. rate 18, height 1.88 m (6' 2\"), weight 90.9 kg (200 lb 4.8 oz), SpO2 99 %.    Physical Exam  Constitutional: Lethargic but pleasant and interactive, in NAD  Eyes: PERRL, sclera clear, no conjunctival injection  Skin: Warm and dry, no rash or ecchymosis  ENMT: Mucous membranes moist, no lesions noted  Resp: CTAB, even and unlabored  CV: RRR, normal S1, S2, no m,r,g  GI: +BS, soft, round, NT, no rebound tenderness or guarding, no palpable masses or organomegaly  MSK: 5/5 strength, ROM intact, no joint swelling  Extremities: Extremities warm, no edema, contusions, wounds or cyanosis  Neuro: Alert and oriented x3  Psych: Appropriate mood and behavior    Medications  Scheduled medications  atorvastatin, 80 mg, oral, Nightly  brimonidine, 1 drop, Right Eye, BID  carbamide peroxide, 5 drop, Left Ear, BID  carbidopa-levodopa, 1 tablet, oral, BID  cefTRIAXone, 2 g, intravenous, q24h  cholecalciferol, 4,000 Units, oral, Daily  furosemide, 20 mg, intravenous, Once  gabapentin, 100 mg, oral, BID  [Held by provider] glipiZIDE, 2.5 mg, oral, BID AC  insulin lispro, 0-5 Units, subcutaneous, Before meals & nightly  [Held by provider] insulin lispro, 4 Units, subcutaneous, Before meals & nightly  metoprolol succinate XL, 50 mg, oral, Daily  pantoprazole, 40 mg, intravenous, BID  sodium bicarbonate, 650 mg, oral, BID  sodium chloride 0.9%, 10 mL, intravenous, BID  thiamine, 100 " "mg, oral, Daily      Continuous medications     PRN medications  PRN medications: acetaminophen, dextrose 10 % in water (D10W), dextrose, glucagon, ipratropium-albuteroL, melatonin, oxygen, polyethylene glycol     Labs  Lab Results   Component Value Date    WBC 5.3 03/11/2024    HGB 6.7 (L) 03/11/2024    HCT 22.0 (L) 03/11/2024    MCV 97 03/11/2024     03/11/2024     Lab Results   Component Value Date    GLUCOSE 146 (H) 03/11/2024    CALCIUM 8.0 (L) 03/11/2024     03/11/2024    K 4.3 03/11/2024    CO2 21 03/11/2024     (H) 03/11/2024    BUN 53 (H) 03/11/2024    CREATININE 2.13 (H) 03/11/2024     Lab Results   Component Value Date    ALT 3 (L) 03/08/2024    AST 8 (L) 03/08/2024    ALKPHOS 52 03/08/2024    BILITOT 0.3 03/08/2024     No results found for: \"IRON\", \"TIBC\", \"FERRITIN\"  Lab Results   Component Value Date    INR 1.3 (H) 03/08/2024    PROTIME 15.0 (H) 03/08/2024       Radiology  CTA A/P with and without IV contrast 3/8/2024 noting:  Impression:     Evaluation is limited secondary to underdistention, there is however  evidence of gastric wall thickening concerning for gastritis,  follow-up upper endoscopy is recommended for further evaluation and  to exclude other underlying pathology.      No evidence of separation contrast bowel lumen to suggest acute  active gastrointestinal bleed.      Colonic diverticulosis without evidence of acute diverticulitis.      Underdistention versus mild urinary bladder wall thickening; please  correlate urinalysis to exclude cystitis.      Partially imaged large bilateral pleural effusions and bibasilar  atelectatic/consolidative opacities.      Signed by: John Rodriguez 3/8/2024 8:11 PM  Dictation workstation:   [id number]       Assessment  Vamsi Benson is a 78 y.o. male presenting from [facility] with anemia. Hgb has been 7-8 this month. Pt had EGD at [facility] 3/4/2024 (hemoglobin 7.1 at that time) noting duodenitis, duodenal ulcer and esophageal ulcers with no " active bleeding. Of note, baseline looks to be 11-12 earlier in the year.  Patient with 2 small formed black stools in 3 days.  Receiving transfusion for hemoglobin 6.7 this morning.     # melena  # esophageal and duodenal ulcers  # blood loss anemia  # a fib on coumadin  # FRANCIE    Plan:  - continue supportive care  - clear liquids diet as tolerated  - continue to trend hgb daily unless pt becomes symptomatic or decompensates  - transfuse to maintain hgb >7  - continue to monitor for overt signs of bleeding  - no NSAIDS  - H pylori pending from 3/4 EGD  - continue PPI BID  - will continue to manage conservatively unless hgb remains unstable post transfusion    Plan has been discussed with Dr. Beatty. GI will continue to follow.     CARLO Sanchez/CNP

## 2024-03-11 NOTE — NURSING NOTE
0700- Hemoglobin less than 7.0 on AM labs. Katerin paged.    0800- Off floor to echo.     0845- Patient back to room. Dr Abbott at bedside. Call placed to lab for STAT blood draw.    1000- Nephrology an GI at bedside and aware of hemoglobin. CHG bath given. Linens changed.    1040- Call received unit of blood ready.    1130- Unit 1 of 1 PRBC transfusing at this time.     1400- Patient's son at bedside. Asking about long term prognosis and goals of care. Case management and katerin notified.

## 2024-03-12 LAB
ANION GAP SERPL CALC-SCNC: 9 MMOL/L (ref 10–20)
ATRIAL RATE: 100 BPM
BUN SERPL-MCNC: 47 MG/DL (ref 6–23)
CALCIUM SERPL-MCNC: 8.3 MG/DL (ref 8.6–10.3)
CHLORIDE SERPL-SCNC: 110 MMOL/L (ref 98–107)
CO2 SERPL-SCNC: 23 MMOL/L (ref 21–32)
CREAT SERPL-MCNC: 2.2 MG/DL (ref 0.5–1.3)
EGFRCR SERPLBLD CKD-EPI 2021: 30 ML/MIN/1.73M*2
ERYTHROCYTE [DISTWIDTH] IN BLOOD BY AUTOMATED COUNT: 15.9 % (ref 11.5–14.5)
GLUCOSE BLD MANUAL STRIP-MCNC: 117 MG/DL (ref 74–99)
GLUCOSE BLD MANUAL STRIP-MCNC: 127 MG/DL (ref 74–99)
GLUCOSE BLD MANUAL STRIP-MCNC: 136 MG/DL (ref 74–99)
GLUCOSE SERPL-MCNC: 129 MG/DL (ref 74–99)
HCT VFR BLD AUTO: 25.9 % (ref 41–52)
HGB BLD-MCNC: 8.1 G/DL (ref 13.5–17.5)
MAGNESIUM SERPL-MCNC: 2.02 MG/DL (ref 1.6–2.4)
MCH RBC QN AUTO: 29.6 PG (ref 26–34)
MCHC RBC AUTO-ENTMCNC: 31.3 G/DL (ref 32–36)
MCV RBC AUTO: 95 FL (ref 80–100)
NRBC BLD-RTO: 0 /100 WBCS (ref 0–0)
P AXIS: 5 DEGREES
P OFFSET: 181 MS
P ONSET: 148 MS
PLATELET # BLD AUTO: 205 X10*3/UL (ref 150–450)
POTASSIUM SERPL-SCNC: 4.3 MMOL/L (ref 3.5–5.3)
PR INTERVAL: 130 MS
Q ONSET: 213 MS
QRS COUNT: 12 BEATS
QRS DURATION: 90 MS
QT INTERVAL: 384 MS
QTC CALCULATION(BAZETT): 434 MS
QTC FREDERICIA: 417 MS
R AXIS: 25 DEGREES
RBC # BLD AUTO: 2.74 X10*6/UL (ref 4.5–5.9)
SODIUM SERPL-SCNC: 138 MMOL/L (ref 136–145)
T AXIS: 87 DEGREES
T OFFSET: 405 MS
VENTRICULAR RATE: 77 BPM
WBC # BLD AUTO: 6.2 X10*3/UL (ref 4.4–11.3)

## 2024-03-12 PROCEDURE — 2500000004 HC RX 250 GENERAL PHARMACY W/ HCPCS (ALT 636 FOR OP/ED): Performed by: NURSE PRACTITIONER

## 2024-03-12 PROCEDURE — 97161 PT EVAL LOW COMPLEX 20 MIN: CPT | Mod: GP

## 2024-03-12 PROCEDURE — 2500000001 HC RX 250 WO HCPCS SELF ADMINISTERED DRUGS (ALT 637 FOR MEDICARE OP): Performed by: INTERNAL MEDICINE

## 2024-03-12 PROCEDURE — 2500000004 HC RX 250 GENERAL PHARMACY W/ HCPCS (ALT 636 FOR OP/ED): Performed by: INTERNAL MEDICINE

## 2024-03-12 PROCEDURE — 82947 ASSAY GLUCOSE BLOOD QUANT: CPT

## 2024-03-12 PROCEDURE — 1200000002 HC GENERAL ROOM WITH TELEMETRY DAILY

## 2024-03-12 PROCEDURE — 83735 ASSAY OF MAGNESIUM: CPT | Performed by: NURSE PRACTITIONER

## 2024-03-12 PROCEDURE — 85027 COMPLETE CBC AUTOMATED: CPT | Performed by: NURSE PRACTITIONER

## 2024-03-12 PROCEDURE — C9113 INJ PANTOPRAZOLE SODIUM, VIA: HCPCS | Performed by: NURSE PRACTITIONER

## 2024-03-12 PROCEDURE — 99232 SBSQ HOSP IP/OBS MODERATE 35: CPT | Performed by: NURSE PRACTITIONER

## 2024-03-12 PROCEDURE — 97165 OT EVAL LOW COMPLEX 30 MIN: CPT | Mod: GO

## 2024-03-12 PROCEDURE — 2500000001 HC RX 250 WO HCPCS SELF ADMINISTERED DRUGS (ALT 637 FOR MEDICARE OP): Performed by: NURSE PRACTITIONER

## 2024-03-12 PROCEDURE — 80048 BASIC METABOLIC PNL TOTAL CA: CPT | Performed by: NURSE PRACTITIONER

## 2024-03-12 RX ORDER — FUROSEMIDE 20 MG/1
20 TABLET ORAL DAILY
Status: DISCONTINUED | OUTPATIENT
Start: 2024-03-12 | End: 2024-03-14

## 2024-03-12 RX ADMIN — BRIMONIDINE TARTRATE 1 DROP: 2 SOLUTION OPHTHALMIC at 09:21

## 2024-03-12 RX ADMIN — Medication 10 ML: at 09:22

## 2024-03-12 RX ADMIN — SODIUM BICARBONATE 650 MG: 650 TABLET ORAL at 09:21

## 2024-03-12 RX ADMIN — PANTOPRAZOLE SODIUM 40 MG: 40 INJECTION, POWDER, FOR SOLUTION INTRAVENOUS at 21:14

## 2024-03-12 RX ADMIN — Medication 5 DROP: at 09:21

## 2024-03-12 RX ADMIN — CEFTRIAXONE SODIUM 2 G: 2 INJECTION, SOLUTION INTRAVENOUS at 21:14

## 2024-03-12 RX ADMIN — FUROSEMIDE 20 MG: 20 TABLET ORAL at 09:20

## 2024-03-12 RX ADMIN — METOPROLOL SUCCINATE 50 MG: 50 TABLET, EXTENDED RELEASE ORAL at 09:21

## 2024-03-12 RX ADMIN — THIAMINE HCL TAB 100 MG 100 MG: 100 TAB at 09:21

## 2024-03-12 RX ADMIN — ATORVASTATIN CALCIUM 80 MG: 80 TABLET, FILM COATED ORAL at 21:14

## 2024-03-12 RX ADMIN — GABAPENTIN 100 MG: 100 CAPSULE ORAL at 09:20

## 2024-03-12 RX ADMIN — PANTOPRAZOLE SODIUM 40 MG: 40 INJECTION, POWDER, FOR SOLUTION INTRAVENOUS at 10:33

## 2024-03-12 RX ADMIN — Medication 5 DROP: at 21:14

## 2024-03-12 RX ADMIN — Medication 4000 UNITS: at 09:20

## 2024-03-12 RX ADMIN — CARBIDOPA AND LEVODOPA 1 TABLET: 25; 100 TABLET ORAL at 09:21

## 2024-03-12 RX ADMIN — BRIMONIDINE TARTRATE 1 DROP: 2 SOLUTION OPHTHALMIC at 21:14

## 2024-03-12 RX ADMIN — GABAPENTIN 100 MG: 100 CAPSULE ORAL at 21:14

## 2024-03-12 RX ADMIN — CEFTRIAXONE SODIUM 2 G: 2 INJECTION, SOLUTION INTRAVENOUS at 00:47

## 2024-03-12 RX ADMIN — CARBIDOPA AND LEVODOPA 1 TABLET: 25; 100 TABLET ORAL at 21:14

## 2024-03-12 RX ADMIN — SODIUM BICARBONATE 650 MG: 650 TABLET ORAL at 21:13

## 2024-03-12 ASSESSMENT — PAIN SCALES - GENERAL
PAINLEVEL_OUTOF10: 0 - NO PAIN
PAINLEVEL_OUTOF10: 0 - NO PAIN
PAINLEVEL_OUTOF10: 8
PAINLEVEL_OUTOF10: 0 - NO PAIN

## 2024-03-12 ASSESSMENT — COGNITIVE AND FUNCTIONAL STATUS - GENERAL
DRESSING REGULAR UPPER BODY CLOTHING: A LOT
MOVING FROM LYING ON BACK TO SITTING ON SIDE OF FLAT BED WITH BEDRAILS: TOTAL
MOBILITY SCORE: 9
WALKING IN HOSPITAL ROOM: TOTAL
MOBILITY SCORE: 6
STANDING UP FROM CHAIR USING ARMS: TOTAL
TOILETING: A LOT
HELP NEEDED FOR BATHING: A LOT
MOVING TO AND FROM BED TO CHAIR: TOTAL
STANDING UP FROM CHAIR USING ARMS: TOTAL
PERSONAL GROOMING: A LITTLE
TOILETING: TOTAL
DRESSING REGULAR LOWER BODY CLOTHING: A LOT
WALKING IN HOSPITAL ROOM: TOTAL
MOVING FROM LYING ON BACK TO SITTING ON SIDE OF FLAT BED WITH BEDRAILS: A LOT
EATING MEALS: A LITTLE
TURNING FROM BACK TO SIDE WHILE IN FLAT BAD: TOTAL
MOVING TO AND FROM BED TO CHAIR: A LOT
PERSONAL GROOMING: A LOT
DRESSING REGULAR LOWER BODY CLOTHING: TOTAL
DAILY ACTIVITIY SCORE: 14
DRESSING REGULAR UPPER BODY CLOTHING: A LOT
TURNING FROM BACK TO SIDE WHILE IN FLAT BAD: A LOT
HELP NEEDED FOR BATHING: TOTAL
EATING MEALS: A LOT
CLIMB 3 TO 5 STEPS WITH RAILING: TOTAL
CLIMB 3 TO 5 STEPS WITH RAILING: TOTAL
DAILY ACTIVITIY SCORE: 9

## 2024-03-12 ASSESSMENT — PAIN - FUNCTIONAL ASSESSMENT
PAIN_FUNCTIONAL_ASSESSMENT: 0-10

## 2024-03-12 ASSESSMENT — ACTIVITIES OF DAILY LIVING (ADL): BATHING_ASSISTANCE: TOTAL

## 2024-03-12 NOTE — PROGRESS NOTES
Physical Therapy    Physical Therapy    Physical Therapy Evaluation & Treatment    Patient Name: Vamsi Benson  MRN: [id number]  Today's Date: 3/12/2024   Time Calculation  Start Time: 1514  Stop Time: 1536  Time Calculation (min): 22 min    Assessment/Plan   PT Assessment  PT Assessment Results: Decreased strength, Decreased endurance, Impaired balance, Decreased mobility, Decreased cognition, Decreased safety awareness  Rehab Prognosis: Fair  End of Session Communication: Bedside nurse  Assessment Comment: Pt requires 24 hour hands on assist for bed mobility, transfers and gait.  Pt requires a heavy 2 person assist for bed mobility.  Pt refused transfers today.  Pt requires encouragement to participate and cues for safety and technique.  Pt is easily fatigued.  Pt would benefit from continued, moderate intensity PT to improve independence with all functional mobility.  End of Session Patient Position: Bed, 3 rail up, Alarm on  IP OR SWING BED PT PLAN  Inpatient or Swing Bed: Inpatient  PT Plan  Treatment/Interventions: Bed mobility, Transfer training, Gait training, Balance training, Strengthening, Endurance training, Therapeutic exercise, Therapeutic activity (Pt education)  PT Plan: Skilled PT  PT Frequency: 3 times per week  PT Discharge Recommendations: Moderate intensity level of continued care  PT - OK to Discharge:  OK to discharge from acute PT services to the next level of care when cleared by the medical team.    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  Per EMR: 78 y.o. male presenting to [facility] on [facility]/2024 with pertinent medical history of A-fib on Coumadin, HTN, HLD, CAD & MI s/p CABG x5 (2016) and PTCA, IDDM II, Parkinson's, GERD, embolic CVA, late latent syphilis, and sepsis  r/t right hand cellulitis with extensor tenosynovitis c/b BSI mediated by GAS, MRSA s/p surgical debridement on 2/23/2024 at [facility] on IV Rocephin 2 g every 24 hours via central line in right chest-stop date 3/27/2024, RUE swelling s/p US negative DVT on 3/1/2024, 2/21/2024 blood cultures positive for group A strep bacteremia tx with Vancomycin initially, late latent syphilis treated with IM Bicillin weekly x 3 weeks (3/4/2024-second dose), GI bleed s/p EGD (3/4/2024) showing 3 nonbleeding ulcer and swelling of gastric outlet who presents to the ED from [facility] with complaints of H/H of 7.0 and 22.3, weakness, fatigue, melena stools for 2 to 3 days, and decreased appetite for 4 to 5 days.  Patient found to be on Lovenox 100 mg subcu every 24 hours and Coumadin 2.5 mg p.o. daily at the .  Denies any recent fever/chills, headache, dizziness, chest pain / palpitations, shortness of breath, cough, abdominal pain, N/V, dysuria, or hematuria.     Of note, patient hospitalized at [facility] on 2/21/2024 for right hand swelling and pain.  Patient diagnosed with sepsis r/t right hand cellulitis with extensor tenosynovitis c/b BSI mediated by GAS, MRSA s/p surgical debridement on 2/23/2024 at [facility] on IV  Rocephin 2 g every 24 hours-stop date 3/27/2024.  On 2/21/2024 blood cultures positive for group A strep bacteremia tx with Vancomycin initially.  Patient had history of untreated late latent syphilis, therefore ID started treatment with IM Bicillin weekly x 3 weeks (3/4/2024-second dose).  Then the patient started coughing up blood followed by diarrhea with melena and coffee-ground emesis.  RR was called and patient's hemoglobin dropped 3 points in 1 day.  Patient had EGD which showed 3 nonbleeding ulcer and swelling of the gastric outlet which was biopsied.  Patient transferred to SNF on Lovenox 100 mg every 24 hours and Coumadin 2.5 mg p.o. daily.  Patient had Conner maintained at discharge due to FRANCIE with minimal urine output and retention.     In the ED, vital signs with temp 37.4, HR 72, RR 20, /68, SpO2 95% on room air.  CBC with RBC 2.46, hemoglobin 7.2, hematocrit 23.3 (H/H--approximately 7/21 since 2/29/2024).  INR 1.3.  CMP with chloride 110, BUN 69, creatinine 2.34, GFR 28 (baseline creatinine 1 per nephrology's note on 2/29/2024).  CT abdomen and pelvis: Gastric wall thickening concerning for gastritis.  No acute active GI bleed noted.  Colonic diverticulosis noted.  Mild urinary bladder wall thickening.  Large bilateral pleural effusions and bibasilar atelectatic/consolidative opacities (2/29/2024: chest x-ray no pleural effusions or consolidation seen).  While in the ED patient received Protonix 40 mg IV and normal saline x 1 L.  Patient admitted inpatient with GI hemorrhage with melena with consult to GI.  Referred By: Dr. Osman  Co-Treatment: OT  Co-Treatment Reason: Co-treatment to maximize functional independence and for safety.  Prior to Session Communication: Bedside nurse  Patient Position Received: Bed, 3 rail up, Alarm on    Home Living:  Home Living  Home Living Comments: Pt's son at bedside assisted with providing PLOF.  Pt was admitted from [facility].  Prior to recent  hospitalizations pt resided home alone, 2 stairs to enter, first floor bedroom and bathroom available.  Pt had been going upstairs to the second floor bedroom to sleep because his furnace was not working and it is warmer upstairs.    Prior Level of Function:  Prior Function Per Pt/Caregiver Report  Prior Function Comments: Pt reports he had PT once at SNF and he sat up at the edge of the bed with a 2 person assist.  Prior to hospitalizations pt was independent with gait, ADLs and IADLs.    Precautions:  Precautions  Medical Precautions: Fall precautions    Vital Signs:     Objective     Pain:  Pain Assessment  Pain Assessment: 0-10  Pain Score: 8 (in R hand when it is touched, no pain at rest.)    Cognition:  Cognition  Overall Cognitive Status: Impaired  Following Commands:  (poor command following.)  Safety/Judgement:  (impaired)    General Assessments:      Activity Tolerance  Endurance: Decreased tolerance for upright activites     Strength  Strength Comments: CASEY LE's grossly 3+/5     Coordination  Coordination Comment: Intermittent full body tremors while seated at the edge of the bed.     Static Sitting Balance  Static Sitting-Level of Assistance:  (Variable level of assist, SBA to max assist)  Dynamic Sitting Balance  Dynamic Sitting-Comments: min to max assist.       Functional Assessments:     Bed Mobility  Bed Mobility:  (supine<>sit max assist of 2, supine scoot dependent assist of 2, attempted seated scoot with max assist of 2.)  Transfers  Transfer:  (Pt refused transfers)             Extremity/Trunk Assessments:        RLE   RLE :  (R LE ROM WFL)  LLE   LLE :  (L LE ROM WFL)    Treatments:           Bed Mobility  Bed Mobility:  (supine<>sit max assist of 2, supine scoot dependent assist of 2, attempted seated scoot with max assist of 2.)     Transfers  Transfer:  (Pt refused transfers)       Outcome Measures:  [facility] Basic Mobility  Turning from your back to your side while in a flat bed without using  bedrails: Total  Moving from lying on your back to sitting on the side of a flat bed without using bedrails: Total  Moving to and from bed to chair (including a wheelchair): Total  Standing up from a chair using your arms (e.g. wheelchair or bedside chair): Total  To walk in hospital room: Total  Climbing 3-5 steps with railing: Total  Basic Mobility - Total Score: 6                            Goals:  Encounter Problems       Encounter Problems (Active)       PT Problem       PT Goal 1 (Progressing)       Start:  03/12/24    Expected End:  03/26/24       Pt able to perform bed mobility with mod assist of 2.           PT Goal 2 (Progressing)       Start:  03/12/24    Expected End:  03/26/24       Pt able to complete all transfers with mod assist of 2.            PT Goal 3 (Progressing)       Start:  03/12/24    Expected End:  03/26/24       Pt able to ambulate 10 feet with LRAD and mod assist of 2.                Education Documentation  Precautions, taught by Rosanne Boone, PT at 3/12/2024  3:55 PM.  Learner: Patient  Readiness: Acceptance  Method: Explanation  Response: Needs Reinforcement    Mobility Training, taught by Rosanne Boone, PT at 3/12/2024  3:55 PM.  Learner: Patient  Readiness: Acceptance  Method: Explanation  Response: Needs Reinforcement    Education Comments  No comments found.

## 2024-03-12 NOTE — NURSING NOTE
0830- Dr. Osman at bedside. Ordered to change central line dressing.     1200- NPO discontinued and diet ordered.     1500- Patient worked with PT/OT.    1800- Patient continues to be uncooperative with hand wound care at this time. Refusing ramirez catheter exchange again today. Educated patient. Continuing refusal.

## 2024-03-12 NOTE — CONSULTS
Reason For Consult  Goals of care discussion at the request of son    History Of Present Illness  Vamsi Benson is a 78 y.o. male presenting from [facility] with complaints of H/H of 7.0 and 22.3, weakness, fatigue, melena stools for 2 to 3 days, and decreased appetite for 4 to 5 days.  Patient found to be on Lovenox 100 mg subcu every 24 hours and Coumadin 2.5 mg p.o. daily at the SNF.      Past Medical History  He has a past medical history of Atrial fibrillation (CMS/[facility]) (08/20/2016), Bell's palsy (12/04/2014), CAD (coronary artery disease) (11/24/2009), Cellulitis (02/22/2024), CVA (cerebral vascular accident) (CMS/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) (08/17/2016), MGUS (monoclonal gammopathy of unknown significance) (02/27/2024), MI (myocardial infarction) (CMS/HCC), Normocytic anemia (03/01/2024), Obesity, unspecified (08/20/2016), Parkinson's disease (02/22/2024), Polyneuropathy (01/16/2023), Pseudophakia of both eyes (03/08/2024), PVC (premature ventricular contraction) (11/24/2009), Retinal hemorrhage, Retinal ischemia, TIA (transient ischemic attack), Vertigo, and Vitamin D deficiency.       Assessment/Plan     Rounded on pt this morning, pt care taking place so did not meet with pt directly. Phone call placed to pts son Vamsi, introduced palliative care. Vamsi expressed his concerns regarding pts health and overall understanding of his conditions. Pts son is worried that the pt is not taking SNF seriously and does  not understand that he may need LTC and son states he does not  have the funds to pay for ongoing care if he does not work to get better at the SNF. Son would like for this writer to have a diana discussion with the pt to understand his goals more clearly for himself, whether that be to take SNF seriously and try to get home v go with hospice care. Bedside Rn states pt has capacity to make decisions.  Plan to meet with pt tomorrow morning 1:1 to discuss goals of care per sons request and then will update son with plan of care.    Will follow        Jenna Welch RN

## 2024-03-12 NOTE — CARE PLAN
The patient's goals for the shift include  remaining comfortable throughout night.     The clinical goals for the shift include See POC      Problem: Psychosocial Needs  Goal: Demonstrates ability to cope with hospitalization/illness  Outcome: Progressing  Goal: Collaborate with me, my family, and caregiver to identify my specific goals  Outcome: Progressing     Problem: Discharge Barriers  Goal: My discharge needs are met  Outcome: Progressing     Problem: Skin  Goal: Promote/optimize nutrition  Outcome: Progressing  Goal: Decreased wound size/increased tissue granulation at next dressing change  Outcome: Progressing  Goal: Participates in plan/prevention/treatment measures  Outcome: Progressing  Goal: Prevent/manage excess moisture  Outcome: Progressing  Flowsheets (Taken 3/12/2024 0153)  Prevent/manage excess moisture: Cleanse incontinence/protect with barrier cream  Goal: Prevent/minimize sheer/friction injuries  Outcome: Progressing  Goal: Promote skin healing  Outcome: Progressing

## 2024-03-12 NOTE — PROGRESS NOTES
"Subjective  Patient sitting up in bed in NAD.  Hemoglobin up continuing to uptrend today to 8.1.  No overt signs of bleeding. Plan to continue PPI twice daily.     objective  Blood pressure (!) 150/101, pulse 58, temperature 36.5 °C (97.7 °F), temperature source Temporal, resp. rate 14, height 1.88 m (6' 2\"), weight 88.6 kg (195 lb 5.2 oz), SpO2 100 %.    Physical Exam  Constitutional: alert, pleasant and interactive, in NAD  Eyes: PERRL, sclera clear, no conjunctival injection  Skin: Warm and dry, no rash or ecchymosis  ENMT: Mucous membranes moist, no lesions noted  Resp: CTAB, even and unlabored  CV: rhythm irregular, normal S1, S2, no m,r,g  GI: +BS, soft, round, NT, no rebound tenderness or guarding, no palpable masses or organomegaly  MSK: 5/5 strength, ROM intact, no joint swelling  Extremities: Extremities warm, no edema, contusions, wounds or cyanosis  Neuro: Alert and oriented x3  Psych: Appropriate mood and behavior    Medications  Scheduled medications  atorvastatin, 80 mg, oral, Nightly  brimonidine, 1 drop, Right Eye, BID  carbamide peroxide, 5 drop, Left Ear, BID  carbidopa-levodopa, 1 tablet, oral, BID  cefTRIAXone, 2 g, intravenous, q24h  cholecalciferol, 4,000 Units, oral, Daily  furosemide, 20 mg, oral, Daily  gabapentin, 100 mg, oral, BID  [Held by provider] glipiZIDE, 2.5 mg, oral, BID AC  insulin lispro, 0-5 Units, subcutaneous, Before meals & nightly  [Held by provider] insulin lispro, 4 Units, subcutaneous, Before meals & nightly  metoprolol succinate XL, 50 mg, oral, Daily  pantoprazole, 40 mg, intravenous, BID  sodium bicarbonate, 650 mg, oral, BID  sodium chloride 0.9%, 10 mL, intravenous, BID  thiamine, 100 mg, oral, Daily      Continuous medications     PRN medications  PRN medications: acetaminophen, dextrose 10 % in water (D10W), dextrose, glucagon, ipratropium-albuteroL, melatonin, oxygen, polyethylene glycol     Labs  Lab Results   Component Value Date    WBC 6.2 03/12/2024    HGB " "8.1 (L) 03/12/2024    HCT 25.9 (L) 03/12/2024    MCV 95 03/12/2024     03/12/2024     Lab Results   Component Value Date    GLUCOSE 129 (H) 03/12/2024    CALCIUM 8.3 (L) 03/12/2024     03/12/2024    K 4.3 03/12/2024    CO2 23 03/12/2024     (H) 03/12/2024    BUN 47 (H) 03/12/2024    CREATININE 2.20 (H) 03/12/2024     Lab Results   Component Value Date    ALT 3 (L) 03/08/2024    AST 8 (L) 03/08/2024    ALKPHOS 52 03/08/2024    BILITOT 0.3 03/08/2024     No results found for: \"IRON\", \"TIBC\", \"FERRITIN\"  Lab Results   Component Value Date    INR 1.3 (H) 03/08/2024    PROTIME 15.0 (H) 03/08/2024       Radiology  CTA A/P with and without IV contrast 3/8/2024 noting:  Impression:     Evaluation is limited secondary to underdistention, there is however  evidence of gastric wall thickening concerning for gastritis,  follow-up upper endoscopy is recommended for further evaluation and  to exclude other underlying pathology.      No evidence of separation contrast bowel lumen to suggest acute  active gastrointestinal bleed.      Colonic diverticulosis without evidence of acute diverticulitis.      Underdistention versus mild urinary bladder wall thickening; please  correlate urinalysis to exclude cystitis.      Partially imaged large bilateral pleural effusions and bibasilar  atelectatic/consolidative opacities.      Signed by: John Rodriguez 3/8/2024 8:11 PM  Dictation workstation:   [id number]       Assessment  Vamsi Benson is a 78 y.o. male presenting from SNF with anemia. Hgb has been 7-8 this month. Pt had EGD at [facility] 3/4/2024 (hemoglobin 7.1 at that time) noting duodenitis, duodenal ulcer and esophageal ulcers with no active bleeding. Of note, baseline looks to be 11-12 earlier in the year.   Received 1U PRBC's for hemoglobin 6.7 yesterday with hgb 8.1 this am.     # melena- improving  # esophageal and duodenal ulcers  # blood loss anemia- stable  # a fib on coumadin  # FRANCIE    Plan:  - continue " supportive care  - diabetic diet as tolerated  - continue to trend hgb daily   - transfuse to maintain hgb >7  - continue to monitor for overt signs of bleeding  - no NSAIDS  - H pylori pending from 3/4 EGD  - ok to continue PPI BID  - will continue to manage conservatively unless significant drop in hgb     Plan has been discussed with Dr. Beatty. GI will sign off.     Shy Cyr, APRN/CNP

## 2024-03-12 NOTE — PROGRESS NOTES
Internal Medicine Progress Note    Patient Name: Vamsi Benson          MRN: [id number]  Today's Date: March 12, 2024          Attending: Rufino Osman MD    Subjective:  Patient was seen and examined at bedside.    Review Of Systems:  CARDIOVASCULAR: Negative for chest pain  RESPIRATORY: Negative for shortness of breath  GI: No nausea, vomiting, or diarrhea    Objective:  Vitals:    03/12/24 0400 03/12/24 0600 03/12/24 0800 03/12/24 1200   BP: 144/76  147/75 (!) 150/101   BP Location: Left arm  Left arm Left arm   Patient Position: Lying  Lying Lying   Pulse: 73  68 58   Resp: 16  14 14   Temp: 36.7 °C (98.1 °F)  36.6 °C (97.9 °F) 36.5 °C (97.7 °F)   TempSrc: Temporal  Temporal Temporal   SpO2: 98%  98% 100%   Weight:  88.6 kg (195 lb 5.2 oz)     Height:               Physical Exam:   General appearance: Alert, in no acute distress  Lungs: Diminished breath sounds  Heart: RRR without murmur, gallop, or rubs.  Abdomen: Soft, non-tender. Bowel sounds normal.  Extremities: Right hand is wrapped with dressing  Neuro: Alert oriented x3, no focal deficit.    Labs:  Results for orders placed or performed during the hospital encounter of 03/08/24 (from the past 24 hour(s))   POCT GLUCOSE   Result Value Ref Range    POCT Glucose 159 (H) 74 - 99 mg/dL   Hemoglobin and Hematocrit   Result Value Ref Range    Hemoglobin 7.7 (L) 13.5 - 17.5 g/dL    Hematocrit 25.1 (L) 41.0 - 52.0 %   Magnesium   Result Value Ref Range    Magnesium 2.02 1.60 - 2.40 mg/dL   CBC   Result Value Ref Range    WBC 6.2 4.4 - 11.3 x10*3/uL    nRBC 0.0 0.0 - 0.0 /100 WBCs    RBC 2.74 (L) 4.50 - 5.90 x10*6/uL    Hemoglobin 8.1 (L) 13.5 - 17.5 g/dL    Hematocrit 25.9 (L) 41.0 - 52.0 %    MCV 95 80 - 100 fL    MCH 29.6 26.0 - 34.0 pg    MCHC 31.3 (L) 32.0 - 36.0 g/dL    RDW 15.9 (H) 11.5 - 14.5 %    Platelets 205 150 - 450 x10*3/uL   Basic metabolic panel   Result Value Ref Range    Glucose 129 (H) 74 - 99 mg/dL    Sodium 138 136 - 145 mmol/L    Potassium 4.3  3.5 - 5.3 mmol/L    Chloride 110 (H) 98 - 107 mmol/L    Bicarbonate 23 21 - 32 mmol/L    Anion Gap 9 (L) 10 - 20 mmol/L    Urea Nitrogen 47 (H) 6 - 23 mg/dL    Creatinine 2.20 (H) 0.50 - 1.30 mg/dL    eGFR 30 (L) >60 mL/min/1.73m*2    Calcium 8.3 (L) 8.6 - 10.3 mg/dL   POCT GLUCOSE   Result Value Ref Range    POCT Glucose 117 (H) 74 - 99 mg/dL   POCT GLUCOSE   Result Value Ref Range    POCT Glucose 127 (H) 74 - 99 mg/dL       Medications:  Scheduled medications  atorvastatin, 80 mg, oral, Nightly  brimonidine, 1 drop, Right Eye, BID  carbamide peroxide, 5 drop, Left Ear, BID  carbidopa-levodopa, 1 tablet, oral, BID  cefTRIAXone, 2 g, intravenous, q24h  cholecalciferol, 4,000 Units, oral, Daily  furosemide, 20 mg, oral, Daily  gabapentin, 100 mg, oral, BID  [Held by provider] glipiZIDE, 2.5 mg, oral, BID AC  insulin lispro, 0-5 Units, subcutaneous, Before meals & nightly  [Held by provider] insulin lispro, 4 Units, subcutaneous, Before meals & nightly  metoprolol succinate XL, 50 mg, oral, Daily  pantoprazole, 40 mg, intravenous, BID  sodium bicarbonate, 650 mg, oral, BID  sodium chloride 0.9%, 10 mL, intravenous, BID  thiamine, 100 mg, oral, Daily      Continuous medications     PRN medications  PRN medications: acetaminophen, dextrose 10 % in water (D10W), dextrose, glucagon, ipratropium-albuteroL, melatonin, oxygen, polyethylene glycol      Assessment/Plan:  Principal Problem:    Gastrointestinal hemorrhage with melena  Continue PPI p.o. 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 In such instances, original meaning may be extrapolated by contextual derivation  "

## 2024-03-12 NOTE — NURSING NOTE
Pt had uneventful night with no acute changes in condition. Pt is in bed with call light in reach.

## 2024-03-12 NOTE — PROGRESS NOTES
Occupational Therapy    Evaluation    Patient Name: Vamsi Benson  MRN: [id number]  Today's Date: 3/12/2024  Time Calculation  Start Time: 1516  Stop Time: 1539  Time Calculation (min): 23 min    Assessment  IP OT Assessment  End of Session Communication: Bedside nurse  End of Session Patient Position: Bed, 3 rail up, Alarm on (son present, RUE elevated, all needs in reach)  Plan:  Treatment Interventions: ADL retraining, Functional transfer training, Endurance training, Cognitive reorientation, Patient/family training, Equipment evaluation/education, Neuromuscular reeducation, Compensatory technique education  OT Frequency: 3 times per week  OT Discharge Recommendations: Moderate intensity level of continued care  OT - OK to Discharge: Yes (when medically appropriate)    Subjective   Current Problem:  1. Gastrointestinal hemorrhage with melena        2. Subtherapeutic international normalized ratio (INR)        3. Normocytic normochromic anemia        4. Bilateral pleural effusion        5. Extensor tenosynovitis of right wrist  Vascular US upper extremity venous duplex right    Vascular US upper extremity venous duplex right      6. Swelling of extremity, right  Vascular US upper extremity venous duplex right    Vascular US upper extremity venous duplex right      7. Uremic acidosis        8. Pleural effusion, bilateral  Transthoracic Echo (TTE) Complete    Transthoracic Echo (TTE) Complete      9. FRANCIE (acute kidney injury) (CMS/HCC)  Transthoracic Echo (TTE) Complete    Transthoracic Echo (TTE) Complete      10. Coronary artery disease, unspecified vessel or lesion type, unspecified whether angina present, unspecified whether native or transplanted heart  Transthoracic Echo (TTE) Complete    Transthoracic Echo (TTE) Complete      11. Atrial fibrillation, unspecified type (CMS/HCC)  Transthoracic Echo (TTE) Complete    Transthoracic Echo (TTE) Complete        General:  General  Reason for Referral: ADL  Referred  By: Magdy  Past Medical History Relevant to Rehab: Includes: A-fib on Coumadin, HTN, HLD, CAD & MI s/p CABG x5 (2016) and PTCA, IDDM II, Parkinson's, GERD, embolic CVA, late latent syphilis, and sepsis  r/t right hand cellulitis with extensor tenosynovitis c/b BSI mediated by GAS, MRSA s/p surgical debridement on 2/23/2024 at [facility] on IV Rocephin 2 g every 24 hours via central line in right chest-stop date 3/27/2024, RUE swelling s/p US negative DVT on 3/1/2024, 2/21/2024 blood cultures positive for group A strep bacteremia tx with Vancomycin initially, late latent syphilis treated with IM Bicillin weekly x 3 weeks (3/4/2024-second dose), GI bleed s/p EGD (3/4/2024) showing 3 nonbleeding ulcer and swelling of gastric outlet.     Past Medical History:   Diagnosis Date    Atrial fibrillation (CMS/[facility]) 08/20/2016    Bell's palsy 12/04/2014    CAD (coronary artery disease) 11/24/2009    Formatting of this note might be different from the original. History: ALISIA to RCA in 2007. Assessment: 8/9/16 CABG x 5 Plan:   Aspirin: Yes and Plavix Beta blockers: Yes Statins: Yes    Cellulitis 02/22/2024    CVA (cerebral vascular accident) (CMS/[facility])     Embolic    Dysphagia, oropharyngeal phase 08/20/2016    Erectile dysfunction 01/24/2017    Extensor tenosynovitis of right wrist 02/21/2024    GERD (gastroesophageal reflux disease)     Glaucoma 11/24/2009    Herpes zoster ophthalmicus of right eye 03/08/2024    Herpes zoster uveitis 03/08/2024    HTN (hypertension) 11/24/2009    Hyperlipidemia 09/13/2004    Hyponatremia     Late latent syphilis 02/26/2024    Malnutrition of moderate degree (CMS/[facility]) 08/17/2016    MGUS (monoclonal gammopathy of unknown significance) 02/27/2024    MI (myocardial infarction) (CMS/[facility])     Normocytic anemia 03/01/2024    Obesity, unspecified 08/20/2016    Parkinson's disease 02/22/2024    Polyneuropathy 01/16/2023    Pseudophakia of both eyes 03/08/2024    PVC (premature ventricular contraction) 11/24/2009  "   Retinal hemorrhage     Retinal ischemia     TIA (transient ischemic attack)     Vertigo     Vitamin D deficiency      Past Surgical History:   Procedure Laterality Date    ABSCESS DRAINAGE      Renal    CATARACT EXTRACTION      CORONARY ARTERY BYPASS GRAFT  2016    x5 vessels    CORONARY STENT PLACEMENT      x2    DENTAL SURGERY      DESCEMETS STRIPPING AUTOMATED ENDOTHELIAL KERATOPLASTY Right     ESOPHAGOGASTRODUODENOSCOPY  03/04/2024    3 nonbleeding ulcers and swelling of the gastric outlet    HAND SURGERY Right 02/23/2024    Right hand incision and drainage down to and including tenosynovium, excisional         Co-Treatment: PT  Prior to Session Communication: Bedside nurse  Patient Position Received: Bed, 3 rail up, Alarm on (son present)  General Comment: Admitted with GIB with melena, FRANCIE, metabolic acidosis, acute anemia, duodenal and esophageal ulcers, bilat. pleural effusions, extensor tenosynovitis R wrist, wound R hand.  RUE UE (-) DVT.  Precautions:  Medical Precautions: Fall precautions  Splinting: R hand is dressed with gauze and ace wrap  Precautions Comment: OT sent the following message through Epic for clarification:  \"Hi Dr. Cornell,  Is Vamsi Benson allowed any weight bearing on or ROM to his R hand if tolerated?  He currently has no active movement of his digits.  Will you please enter orders for weight bearing and ROM?  Thank you!\"  Vital Signs:     Pain:  Pain Assessment  Pain Assessment: 0-10  Pain Score: 0 - No pain  Pain Location:  (Pt. states that his R hand pain is 8/10 if it gets touched, but no pain this session.)    Objective   Cognition:  Overall Cognitive Status: Impaired           Home Living:  Home Living Comments: Pt's son at bedside assisted with providing PLOF. Pt was admitted from [facility]. Prior to recent hospitalizations pt resided home alone, 2 stairs to enter, first floor bedroom and bathroom available. Pt had been going upstairs to the second floor " bedroom to sleep because his furnace was not working and it is warmer upstairs.   Prior Function:  Prior Function Comments: Pt reports he had PT once at SNF and he sat up at the edge of the bed with a 2 person assist. Prior to hospitalizations pt was independent with gait, ADLs and IADLs.  IADL History:     ADL:  Eating Assistance: Moderate  Grooming Assistance: Maximal  Bathing Assistance: Total  UE Dressing Assistance: Maximal  LE Dressing Assistance: Total  Toileting Assistance with Device: Total  Activity Tolerance:  Endurance: Decreased tolerance for upright activites  Bed Mobility/Transfers: Bed Mobility  Bed Mobility:  (supine<>sit max assist of 2, supine scoot dependent assist of 2, attempted seated scoot with max assist of 2.)    Transfers  Transfer:  (Pt. declined transfer attempt)      Functional Mobility:     Sitting Balance:  Static Sitting Balance  Static Sitting-Comment/Number of Minutes: Fair to poor  Dynamic Sitting Balance  Dynamic Sitting-Comments: Fair to poor         Strength:  Strength Comments: Generalized weakness and muscle atrophy noted  Perception:         Hand Function:  Hand Function  Gross Grasp:  (RUE impaired, pt. is unable to move R digits.)  Coordination:  (RUE impaired, pt. is unable to move R digits.)  Extremities: RUE   RUE :  (Pt. is R handed.  R shoulder active flexion ~ 45 degrees, less than 20 degrees elbow flexion, no AROM R wrist/hand.) and LUE   LUE:  (L shoulder AROM ~ 60 degrees flexion, distal LUE AROM grossly WFL.  3 to 3+/5 LUE.)      Outcome Measures: [facility] Daily Activity  Putting on and taking off regular lower body clothing: Total  Bathing (including washing, rinsing, drying): Total  Putting on and taking off regular upper body clothing: A lot  Toileting, which includes using toilet, bedpan or urinal: Total  Taking care of personal grooming such as brushing teeth: A lot  Eating Meals: A lot  Daily Activity - Total Score: 9      Education Documentation  ADL  Training, taught by Inez Biswas, OT at 3/12/2024  6:22 PM.  Learner: Patient  Readiness: Acceptance  Method: Explanation  Response: Needs Reinforcement    Education Comments  No comments found.      Goals:   Encounter Problems       Encounter Problems (Active)       OT Goals       Max assist bed mobility.       Start:  03/12/24    Expected End:  03/26/24            Max assist sit/stand for ADL.        Start:  03/12/24    Expected End:  03/26/24            Mod assist UB dressing.        Start:  03/12/24    Expected End:  03/26/24            Fair dynamic sitting balance for ADL.        Start:  03/12/24    Expected End:  03/26/24

## 2024-03-12 NOTE — PROGRESS NOTES
INPATIENT NEPHROLOGY CONSULT PROGRESS NOTE      Patient Name: Vamsi Benson MRN: [id number]  DATE of SERVICE: March 12, 2024  TIME of SERVICE: 8:50 AM  CONSULTING SERVICE: Nephrology    REASON for CONSULT: FRANCIE    SUBJECTIVE:  Seen and evaluated at bedside.   Continues to endorse weakness and poor oral intake.   Requiring 2 L of oxygen.  Blood pressure stable  Renal parameters plateauing serum creatinine around 2.2 mg/dl  BUN 53 from 60.  eGFR 31 ml/min    SUMMARY OF STAY:  Mr. Benson is a 78-year-old male who presented to [facility] March 8, 2024 for evaluation of GI bleed complicated with acute kidney injury.  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  Recent evaluation of right upper extremity swelling was negative for DVT.  History of GI bleed status post EGD March 4, 2024 which demonstrated 3 nonbleeding ulcers in swelling of gastric outlet.  Patient presented to [facility] from [facility] for evaluation of acute blood loss anemia with drop in hemoglobin to 7 mg/dL associated with melena for 2 to 3 days prior to admission in addition to poor oral intake for 4 days.  Patient was found to be on Lovenox 100 mg subcu daily and Coumadin 2.5 mg p.o. daily at the facility. Labs demonstrated no acute kidney injury with a creatinine up to 2.3 mg deciliter from baseline creatinine of 1.2 mg deciliter, BUN up to 65, metabolic  acidosis bicarb of 19, hyperchloremia with a chloride of 112, hyperkalemia with potassium of 5, anemia with hemoglobin 7 mg deciliter.    ASSESSMENT:  Acute kidney injury superimposed on chronic kidney disease stage II:  Acute kidney injury likely hemodynamically mediated which likely evolved into ATN the setting of acute blood loss anemia, acute GI bleed, recent to group a strep bacteremia requiring IV ceftriaxone.  -- Serum creatinine 2.3 mg deciliter with a EGFR of 28 mill per minute per 1.73 m2  -- Chronic urinary retention: Ramirez catheter in place with dark concentrated urine.   -- Urine culture   Urine Culture >100,000 Candida glabrata           Volume: Euvolemic however with tendency to third space, hypoalbuminemia with albumin of 2  bilateral pleural effusion.   --Plan of care discussed with Dr. Weldon, tentative plan to target net even fluid balance for now.      Hyperkalemia: Due to FRANCIE, cellular shift in the setting of acidosis  Improved     Metabolic acidosis: Bicarb of 19  Oral sodium bicarbonates 650 mg p.o. 3 times daily     Acute blood loss anemia: Hemoglobin 7 mg/dL  --Acute GI bleed  -- Blood transfusion for hemoglobin less than 7     Concern for urinary tract infection:  Chronic indwelling Ramirez catheter in place.  Urine culture in process.      Recent right hand abscess status post I&D February 23.  Receiving IV ceftriaxone infectious disease following, appreciate recommendations.   Recent group A streptococcus bacteremia.  Latent syphilis     Atrial fibrillation managed with anticoagulation and metoprolol     Associated condition: Parkinsonism, vascular dementia, diabetes     Plan:  Acute kidney injury in the setting of acute blood loss anemia, recent bacteremia, prolonged antibiotic use, suspected urinary tract infection:     -- more stable to start lasix 20 mg po daily, to minimize third spacing   -- urine cx candida, to change ramirez catheter   --GI bleed s/p 1 unit of packed RBCs  -- Acute  hypoxia requiring 2 L of oxygen, oxygen requirement stable.   -- Hypoalbuminemia, malnutrition will cont protein supplements  -- Bilateral pleural effusion   -- Oral sodium bicarbonates 650 mg p.o. 3 times daily.   -- Medication reviewed, renally dosed to continue to adjust medication based on GFR.      Will follow, thank you!    Plan of care discussed with patient and bedside nurse.     Medications:    Current Facility-Administered Medications:     acetaminophen (Tylenol) tablet 650 mg, 650 mg, oral, q4h PRN, BRENDA Navarro    atorvastatin (Lipitor) tablet 80 mg, 80 mg, oral, Nightly, CARLO Navarro-CNP, 80 mg at 03/11/24 2054    brimonidine (AlphaGAN) 0.2 % ophthalmic solution 1 drop, 1 drop, Right Eye, BID, BRENDA Navarro, 1 drop at 03/11/24 2054    carbamide peroxide (Debrox) 6.5 % otic solution 5 drop, 5 drop, Left Ear, BID, DorinaBRENDA Enrique, 5 drop at 03/11/24 2054    carbidopa-levodopa (Sinemet)  mg per tablet 1 tablet, 1 tablet, oral, BID, BRENDA Navarro, 1 tablet at 03/11/24 2053    cefTRIAXone (Rocephin) 2 g IV in dextrose 5% 50 mL, 2 g, intravenous, q24h, BRENDA Navarro, Stopped at 03/12/24 0117    cholecalciferol (Vitamin D-3) tablet 4,000 Units, 4,000 Units, oral, Daily, BRENDA Navarro, 4,000 Units at 03/11/24 1009    dextrose 10 % in water (D10W) infusion, 0.3 g/kg/hr, intravenous, Once PRN, BRENDA Moulton    dextrose 50 % injection 25 g, 25 g, intravenous, q15 min PRN, BRENDA Moulton    gabapentin (Neurontin) capsule 100 mg, 100 mg, oral, BID, BRENDA Navarro, 100 mg at 03/11/24 2053    [Held by provider] glipiZIDE (Glucotrol) tablet 2.5 mg, 2.5 mg, oral, BID AC, BRENDA Navarro    glucagon (Glucagen) injection 1 mg, 1 mg, intramuscular, q15 min PRN, BRENDA Moulton    insulin lispro (HumaLOG) injection 0-5 Units, 0-5 Units, subcutaneous, Before meals & nightly, Oralia RAMIREZ  Gramajo, APRN-CNP, 1 Units at 03/11/24 2117    [Held by provider] insulin lispro (HumaLOG) injection 4 Units, 4 Units, subcutaneous, Before meals & nightly, BRENDA Navarro    ipratropium-albuteroL (Duo-Neb) 0.5-2.5 mg/3 mL nebulizer solution 3 mL, 3 mL, nebulization, q2h PRN, BRENDA Navarro    melatonin tablet 3 mg, 3 mg, oral, Daily PRN, BRENDA Navarro    metoprolol succinate XL (Toprol-XL) 24 hr tablet 50 mg, 50 mg, oral, Daily, BRENDA Navarro, 50 mg at 03/11/24 0900    oxygen (O2) therapy, , inhalation, Continuous PRN - O2/gases, BRENDA Navarro, 2 L/min at 03/11/24 2000    pantoprazole (ProtoNix) injection 40 mg, 40 mg, intravenous, BID, BRENDA Hendrix, 40 mg at 03/11/24 2118    polyethylene glycol (Glycolax, Miralax) packet 17 g, 17 g, oral, Daily PRN, BRENDA Navarro    sodium bicarbonate tablet 650 mg, 650 mg, oral, BID, Dylon Marin MD, 650 mg at 03/11/24 2053    sodium chloride 0.9% flush 10 mL, 10 mL, intravenous, BID, BRENDA Navarro, 10 mL at 03/11/24 2118    thiamine (Vitamin B-1) tablet 100 mg, 100 mg, oral, Daily, BRENDA Navarro, 100 mg at 03/11/24 1009    PERTINENT ROS:  GENERAL:  positive for fatigue, poor appetite.  No fever/chills  RESPIRATORY:  positive for shortness of breath.  Negative for cough, wheezing.  CARDIOVASCULAR:   Negative for chest pain or palpitation.  GI:  Negative for abdominal pain, diarrhea, heartburn, nausea, vomiting  : Indwelling Conner catheter in place    Physical Exam:  Vital signs in last 24 hours:  Temp:  [36.1 °C (97 °F)-36.7 °C (98.1 °F)] 36.6 °C (97.9 °F)  Heart Rate:  [60-74] 68  Resp:  [14-22] 14  BP: (124-169)/() 147/75    General: Awake, cooperative, not in acute distress, right central catheter in place with no signs of infection at exit site.  HEENT:  NCAT,  mucous membranes moist and pink  NECK:  Elevated JVD, no carotid bruit, supple, no cervical mass or  thyromegaly  LUNGS;  Diminished breath sounds, fine Rales  CV:  Distant, regular rate and rhythm, + murmurs  ABDOMEN:  abdomen soft, nontender, ongoing GI bleed  EDEMA:  trace lower extremity edema/dependent edema  SKIN: Right breast with dressing in place  : Indwelling Conner catheter in place    Intake/Output last 3 shifts:  I/O last 3 completed shifts:  In: 465.9 (5.3 mL/kg) [Blood:415.9; IV Piggyback:50]  Out: 1800 (20.3 mL/kg) [Urine:1800 (0.6 mL/kg/hr)]  Weight: 88.6 kg     DATA:  Diagnotic tests reviewed for Todays visit:  Results from last 7 days   Lab Units 03/12/24  0641   WBC AUTO x10*3/uL 6.2   RBC AUTO x10*6/uL 2.74*   HEMOGLOBIN g/dL 8.1*   HEMATOCRIT % 25.9*       Results from last 7 days   Lab Units 03/12/24  0641 03/10/24  0651 03/09/24  0555 03/08/24  1647   SODIUM mmol/L 138   < > 140 138   POTASSIUM mmol/L 4.3   < > 5.0 4.7   CHLORIDE mmol/L 110*   < > 112* 110*   CO2 mmol/L 23   < > 19* 21   BUN mg/dL 47*   < > 65* 69*   CREATININE mg/dL 2.20*   < > 2.32* 2.34*   CALCIUM mg/dL 8.3*   < > 8.0* 8.3*   PHOSPHORUS mg/dL  --   --  5.5*  --    MAGNESIUM mg/dL 2.02   < > 2.10  --    BILIRUBIN TOTAL mg/dL  --   --   --  0.3   ALT U/L  --   --   --  3*   AST U/L  --   --   --  8*    < > = values in this interval not displayed.       Results from last 7 days   Lab Units 03/09/24  0421   COLOR U  Yellow   APPEARANCE U  Hazy*   PH U  5.0   SPEC GRAV UR  1.029   PROTEIN U mg/dL 100 (2+)*   BLOOD UR  LARGE (3+)*   NITRITE U  NEGATIVE   WBC UR /HPF >50*       IMAGING: CXR reviewed in  images      SIGNATURE: Dylon Marin MD  Nephrology and Hypertension  [address]  Office phone: [phone]  FAX: [phone]    This note was partially generated using the Dragon voice recognition system, and there may be some incorrect words, spelling's and punctuation that were not noted in checking the note before saving.

## 2024-03-12 NOTE — CONSULTS
Wound Care Consult     Visit Date: 3/11/2024      Patient Name: Vamsi Benson         MRN: [id number]           YOB: 1945     Reason for Consult: Coccyx and right hand wound        Wound History: present on admission     Pertinent Labs:   Albumin   Date Value Ref Range Status   03/08/2024 2.1 (L) 3.4 - 5.0 g/dL Final       Wound Assessment:  Wound 03/08/24  Hand Dorsal;Right (Active)   Wound Image    03/09/24 0810       Wound 03/08/24 Pressure Injury Ischium (Active)   Wound Image   03/09/24 0810   Site Assessment Clean;Pink 03/09/24 0810   Polly-Wound Assessment Blanchable erythema 03/10/24 1500   Shape slit 03/10/24 0800   Wound Length (cm) 1 cm 03/10/24 0800   Wound Width (cm) 0.2 cm 03/10/24 0800   Wound Surface Area (cm^2) 0.2 cm^2 03/10/24 0800   Drainage Description None 03/10/24 2100   Drainage Amount None 03/10/24 2100   Dressing Foam 03/11/24 1053   Dressing Changed Changed 03/11/24 1053   Dressing Status Clean;Dry;Occlusive 03/11/24 1053       Wound Team Summary Assessment: Nurse had just done patient's dressing. I was planning to remove the sutures but patient wanted it done later. I did see pictures.     Wound Team Plan: Will follow up      Kaitlin Rondon RN  3/11/2024  9:38 PM

## 2024-03-12 NOTE — PROGRESS NOTES
Internal Medicine Progress Note    Patient Name: Vamsi Benson          MRN: [id number]  Today's Date: March 11, 2024          Attending: Rufino Osman MD    Subjective:  Patient was seen and examined at bedside.    Review Of Systems:  GENERAL: generalized weakness  CARDIOVASCULAR: Negative for chest pain  RESPIRATORY: Negative for shortness of breath  GI: No nausea, vomiting, or diarrhea    Objective:  Vitals:    03/11/24 1145 03/11/24 1230 03/11/24 1600 03/11/24 1635   BP: 152/74 (!) 160/100 164/79    BP Location:   Left arm    Patient Position:   Lying    Pulse: 64 60 66    Resp: 18 22 18    Temp: 36.1 °C (97 °F) 36.6 °C (97.9 °F) 36.6 °C (97.9 °F)    TempSrc: Temporal Tympanic Temporal    SpO2:  99% 92% 91%   Weight:       Height:               Physical Exam:   General appearance: Well-appearing alert, in no acute distress   Lungs: Clear to auscultation, no wheezing or rhonchi  Heart: RRR without murmur, gallop, or rubs.  Abdomen: Soft, non-tender. Bowel sounds normal.  Extremities: Right hand is wrapped with dressing  Neuro: Alert oriented x3, no focal deficit.    Labs:  Results for orders placed or performed during the hospital encounter of 03/08/24 (from the past 24 hour(s))   Magnesium   Result Value Ref Range    Magnesium 2.02 1.60 - 2.40 mg/dL   CBC   Result Value Ref Range    WBC 5.3 4.4 - 11.3 x10*3/uL    nRBC 0.0 0.0 - 0.0 /100 WBCs    RBC 2.28 (L) 4.50 - 5.90 x10*6/uL    Hemoglobin 6.7 (L) 13.5 - 17.5 g/dL    Hematocrit 22.0 (L) 41.0 - 52.0 %    MCV 97 80 - 100 fL    MCH 29.4 26.0 - 34.0 pg    MCHC 30.5 (L) 32.0 - 36.0 g/dL    RDW 15.8 (H) 11.5 - 14.5 %    Platelets 198 150 - 450 x10*3/uL   Basic metabolic panel   Result Value Ref Range    Glucose 146 (H) 74 - 99 mg/dL    Sodium 140 136 - 145 mmol/L    Potassium 4.3 3.5 - 5.3 mmol/L    Chloride 113 (H) 98 - 107 mmol/L    Bicarbonate 21 21 - 32 mmol/L    Anion Gap 10 10 - 20 mmol/L    Urea Nitrogen 53 (H) 6 - 23 mg/dL    Creatinine 2.13 (H) 0.50 - 1.30  mg/dL    eGFR 31 (L) >60 mL/min/1.73m*2    Calcium 8.0 (L) 8.6 - 10.3 mg/dL   Prepare RBC: 1 Units   Result Value Ref Range    PRODUCT CODE T2431T75     Unit Number [id number]     Unit ABO O     Unit RH POS     XM INTEP COMP     Dispense Status TR     Blood Expiration Date April 13, 2024 23:59 EDT     PRODUCT BLOOD TYPE 5100     UNIT VOLUME 400    Transthoracic Echo (TTE) Complete   Result Value Ref Range    AV pk teresa 1.19 m/s    Tricuspid annular plane systolic excursion 1.2 cm    LA vol index A/L 31.1 ml/m2    AV mn grad 2.9 mmHg    RV free wall pk S' 11.00 cm/s    RVSP 31.3 mmHg    LVIDd 5.33 cm    AV pk grad 5.7 mmHg   VERIFY ABO/Rh Group Test   Result Value Ref Range    ABO TYPE O     Rh TYPE POS    POCT GLUCOSE   Result Value Ref Range    POCT Glucose 159 (H) 74 - 99 mg/dL       Medications:  Scheduled medications  atorvastatin, 80 mg, oral, Nightly  brimonidine, 1 drop, Right Eye, BID  carbamide peroxide, 5 drop, Left Ear, BID  carbidopa-levodopa, 1 tablet, oral, BID  cefTRIAXone, 2 g, intravenous, q24h  cholecalciferol, 4,000 Units, oral, Daily  gabapentin, 100 mg, oral, BID  [Held by provider] glipiZIDE, 2.5 mg, oral, BID AC  insulin lispro, 0-5 Units, subcutaneous, Before meals & nightly  [Held by provider] insulin lispro, 4 Units, subcutaneous, Before meals & nightly  metoprolol succinate XL, 50 mg, oral, Daily  pantoprazole, 40 mg, intravenous, BID  sodium bicarbonate, 650 mg, oral, BID  sodium chloride 0.9%, 10 mL, intravenous, BID  thiamine, 100 mg, oral, Daily      Continuous medications     PRN medications  PRN medications: acetaminophen, dextrose 10 % in water (D10W), dextrose, glucagon, ipratropium-albuteroL, melatonin, oxygen, polyethylene glycol      Assessment/Plan:  Principal Problem:    Gastrointestinal hemorrhage with melena  Continue PPI p.o. twice daily  Monitor blood count  Today patient dropped hemoglobin to 6.7, received RBC transfusion  GI still recommending conservative  "management      Hyperlipidemia  Continue atorvastatin      HTN (hypertension)  Continue metoprolol      Extensor tenosynovitis of right wrist    Wound on right hand  Continue 2 g Rocephin IV daily  ID is following  Orthopedic surgery is following  Wound care      Atrial fibrillation (CMS/[facility])  Rate controlled  Continue monitoring      Insulin dependent type 2 diabetes mellitus (CMS/HCC)  Sliding scale insulin      FRANCIE (acute kidney injury) (CMS/[facility])  Improving slowly  Nephrology is following      Metabolic acidosis  Resolved  Continue current medications    Discussed with patient, RN    Rufino Osman MD   Date: 03/11/24  Time: 9:47 PM    This note was partially created using voice recognition software and is inherently subject to errors including those of syntax and \"sound-alike\" substitutions which may escape proofreading. In such instances, original meaning may be extrapolated by contextual derivation  "

## 2024-03-12 NOTE — PROGRESS NOTES
"INPATIENT PROGRESS NOTES    PATIENT NAME: Vamsi Benson    MRN: [id number]  SERVICE DATE:  3/12/2024   SERVICE TIME:  12:51 PM    SIGNATURE: Aneudy Raymond MD      SUBJECTIVE  Afebrile  No events overnight       OBJECTIVE  PHYSICAL EXAM:   Patient Vitals for the past 24 hrs:   BP Temp Temp src Pulse Resp SpO2 Weight   03/12/24 1200 (!) 150/101 36.5 °C (97.7 °F) Temporal 58 14 100 % --   03/12/24 0800 147/75 36.6 °C (97.9 °F) Temporal 68 14 98 % --   03/12/24 0600 -- -- -- -- -- -- 88.6 kg (195 lb 5.2 oz)   03/12/24 0400 144/76 36.7 °C (98.1 °F) Temporal 73 16 98 % --   03/12/24 0000 149/78 36.1 °C (97 °F) Temporal 65 18 98 % --   03/11/24 2000 124/83 36.5 °C (97.7 °F) Temporal 74 16 100 % --   03/11/24 1635 -- -- -- -- -- 91 % --   03/11/24 1600 164/79 36.6 °C (97.9 °F) Temporal 66 18 92 % --         Gen: NAD  Neck: symmetric, no mass  Cardiovascular: RRR  Respiratory: No distress   GI: Abd soft, nontender, non-distended  Extremities: no leg edema  Skin: Warm and dry.      Labs:  Lab Results   Component Value Date    WBC 6.2 03/12/2024    HGB 8.1 (L) 03/12/2024    HCT 25.9 (L) 03/12/2024    MCV 95 03/12/2024     03/12/2024     Lab Results   Component Value Date    GLUCOSE 129 (H) 03/12/2024    CALCIUM 8.3 (L) 03/12/2024     03/12/2024    K 4.3 03/12/2024    CO2 23 03/12/2024     (H) 03/12/2024    BUN 47 (H) 03/12/2024    CREATININE 2.20 (H) 03/12/2024   ESR: --No results found for: \"SEDRATE\"  Lab Results   Component Value Date    CRP 6.16 (H) 03/09/2024     Lab Results   Component Value Date    ALT 3 (L) 03/08/2024    AST 8 (L) 03/08/2024    ALKPHOS 52 03/08/2024    BILITOT 0.3 03/08/2024         DATA:   Diagnostic tests reviewed for today's visit:    Labs this admission reviewed  Imagings this admission reviewed  Cultures: Reviewed        ASSESSMENT :   -Recent right hand abscess with tenosynovitis status post I&D on 2/23 at [facility]  -Recent group A strep bacteremia  -Latent syphilis with " most recent RPR 1:1.  Reported receiving 2 doses of IM penicillin as scheduled for the third dose on 3/11  -Acute anemia  -History of A-fib, renal failure, hypertension     PLAN:  -Continue ceftriaxone 2 g IV every 24 hours through 3/27 and follow-up with ID at [facility]  -Tolerating antibiotics with no side effects  -Received third dose of weekly IM penicillin on 3/11 to complete the course of latent syphilis  -Repeat RPR in 3 to 6 months  -Positive urine culture for Candida albicans without symptoms likely colonization    The plan was discussed with the pharmacy team    Aneudy Raymond MD.   Infectious Diseases Attending

## 2024-03-12 NOTE — PROGRESS NOTES
03/12/24 1623   Discharge Planning   Home or Post Acute Services Post acute facilities (Rehab/SNF/etc)   Type of Post Acute Facility Services Skilled nursing   Patient expects to be discharged to: snf     Spoke with the pt and his son. Advised them that [facility] can accept. They would also like referrals sent to [facility] and [facility]. Sent the referral.  Also, provided a list of medicaid waiver approved assisted living facilities.

## 2024-03-13 LAB
ALBUMIN SERPL BCP-MCNC: 2.1 G/DL (ref 3.4–5)
ALP SERPL-CCNC: 50 U/L (ref 33–136)
ALT SERPL W P-5'-P-CCNC: <3 U/L (ref 10–52)
ANION GAP SERPL CALC-SCNC: 12 MMOL/L (ref 10–20)
AST SERPL W P-5'-P-CCNC: 11 U/L (ref 9–39)
BACTERIA BLD CULT: NORMAL
BACTERIA BLD CULT: NORMAL
BILIRUB DIRECT SERPL-MCNC: 0.1 MG/DL (ref 0–0.3)
BILIRUB SERPL-MCNC: 0.3 MG/DL (ref 0–1.2)
BUN SERPL-MCNC: 46 MG/DL (ref 6–23)
CALCIUM SERPL-MCNC: 8.1 MG/DL (ref 8.6–10.3)
CHLORIDE SERPL-SCNC: 112 MMOL/L (ref 98–107)
CO2 SERPL-SCNC: 22 MMOL/L (ref 21–32)
CREAT SERPL-MCNC: 2.06 MG/DL (ref 0.5–1.3)
EGFRCR SERPLBLD CKD-EPI 2021: 32 ML/MIN/1.73M*2
ERYTHROCYTE [DISTWIDTH] IN BLOOD BY AUTOMATED COUNT: 15.9 % (ref 11.5–14.5)
GLUCOSE BLD MANUAL STRIP-MCNC: 118 MG/DL (ref 74–99)
GLUCOSE BLD MANUAL STRIP-MCNC: 126 MG/DL (ref 74–99)
GLUCOSE BLD MANUAL STRIP-MCNC: 132 MG/DL (ref 74–99)
GLUCOSE BLD MANUAL STRIP-MCNC: 132 MG/DL (ref 74–99)
GLUCOSE SERPL-MCNC: 114 MG/DL (ref 74–99)
HCT VFR BLD AUTO: 25.6 % (ref 41–52)
HGB BLD-MCNC: 7.8 G/DL (ref 13.5–17.5)
INR PPP: 1.4 (ref 0.9–1.1)
MAGNESIUM SERPL-MCNC: 1.89 MG/DL (ref 1.6–2.4)
MCH RBC QN AUTO: 29.4 PG (ref 26–34)
MCHC RBC AUTO-ENTMCNC: 30.5 G/DL (ref 32–36)
MCV RBC AUTO: 97 FL (ref 80–100)
NRBC BLD-RTO: 0 /100 WBCS (ref 0–0)
PLATELET # BLD AUTO: 187 X10*3/UL (ref 150–450)
PMV BLD AUTO: 10.5 FL (ref 7.5–11.5)
POTASSIUM SERPL-SCNC: 4.4 MMOL/L (ref 3.5–5.3)
PROT SERPL-MCNC: 6.3 G/DL (ref 6.4–8.2)
PROTHROMBIN TIME: 15.3 SECONDS (ref 9.8–12.8)
RBC # BLD AUTO: 2.65 X10*6/UL (ref 4.5–5.9)
SODIUM SERPL-SCNC: 142 MMOL/L (ref 136–145)
WBC # BLD AUTO: 5.7 X10*3/UL (ref 4.4–11.3)

## 2024-03-13 PROCEDURE — 2500000001 HC RX 250 WO HCPCS SELF ADMINISTERED DRUGS (ALT 637 FOR MEDICARE OP): Performed by: NURSE PRACTITIONER

## 2024-03-13 PROCEDURE — 82947 ASSAY GLUCOSE BLOOD QUANT: CPT

## 2024-03-13 PROCEDURE — 85610 PROTHROMBIN TIME: CPT | Performed by: NURSE PRACTITIONER

## 2024-03-13 PROCEDURE — 2500000004 HC RX 250 GENERAL PHARMACY W/ HCPCS (ALT 636 FOR OP/ED): Performed by: NURSE PRACTITIONER

## 2024-03-13 PROCEDURE — 83735 ASSAY OF MAGNESIUM: CPT | Performed by: NURSE PRACTITIONER

## 2024-03-13 PROCEDURE — 80053 COMPREHEN METABOLIC PANEL: CPT | Performed by: NURSE PRACTITIONER

## 2024-03-13 PROCEDURE — 1200000002 HC GENERAL ROOM WITH TELEMETRY DAILY

## 2024-03-13 PROCEDURE — 84075 ASSAY ALKALINE PHOSPHATASE: CPT | Performed by: INTERNAL MEDICINE

## 2024-03-13 PROCEDURE — 2500000001 HC RX 250 WO HCPCS SELF ADMINISTERED DRUGS (ALT 637 FOR MEDICARE OP): Performed by: INTERNAL MEDICINE

## 2024-03-13 PROCEDURE — C9113 INJ PANTOPRAZOLE SODIUM, VIA: HCPCS | Performed by: NURSE PRACTITIONER

## 2024-03-13 PROCEDURE — 85027 COMPLETE CBC AUTOMATED: CPT | Performed by: NURSE PRACTITIONER

## 2024-03-13 PROCEDURE — 2500000004 HC RX 250 GENERAL PHARMACY W/ HCPCS (ALT 636 FOR OP/ED): Performed by: INTERNAL MEDICINE

## 2024-03-13 PROCEDURE — 87081 CULTURE SCREEN ONLY: CPT | Mod: STJLAB | Performed by: INTERNAL MEDICINE

## 2024-03-13 RX ORDER — FUROSEMIDE 20 MG/1
20 TABLET ORAL
Status: DISCONTINUED | OUTPATIENT
Start: 2024-03-14 | End: 2024-03-16

## 2024-03-13 RX ORDER — LIDOCAINE HYDROCHLORIDE 10 MG/ML
5 INJECTION, SOLUTION EPIDURAL; INFILTRATION; INTRACAUDAL; PERINEURAL ONCE
Status: DISCONTINUED | OUTPATIENT
Start: 2024-03-13 | End: 2024-03-19 | Stop reason: HOSPADM

## 2024-03-13 RX ADMIN — GABAPENTIN 100 MG: 100 CAPSULE ORAL at 10:23

## 2024-03-13 RX ADMIN — CARBIDOPA AND LEVODOPA 1 TABLET: 25; 100 TABLET ORAL at 10:22

## 2024-03-13 RX ADMIN — SODIUM BICARBONATE 650 MG: 650 TABLET ORAL at 10:22

## 2024-03-13 RX ADMIN — ACETAMINOPHEN 650 MG: 325 TABLET ORAL at 23:21

## 2024-03-13 RX ADMIN — CARBIDOPA AND LEVODOPA 1 TABLET: 25; 100 TABLET ORAL at 22:02

## 2024-03-13 RX ADMIN — GABAPENTIN 100 MG: 100 CAPSULE ORAL at 22:03

## 2024-03-13 RX ADMIN — Medication 3 MG: at 23:21

## 2024-03-13 RX ADMIN — FUROSEMIDE 20 MG: 20 TABLET ORAL at 10:23

## 2024-03-13 RX ADMIN — PANTOPRAZOLE SODIUM 40 MG: 40 INJECTION, POWDER, FOR SOLUTION INTRAVENOUS at 22:05

## 2024-03-13 RX ADMIN — Medication 4000 UNITS: at 10:22

## 2024-03-13 RX ADMIN — THIAMINE HCL TAB 100 MG 100 MG: 100 TAB at 10:22

## 2024-03-13 RX ADMIN — BRIMONIDINE TARTRATE 1 DROP: 2 SOLUTION OPHTHALMIC at 10:21

## 2024-03-13 RX ADMIN — Medication 10 ML: at 22:05

## 2024-03-13 RX ADMIN — SODIUM BICARBONATE 650 MG: 650 TABLET ORAL at 22:03

## 2024-03-13 RX ADMIN — ATORVASTATIN CALCIUM 80 MG: 80 TABLET, FILM COATED ORAL at 23:21

## 2024-03-13 RX ADMIN — PANTOPRAZOLE SODIUM 40 MG: 40 INJECTION, POWDER, FOR SOLUTION INTRAVENOUS at 10:19

## 2024-03-13 RX ADMIN — BRIMONIDINE TARTRATE 1 DROP: 2 SOLUTION OPHTHALMIC at 22:04

## 2024-03-13 RX ADMIN — Medication 5 DROP: at 22:03

## 2024-03-13 RX ADMIN — Medication 10 ML: at 10:48

## 2024-03-13 RX ADMIN — CEFTRIAXONE SODIUM 2 G: 2 INJECTION, SOLUTION INTRAVENOUS at 17:30

## 2024-03-13 RX ADMIN — Medication 5 DROP: at 10:21

## 2024-03-13 ASSESSMENT — COGNITIVE AND FUNCTIONAL STATUS - GENERAL
EATING MEALS: A LOT
PERSONAL GROOMING: A LOT
CLIMB 3 TO 5 STEPS WITH RAILING: TOTAL
DRESSING REGULAR LOWER BODY CLOTHING: TOTAL
MOVING FROM LYING ON BACK TO SITTING ON SIDE OF FLAT BED WITH BEDRAILS: TOTAL
STANDING UP FROM CHAIR USING ARMS: TOTAL
WALKING IN HOSPITAL ROOM: TOTAL
TOILETING: TOTAL
TURNING FROM BACK TO SIDE WHILE IN FLAT BAD: TOTAL
DRESSING REGULAR UPPER BODY CLOTHING: TOTAL
MOVING TO AND FROM BED TO CHAIR: TOTAL
MOBILITY SCORE: 6
DAILY ACTIVITIY SCORE: 8
HELP NEEDED FOR BATHING: TOTAL

## 2024-03-13 ASSESSMENT — PAIN - FUNCTIONAL ASSESSMENT
PAIN_FUNCTIONAL_ASSESSMENT: 0-10

## 2024-03-13 ASSESSMENT — PAIN SCALES - GENERAL
PAINLEVEL_OUTOF10: 0 - NO PAIN
PAINLEVEL_OUTOF10: 8
PAINLEVEL_OUTOF10: 0 - NO PAIN
PAINLEVEL_OUTOF10: 8
PAINLEVEL_OUTOF10: 0 - NO PAIN

## 2024-03-13 ASSESSMENT — PAIN DESCRIPTION - DESCRIPTORS: DESCRIPTORS: DISCOMFORT

## 2024-03-13 NOTE — PROGRESS NOTES
Internal Medicine Progress Note    Patient Name: Vamsi Benson          MRN: [id number]  Today's Date: March 13, 2024          Attending: Rufino Osman MD    Subjective:  Patient was seen and examined at bedside.    Review Of Systems:  CARDIOVASCULAR: Negative for chest pain  RESPIRATORY: Negative for shortness of breath  GI: No nausea, vomiting, or diarrhea    Objective:  Vitals:    03/12/24 2000 03/13/24 0000 03/13/24 0400 03/13/24 0600   BP: 156/77 160/77 148/68    BP Location:       Patient Position:       Pulse: 68 69 70    Resp: 16 16 18    Temp: 36.3 °C (97.3 °F) 36.4 °C (97.5 °F) 36.2 °C (97.2 °F)    TempSrc:       SpO2: 97% 97% 98%    Weight:    88.3 kg (194 lb 11.2 oz)   Height:               Physical Exam:   General appearance: Alert, in no acute distress  Lungs: Diminished breath sounds  Heart: RRR without murmur, gallop, or rubs.  Abdomen: Soft, non-tender. Bowel sounds normal.  Neuro: Alert oriented x3, no focal deficit.    Labs:  Results for orders placed or performed during the hospital encounter of 03/08/24 (from the past 24 hour(s))   POCT GLUCOSE   Result Value Ref Range    POCT Glucose 127 (H) 74 - 99 mg/dL   POCT GLUCOSE   Result Value Ref Range    POCT Glucose 136 (H) 74 - 99 mg/dL   Magnesium   Result Value Ref Range    Magnesium 1.89 1.60 - 2.40 mg/dL   CBC   Result Value Ref Range    WBC 5.7 4.4 - 11.3 x10*3/uL    nRBC 0.0 0.0 - 0.0 /100 WBCs    RBC 2.65 (L) 4.50 - 5.90 x10*6/uL    Hemoglobin 7.8 (L) 13.5 - 17.5 g/dL    Hematocrit 25.6 (L) 41.0 - 52.0 %    MCV 97 80 - 100 fL    MCH 29.4 26.0 - 34.0 pg    MCHC 30.5 (L) 32.0 - 36.0 g/dL    RDW 15.9 (H) 11.5 - 14.5 %    Platelets 187 150 - 450 x10*3/uL    MPV 10.5 7.5 - 11.5 fL   Basic metabolic panel   Result Value Ref Range    Glucose 114 (H) 74 - 99 mg/dL    Sodium 142 136 - 145 mmol/L    Potassium 4.4 3.5 - 5.3 mmol/L    Chloride 112 (H) 98 - 107 mmol/L    Bicarbonate 22 21 - 32 mmol/L    Anion Gap 12 10 - 20 mmol/L    Urea Nitrogen 46 (H) 6  - 23 mg/dL    Creatinine 2.06 (H) 0.50 - 1.30 mg/dL    eGFR 32 (L) >60 mL/min/1.73m*2    Calcium 8.1 (L) 8.6 - 10.3 mg/dL   POCT GLUCOSE   Result Value Ref Range    POCT Glucose 118 (H) 74 - 99 mg/dL       Medications:  Scheduled medications  atorvastatin, 80 mg, oral, Nightly  brimonidine, 1 drop, Right Eye, BID  carbamide peroxide, 5 drop, Left Ear, BID  carbidopa-levodopa, 1 tablet, oral, BID  cefTRIAXone, 2 g, intravenous, q24h  cholecalciferol, 4,000 Units, oral, Daily  furosemide, 20 mg, oral, Daily  gabapentin, 100 mg, oral, BID  [Held by provider] glipiZIDE, 2.5 mg, oral, BID AC  insulin lispro, 0-5 Units, subcutaneous, Before meals & nightly  [Held by provider] insulin lispro, 4 Units, subcutaneous, Before meals & nightly  metoprolol succinate XL, 50 mg, oral, Daily  pantoprazole, 40 mg, intravenous, BID  sodium bicarbonate, 650 mg, oral, BID  sodium chloride 0.9%, 10 mL, intravenous, BID  thiamine, 100 mg, oral, Daily      Continuous medications     PRN medications  PRN medications: acetaminophen, dextrose 10 % in water (D10W), dextrose, glucagon, ipratropium-albuteroL, melatonin, oxygen, polyethylene glycol      Assessment/Plan:  Principal Problem:    Gastrointestinal hemorrhage with melena  Continue PPI p.o. twice daily  Hemoglobin 7.8  Monitor blood count  GI is following      Hyperlipidemia  Continue atorvastatin      HTN (hypertension)  Continue metoprolol      Extensor tenosynovitis of right wrist    Wound on right hand  Continue 2 g Rocephin IV daily  ID is following  Orthopedic surgery is following  Wound care      Atrial fibrillation (CMS/[facility])  Rate controlled  Will keep anticoagulation on hold till he gets cleared by GI  Continue monitoring      Insulin dependent type 2 diabetes mellitus (CMS/[facility])  Sliding scale insulin      FRANCIE (acute kidney injury) (CMS/[facility])  Serum creatinine 2.06  Continue monitoring  Nephrology is following      Metabolic acidosis  Resolved  Continue current  "medications    Discussed with patient, RN    Rufino Osman MD   Date: 03/13/24  Time: 8:21 AM    This note was partially created using voice recognition software and is inherently subject to errors including those of syntax and \"sound-alike\" substitutions which may escape proofreading. In such instances, original meaning may be extrapolated by contextual derivation  "

## 2024-03-13 NOTE — NURSING NOTE
Met with pt at bedside to discuss goals of care at length. We reviewed pts recent hospital stay at an OSH as well as his stay at the SNF and participating therapy. Pt voices frustration with his inability to walk since admission to OSH. He states that he often feels too tired to do PT at the NH or they come at times when he is eating or has visitors. We discussed the concerns his son voiced to this writer yesterday and also explored other options such as comfort care/ hospice. After much discussion the pt states that he would like to try SNF one more time and understands that he needs to participate in therapy even when he doesn't feel like it if his goal is to try and walk again. Pt and son are agreeable to outpatient palliative care following up at discharge for ongoing goals of care and symptom management.     We also discussed code status in the context of pts current and chronic commodities. Diagram was written for pt to understand more palinly and we reviewed the different levels of care. Pt is contemplating change code status to DNAR CCA DNI, he would like more time to think about this.    Plan to follow up tomorrow regarding code status as well as refer to [facility] for outpatient palliative services  Will follow

## 2024-03-13 NOTE — PROGRESS NOTES
Occupational Therapy                 Therapy Communication Note    Patient Name: Vamsi Benson  MRN: [id number]  Today's Date: 3/13/2024     Discipline: Occupational Therapy    Missed Visit Reason: Missed Visit Reason: Other (Comment) (Another service at bedside. Third attempt today)    Missed Time: Attempt    Comment:

## 2024-03-13 NOTE — DISCHARGE INSTRUCTIONS
No NSAIDs.       Repeat BMP to be completed in 1 week and faxed to nephrologist Dr. Vincent [phone]        Comments  Aspiration precautions  Upright for intake and for at least 30 minutes after, small bites/sips, slow rate, assist with feeding, meds one at a time            Diet  Question Answer   Type: Carb Controlled   Carb diet selection: 60 gram carb/meal, 30 gram Carb evening snack   Texture Bite size food 6   Fluid consistency Mild thick 2   Select tray type: 1:1 Feeding       Meds in puree

## 2024-03-13 NOTE — PROGRESS NOTES
"INPATIENT PROGRESS NOTES    PATIENT NAME: Vamsi Benson    MRN: [id number]  SERVICE DATE:  3/13/2024   SERVICE TIME:  12:42 PM    SIGNATURE: Aneudy Raymond MD      SUBJECTIVE  Afebrile  No events overnight       OBJECTIVE  PHYSICAL EXAM:   Patient Vitals for the past 24 hrs:   BP Temp Temp src Pulse Resp SpO2 Weight   03/13/24 1235 170/76 36.3 °C (97.3 °F) Tympanic 72 18 100 % --   03/13/24 0805 165/84 36.2 °C (97.2 °F) Tympanic 66 18 97 % --   03/13/24 0600 -- -- -- -- -- -- 88.3 kg (194 lb 11.2 oz)   03/13/24 0400 148/68 36.2 °C (97.2 °F) -- 70 18 98 % --   03/13/24 0000 160/77 36.4 °C (97.5 °F) -- 69 16 97 % --   03/12/24 2000 156/77 36.3 °C (97.3 °F) -- 68 16 97 % --   03/12/24 1600 158/87 36.4 °C (97.5 °F) Temporal 66 16 99 % --         Gen: NAD  Neck: symmetric, no mass  Cardiovascular: RRR  Respiratory: No distress   GI: Abd soft, nontender, non-distended  Extremities: no leg edema  Skin: Warm and dry.      Labs:  Lab Results   Component Value Date    WBC 5.7 03/13/2024    HGB 7.8 (L) 03/13/2024    HCT 25.6 (L) 03/13/2024    MCV 97 03/13/2024     03/13/2024     Lab Results   Component Value Date    GLUCOSE 114 (H) 03/13/2024    CALCIUM 8.1 (L) 03/13/2024     03/13/2024    K 4.4 03/13/2024    CO2 22 03/13/2024     (H) 03/13/2024    BUN 46 (H) 03/13/2024    CREATININE 2.06 (H) 03/13/2024   ESR: --No results found for: \"SEDRATE\"  Lab Results   Component Value Date    CRP 6.16 (H) 03/09/2024     Lab Results   Component Value Date    ALT 3 (L) 03/08/2024    AST 8 (L) 03/08/2024    ALKPHOS 52 03/08/2024    BILITOT 0.3 03/08/2024         DATA:   Diagnostic tests reviewed for today's visit:    Labs this admission reviewed  Imagings this admission reviewed  Cultures: Reviewed        ASSESSMENT :   -Recent right hand abscess with tenosynovitis status post I&D on 2/23 at [facility]  -Recent group A strep bacteremia  -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  Reported receiving 2 doses of IM " penicillin as scheduled for the third dose on 3/11  -Acute anemia  -History of A-fib, renal failure, hypertension     PLAN:  -Continue ceftriaxone 2 g IV every 24 hours through 3/27 and follow-up with ID at [facility]  -Tolerating antibiotics with no side effects.  Repeat LFTs today  -Received third dose of weekly IM penicillin on 3/11 to complete the course of latent syphilis  -Repeat RPR in 3 to 6 months  -Positive urine culture for Candida albicans without symptoms likely colonization    The plan was discussed with the pharmacy team    Aneudy Raymond MD.   Infectious Diseases Attending

## 2024-03-13 NOTE — NURSING NOTE
Patient a&ox4. Stable this shift. Q 2 turns. Denies any pain or discomfort. Continues on abx for latent syphilis. Patient currently in bed resting with bed alarm on, call light within reach, bed locked and in lowest position.

## 2024-03-13 NOTE — PROGRESS NOTES
Spiritual Care Visit    Clinical Encounter Type  Visited With: Patient  Routine Visit: Introduction  Continue Visiting: No                                            Taxonomy  Intended Effects: Meaning-making, Preserve dignity and respect, Shobha affirmation, Promote sense of peace  Methods: Offer spiritual/Methodist support  Interventions: Share words of hope and inspiration, [place]    Patient appeared very weak and could not speak up.   listened very closely and was able to understand some words.   Patient said he goes to a [employer].   prayed at his request.

## 2024-03-13 NOTE — CARE PLAN
Problem: Psychosocial Needs  Goal: Demonstrates ability to cope with hospitalization/illness  Outcome: Met     Problem: Skin  Goal: Prevent/manage excess moisture  3/13/2024 1816 by Nicole Augustine RN  Outcome: Met  3/13/2024 1254 by Nicole Augustine RN  Flowsheets (Taken 3/13/2024 1254)  Prevent/manage excess moisture: Cleanse incontinence/protect with barrier cream      The clinical goals for the shift include see care plan    Over the shift, the patient did  make progress toward the following goals.

## 2024-03-13 NOTE — CARE PLAN
The patient's goals for the shift include    Problem: Psychosocial Needs  Goal: Demonstrates ability to cope with hospitalization/illness  Outcome: Met     Problem: Skin  Goal: Prevent/manage excess moisture  3/13/2024 1816 by Nicole Augustine RN  Outcome: Met  3/13/2024 1254 by Nicole Augustine RN  Flowsheets (Taken 3/13/2024 1254)  Prevent/manage excess moisture: Cleanse incontinence/protect with barrier cream       The clinical goals for the shift include see care plan    Over the shift, the patient did make progress toward the following goals.

## 2024-03-14 ENCOUNTER — APPOINTMENT (OUTPATIENT)
Dept: RADIOLOGY | Facility: HOSPITAL | Age: 79
DRG: 811 | End: 2024-03-14
Payer: MEDICARE

## 2024-03-14 LAB
ANION GAP SERPL CALC-SCNC: 10 MMOL/L (ref 10–20)
BUN SERPL-MCNC: 43 MG/DL (ref 6–23)
CALCIUM SERPL-MCNC: 8.1 MG/DL (ref 8.6–10.3)
CHLORIDE SERPL-SCNC: 110 MMOL/L (ref 98–107)
CO2 SERPL-SCNC: 24 MMOL/L (ref 21–32)
CREAT SERPL-MCNC: 2 MG/DL (ref 0.5–1.3)
EGFRCR SERPLBLD CKD-EPI 2021: 33 ML/MIN/1.73M*2
ERYTHROCYTE [DISTWIDTH] IN BLOOD BY AUTOMATED COUNT: 15.6 % (ref 11.5–14.5)
GLUCOSE BLD MANUAL STRIP-MCNC: 116 MG/DL (ref 74–99)
GLUCOSE BLD MANUAL STRIP-MCNC: 116 MG/DL (ref 74–99)
GLUCOSE BLD MANUAL STRIP-MCNC: 122 MG/DL (ref 74–99)
GLUCOSE BLD MANUAL STRIP-MCNC: 128 MG/DL (ref 74–99)
GLUCOSE BLD MANUAL STRIP-MCNC: 159 MG/DL (ref 74–99)
GLUCOSE SERPL-MCNC: 124 MG/DL (ref 74–99)
HCT VFR BLD AUTO: 25.8 % (ref 41–52)
HGB BLD-MCNC: 7.8 G/DL (ref 13.5–17.5)
INR PPP: 1.3 (ref 0.9–1.1)
MAGNESIUM SERPL-MCNC: 1.89 MG/DL (ref 1.6–2.4)
MCH RBC QN AUTO: 29.2 PG (ref 26–34)
MCHC RBC AUTO-ENTMCNC: 30.2 G/DL (ref 32–36)
MCV RBC AUTO: 97 FL (ref 80–100)
NRBC BLD-RTO: 0 /100 WBCS (ref 0–0)
PLATELET # BLD AUTO: 181 X10*3/UL (ref 150–450)
POTASSIUM SERPL-SCNC: 4.1 MMOL/L (ref 3.5–5.3)
PROTHROMBIN TIME: 14.9 SECONDS (ref 9.8–12.8)
RBC # BLD AUTO: 2.67 X10*6/UL (ref 4.5–5.9)
SODIUM SERPL-SCNC: 140 MMOL/L (ref 136–145)
WBC # BLD AUTO: 5.8 X10*3/UL (ref 4.4–11.3)

## 2024-03-14 PROCEDURE — 2500000002 HC RX 250 W HCPCS SELF ADMINISTERED DRUGS (ALT 637 FOR MEDICARE OP, ALT 636 FOR OP/ED): Performed by: NURSE PRACTITIONER

## 2024-03-14 PROCEDURE — 2500000004 HC RX 250 GENERAL PHARMACY W/ HCPCS (ALT 636 FOR OP/ED): Performed by: NURSE PRACTITIONER

## 2024-03-14 PROCEDURE — 2500000004 HC RX 250 GENERAL PHARMACY W/ HCPCS (ALT 636 FOR OP/ED): Performed by: INTERNAL MEDICINE

## 2024-03-14 PROCEDURE — 80048 BASIC METABOLIC PNL TOTAL CA: CPT | Performed by: NURSE PRACTITIONER

## 2024-03-14 PROCEDURE — 83735 ASSAY OF MAGNESIUM: CPT | Performed by: NURSE PRACTITIONER

## 2024-03-14 PROCEDURE — 1200000002 HC GENERAL ROOM WITH TELEMETRY DAILY

## 2024-03-14 PROCEDURE — 85610 PROTHROMBIN TIME: CPT | Performed by: NURSE PRACTITIONER

## 2024-03-14 PROCEDURE — 85027 COMPLETE CBC AUTOMATED: CPT | Performed by: NURSE PRACTITIONER

## 2024-03-14 PROCEDURE — 2500000001 HC RX 250 WO HCPCS SELF ADMINISTERED DRUGS (ALT 637 FOR MEDICARE OP): Performed by: NURSE PRACTITIONER

## 2024-03-14 PROCEDURE — 94640 AIRWAY INHALATION TREATMENT: CPT

## 2024-03-14 PROCEDURE — 73070 X-RAY EXAM OF ELBOW: CPT | Mod: RT

## 2024-03-14 PROCEDURE — 71045 X-RAY EXAM CHEST 1 VIEW: CPT

## 2024-03-14 PROCEDURE — 71045 X-RAY EXAM CHEST 1 VIEW: CPT | Performed by: RADIOLOGY

## 2024-03-14 PROCEDURE — 92610 EVALUATE SWALLOWING FUNCTION: CPT | Mod: GN | Performed by: SPEECH-LANGUAGE PATHOLOGIST

## 2024-03-14 PROCEDURE — 82947 ASSAY GLUCOSE BLOOD QUANT: CPT

## 2024-03-14 PROCEDURE — C9113 INJ PANTOPRAZOLE SODIUM, VIA: HCPCS | Performed by: NURSE PRACTITIONER

## 2024-03-14 PROCEDURE — 73070 X-RAY EXAM OF ELBOW: CPT | Mod: RIGHT SIDE | Performed by: RADIOLOGY

## 2024-03-14 RX ORDER — FUROSEMIDE 10 MG/ML
40 INJECTION INTRAMUSCULAR; INTRAVENOUS ONCE
Status: COMPLETED | OUTPATIENT
Start: 2024-03-14 | End: 2024-03-14

## 2024-03-14 RX ORDER — MAGNESIUM SULFATE HEPTAHYDRATE 40 MG/ML
2 INJECTION, SOLUTION INTRAVENOUS ONCE
Status: COMPLETED | OUTPATIENT
Start: 2024-03-14 | End: 2024-03-14

## 2024-03-14 RX ADMIN — BRIMONIDINE TARTRATE 1 DROP: 2 SOLUTION OPHTHALMIC at 21:10

## 2024-03-14 RX ADMIN — ATORVASTATIN CALCIUM 80 MG: 80 TABLET, FILM COATED ORAL at 22:43

## 2024-03-14 RX ADMIN — INSULIN LISPRO 1 UNITS: 100 INJECTION, SOLUTION INTRAVENOUS; SUBCUTANEOUS at 21:40

## 2024-03-14 RX ADMIN — Medication 5 DROP: at 09:05

## 2024-03-14 RX ADMIN — FUROSEMIDE 40 MG: 10 INJECTION, SOLUTION INTRAMUSCULAR; INTRAVENOUS at 09:04

## 2024-03-14 RX ADMIN — MAGNESIUM SULFATE HEPTAHYDRATE 2 G: 40 INJECTION, SOLUTION INTRAVENOUS at 05:28

## 2024-03-14 RX ADMIN — PANTOPRAZOLE SODIUM 40 MG: 40 INJECTION, POWDER, FOR SOLUTION INTRAVENOUS at 12:29

## 2024-03-14 RX ADMIN — SODIUM BICARBONATE 650 MG: 650 TABLET ORAL at 21:06

## 2024-03-14 RX ADMIN — BRIMONIDINE TARTRATE 1 DROP: 2 SOLUTION OPHTHALMIC at 09:05

## 2024-03-14 RX ADMIN — Medication 10 ML: at 12:29

## 2024-03-14 RX ADMIN — GABAPENTIN 100 MG: 100 CAPSULE ORAL at 21:06

## 2024-03-14 RX ADMIN — Medication 5 DROP: at 21:11

## 2024-03-14 RX ADMIN — CEFTRIAXONE SODIUM 2 G: 2 INJECTION, SOLUTION INTRAVENOUS at 17:29

## 2024-03-14 RX ADMIN — IPRATROPIUM BROMIDE AND ALBUTEROL SULFATE 3 ML: 2.5; .5 SOLUTION RESPIRATORY (INHALATION) at 07:38

## 2024-03-14 RX ADMIN — PANTOPRAZOLE SODIUM 40 MG: 40 INJECTION, POWDER, FOR SOLUTION INTRAVENOUS at 21:06

## 2024-03-14 RX ADMIN — CARBIDOPA AND LEVODOPA 1 TABLET: 25; 100 TABLET ORAL at 21:06

## 2024-03-14 RX ADMIN — Medication 10 ML: at 21:00

## 2024-03-14 ASSESSMENT — PAIN SCALES - GENERAL
PAINLEVEL_OUTOF10: 4
PAINLEVEL_OUTOF10: 0 - NO PAIN

## 2024-03-14 ASSESSMENT — PAIN - FUNCTIONAL ASSESSMENT
PAIN_FUNCTIONAL_ASSESSMENT: 0-10

## 2024-03-14 NOTE — PROGRESS NOTES
Occupational Therapy                 Therapy Communication Note    Patient Name: Vamsi Benson  MRN: [id number]  Today's Date: 3/14/2024     Discipline: Occupational Therapy    Missed Visit Reason: Missed Visit Reason: Parent refused (wants to sleep)    Missed Time: Attempt    Comment:

## 2024-03-14 NOTE — NURSING NOTE
Patient a&ox4. Patient denies sob throughout shift. Patient refused oral meds this shift. Speech therapy saw patient this shift, patient placed on nectar liquids and minced moist food. Patient to have modified barium swallow tomorrow.

## 2024-03-14 NOTE — PROGRESS NOTES
Internal Medicine Progress Note    Patient Name: Vamsi Benson          MRN: [id number]  Today's Date: March 14, 2024          Attending: Rufino Osman MD    Subjective:  Patient was seen and examined at bedside.    Review Of Systems:  CARDIOVASCULAR: Negative for chest pain  RESPIRATORY: Positive for shortness of breath  GI: No nausea, vomiting, or diarrhea    Objective:  Vitals:    03/14/24 0529 03/14/24 0600 03/14/24 0732 03/14/24 0800   BP:    162/76   BP Location:       Patient Position:       Pulse:    56   Resp:    18   Temp:    36.1 °C (97 °F)   TempSrc:       SpO2:   97% 96%   Weight: 89.9 kg (198 lb 3.1 oz) 88.9 kg (195 lb 15.8 oz)     Height:               Physical Exam:   General appearance: Alert, in no acute distress  Lungs: Diffuse crackles  Heart: RRR without murmur, gallop, or rubs.  Abdomen: Soft, non-tender. Bowel sounds normal.  Extremities: +3 bilateral lower extremity edema  Neuro: Alert oriented x3, no focal deficit.    Labs:  Results for orders placed or performed during the hospital encounter of 03/08/24 (from the past 24 hour(s))   POCT GLUCOSE   Result Value Ref Range    POCT Glucose 126 (H) 74 - 99 mg/dL   Protime-INR   Result Value Ref Range    Protime 15.3 (H) 9.8 - 12.8 seconds    INR 1.4 (H) 0.9 - 1.1   Hepatic function panel   Result Value Ref Range    Albumin 2.1 (L) 3.4 - 5.0 g/dL    Bilirubin, Total 0.3 0.0 - 1.2 mg/dL    Bilirubin, Direct 0.1 0.0 - 0.3 mg/dL    Alkaline Phosphatase 50 33 - 136 U/L    ALT <3 (L) 10 - 52 U/L    AST 11 9 - 39 U/L    Total Protein 6.3 (L) 6.4 - 8.2 g/dL   POCT GLUCOSE   Result Value Ref Range    POCT Glucose 132 (H) 74 - 99 mg/dL   POCT GLUCOSE   Result Value Ref Range    POCT Glucose 132 (H) 74 - 99 mg/dL   Magnesium   Result Value Ref Range    Magnesium 1.89 1.60 - 2.40 mg/dL   CBC   Result Value Ref Range    WBC 5.8 4.4 - 11.3 x10*3/uL    nRBC 0.0 0.0 - 0.0 /100 WBCs    RBC 2.67 (L) 4.50 - 5.90 x10*6/uL    Hemoglobin 7.8 (L) 13.5 - 17.5 g/dL     Hematocrit 25.8 (L) 41.0 - 52.0 %    MCV 97 80 - 100 fL    MCH 29.2 26.0 - 34.0 pg    MCHC 30.2 (L) 32.0 - 36.0 g/dL    RDW 15.6 (H) 11.5 - 14.5 %    Platelets 181 150 - 450 x10*3/uL   Basic metabolic panel   Result Value Ref Range    Glucose 124 (H) 74 - 99 mg/dL    Sodium 140 136 - 145 mmol/L    Potassium 4.1 3.5 - 5.3 mmol/L    Chloride 110 (H) 98 - 107 mmol/L    Bicarbonate 24 21 - 32 mmol/L    Anion Gap 10 10 - 20 mmol/L    Urea Nitrogen 43 (H) 6 - 23 mg/dL    Creatinine 2.00 (H) 0.50 - 1.30 mg/dL    eGFR 33 (L) >60 mL/min/1.73m*2    Calcium 8.1 (L) 8.6 - 10.3 mg/dL   Protime-INR   Result Value Ref Range    Protime 14.9 (H) 9.8 - 12.8 seconds    INR 1.3 (H) 0.9 - 1.1   POCT GLUCOSE   Result Value Ref Range    POCT Glucose 116 (H) 74 - 99 mg/dL       Medications:  Scheduled medications  atorvastatin, 80 mg, oral, Nightly  brimonidine, 1 drop, Right Eye, BID  carbamide peroxide, 5 drop, Left Ear, BID  carbidopa-levodopa, 1 tablet, oral, BID  cefTRIAXone, 2 g, intravenous, q24h  cholecalciferol, 4,000 Units, oral, Daily  furosemide, 40 mg, intravenous, Once  [Held by provider] furosemide, 20 mg, oral, q48h  gabapentin, 100 mg, oral, BID  [Held by provider] glipiZIDE, 2.5 mg, oral, BID AC  insulin lispro, 0-5 Units, subcutaneous, Before meals & nightly  [Held by provider] insulin lispro, 4 Units, subcutaneous, Before meals & nightly  lidocaine, 5 mL, infiltration, Once  metoprolol succinate XL, 50 mg, oral, Daily  pantoprazole, 40 mg, intravenous, BID  sodium bicarbonate, 650 mg, oral, BID  sodium chloride 0.9%, 10 mL, intravenous, BID  thiamine, 100 mg, oral, Daily      Continuous medications     PRN medications  PRN medications: acetaminophen, dextrose 10 % in water (D10W), dextrose, glucagon, ipratropium-albuteroL, melatonin, oxygen, polyethylene glycol      Assessment/Plan:  Principal Problem:    Gastrointestinal hemorrhage with melena  Continue PPI p.o. 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)  Sliding scale insulin      FRANCIE (acute kidney injury) (CMS/HCC)  Serum creatinine 2.00  Patient was placed on Lasix 20 mg every 48 hours by nephrology, we will hold today's dose since he is getting 40 mg  Nephrology is following      Metabolic acidosis  Resolved  Continue current medications      Shortness of breath  Patient complains of shortness of breath, chest x-ray showed pulmonary edema   We will give patient 1 dose of Lasix 40 mg      Discussed with patient, RN    Rufino Osman MD   Date: 03/14/24  Time: 8:42 AM    This note was partially created using voice recognition software and is inherently subject to errors including those of syntax and \"sound-alike\" substitutions which may escape proofreading. In such instances, original meaning may be extrapolated by contextual derivation  "

## 2024-03-14 NOTE — PROGRESS NOTES
03/14/24 0936   Discharge Planning   Home or Post Acute Services Post acute facilities (Rehab/SNF/etc)   Type of Post Acute Facility Services Skilled nursing   Patient expects to be discharged to: Pending SNF FOC     SW to follow up with pt's son for FOC. Per palliative note, plan remains SNF.

## 2024-03-14 NOTE — NURSING NOTE
Continued code status discussion this morning with pt at bedside. Pt states that he would like to change code status to DNAR CCA / DNI at this time. We reviewed once again what this means and pt remains in agreement. Pts son also called this writer requesting pt return to [facility] for therapy, relayed to the TCC and SW for discharge planning.   Plan to refer to [facility] for palliative services.  NP confirmed code status change with pt.     No further needs,   Will sign off at this time

## 2024-03-14 NOTE — CARE PLAN
Problem: Skin  Goal: Participates in plan/prevention/treatment measures  3/14/2024 1748 by Nicole Augustine RN  Outcome: Met  3/14/2024 1116 by Nicole Augustine RN  Flowsheets (Taken 3/14/2024 1116)  Participates in plan/prevention/treatment measures: Elevate heels       The clinical goals for the shift include see care plan    Over the shift, the patient did make progress toward the following goals.

## 2024-03-14 NOTE — PROGRESS NOTES
"INPATIENT PROGRESS NOTES    PATIENT NAME: Vamsi Benson    MRN: [id number]  SERVICE DATE:  3/14/2024   SERVICE TIME:  12:45 PM    SIGNATURE: Aneudy Raymond MD      SUBJECTIVE  Afebrile  No events overnight       OBJECTIVE  PHYSICAL EXAM:   Patient Vitals for the past 24 hrs:   BP Temp Temp src Pulse Resp SpO2 Weight   03/14/24 0900 144/66 -- -- -- -- -- --   03/14/24 0800 162/76 36.1 °C (97 °F) -- 56 18 96 % --   03/14/24 0732 -- -- -- -- -- 97 % --   03/14/24 0600 -- -- -- -- -- -- 88.9 kg (195 lb 15.8 oz)   03/14/24 0529 -- -- -- -- -- -- 89.9 kg (198 lb 3.1 oz)   03/14/24 0400 160/73 35.9 °C (96.6 °F) Temporal 79 16 96 % --   03/14/24 0000 150/65 36.4 °C (97.5 °F) Temporal 73 17 99 % --   03/13/24 2000 161/80 36.2 °C (97.2 °F) Temporal 69 17 98 % --   03/13/24 1550 175/83 36.4 °C (97.5 °F) Tympanic 70 18 99 % --         Gen: NAD  Neck: symmetric, no mass  Cardiovascular: RRR  Respiratory: No distress   GI: Abd soft, nontender, non-distended  Extremities: no leg edema  Skin: Warm and dry.      Labs:  Lab Results   Component Value Date    WBC 5.8 03/14/2024    HGB 7.8 (L) 03/14/2024    HCT 25.8 (L) 03/14/2024    MCV 97 03/14/2024     03/14/2024     Lab Results   Component Value Date    GLUCOSE 124 (H) 03/14/2024    CALCIUM 8.1 (L) 03/14/2024     03/14/2024    K 4.1 03/14/2024    CO2 24 03/14/2024     (H) 03/14/2024    BUN 43 (H) 03/14/2024    CREATININE 2.00 (H) 03/14/2024   ESR: --No results found for: \"SEDRATE\"  Lab Results   Component Value Date    CRP 6.16 (H) 03/09/2024     Lab Results   Component Value Date    ALT <3 (L) 03/13/2024    AST 11 03/13/2024    ALKPHOS 50 03/13/2024    BILITOT 0.3 03/13/2024         DATA:   Diagnostic tests reviewed for today's visit:    Labs this admission reviewed  Imagings this admission reviewed  Cultures: Reviewed        ASSESSMENT :   -Recent right hand abscess with tenosynovitis status post I&D on 2/23 at [facility]  -Recent group A strep " bacteremia  -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  Reported receiving 2 doses of IM penicillin as scheduled for the third dose on 3/11  -Acute anemia  -History of A-fib, renal failure, hypertension     PLAN:  -Continue ceftriaxone 2 g IV every 24 hours through 3/27 and follow-up with ID at [facility]  -Tolerating antibiotics with no side effects.  LFTs within normal limit  -Received third dose of weekly IM penicillin on 3/11 to complete the course of latent syphilis  -Repeat RPR in 3 to 6 months  -Positive urine culture for Candida albicans without symptoms likely colonization    The plan was discussed with the pharmacy team    Aneudy Raymond MD.   Infectious Diseases Attending

## 2024-03-14 NOTE — PROGRESS NOTES
INPATIENT NEPHROLOGY CONSULT PROGRESS NOTE      Patient Name: Vamsi Benson MRN: [id number]  DATE of SERVICE: March 13, 2024  TIME of SERVICE: 10:40 AM  CONSULTING SERVICE: Nephrology    REASON for CONSULT: FRANCIE    SUBJECTIVE:  Seen and evaluated at bedside, continues to endorse weakness.  Oral intake remains poor.  Tolerating clear liquid.  Renal parameters improving serum creatinine down to 2 mg deciliter BUN down to 46 and GFR up to 32.  Electrolytes within range    SUMMARY OF STAY:  Mr. Benson is a 78-year-old male who presented to [facility] March 8, 2024 for evaluation of GI bleed complicated with acute kidney injury.  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  Recent evaluation of right upper extremity swelling was negative for DVT.  History of GI bleed status post EGD March 4, 2024 which demonstrated 3 nonbleeding ulcers in swelling of gastric outlet.  Patient presented to [facility] from [facility] for evaluation of acute blood loss anemia with drop in hemoglobin to 7 mg/dL associated with melena for 2 to 3 days prior to admission in addition to poor oral intake for 4 days.  Patient was found to be on Lovenox 100 mg subcu daily and Coumadin 2.5 mg p.o. daily at the facility. Labs demonstrated no acute kidney injury with a creatinine up to 2.3 mg deciliter from baseline creatinine of 1.2 mg deciliter, BUN up to 65,  metabolic acidosis bicarb of 19, hyperchloremia with a chloride of 112, hyperkalemia with potassium of 5, anemia with hemoglobin 7 mg deciliter.    ASSESSMENT:  Acute kidney injury superimposed on chronic kidney disease stage II:  Acute kidney injury likely hemodynamically mediated which likely evolved into ATN the setting of acute blood loss anemia, acute GI bleed, recent to group a strep bacteremia requiring IV ceftriaxone.  -- Serum creatinine 2.3 mg deciliter with a EGFR of 28 mill per minute per 1.73 m2  -- Chronic urinary retention: Ramirez catheter in place with dark concentrated urine.   -- Urine culture   Urine Culture >100,000 Candida glabrata           Volume: Euvolemic however with tendency to third space, hypoalbuminemia with albumin of 2  bilateral pleural effusion.   --Plan of care discussed with Dr. Weldon, tentative plan to target net even fluid balance for now.      Hyperkalemia: Due to FRANCIE, cellular shift in the setting of acidosis  Improved     Metabolic acidosis: Bicarb of 19  Oral sodium bicarbonates 650 mg p.o. 3 times daily     Acute blood loss anemia: Hemoglobin 7 mg/dL  --Acute GI bleed  -- Blood transfusion for hemoglobin less than 7     Concern for urinary tract infection:  Chronic indwelling Ramirez catheter in place.  Urine culture in process.      Recent right hand abscess status post I&D February 23.  Receiving IV ceftriaxone infectious disease following, appreciate recommendations.   Recent group A streptococcus bacteremia.  Latent syphilis     Atrial fibrillation managed with anticoagulation and metoprolol     Associated condition: Parkinsonism, vascular dementia, diabetes     Plan:  Acute kidney injury in the setting of acute blood loss anemia, recent bacteremia, prolonged antibiotic use, suspected urinary tract infection:     -- Acute kidney injury improving.  -- To switch IV Lasix to oral 20 mg p.o. every 48 hours  -- urine cx candida, to change ramirez catheter   --GI bleed s/p 1 unit of  packed RBCs  -- Acute hypoxia requiring 2 L of oxygen, oxygen requirement stable.   -- Hypoalbuminemia, malnutrition will cont protein supplements  -- Bilateral pleural effusion   -- Oral sodium bicarbonates 650 mg p.o. 3 times daily.   -- Medication reviewed, renally dosed to continue to adjust medication based on GFR.      Will follow, thank you!    Plan of care discussed with patient and bedside nurse.     Medications:    Current Facility-Administered Medications:     acetaminophen (Tylenol) tablet 650 mg, 650 mg, oral, q4h PRN, BRENDA Navarro    atorvastatin (Lipitor) tablet 80 mg, 80 mg, oral, Nightly, BRENDA Navarro, 80 mg at 03/12/24 2114    brimonidine (AlphaGAN) 0.2 % ophthalmic solution 1 drop, 1 drop, Right Eye, BID, BRENDA Navarro, 1 drop at 03/13/24 1021    carbamide peroxide (Debrox) 6.5 % otic solution 5 drop, 5 drop, Left Ear, BID, BRENDA Landeros, 5 drop at 03/13/24 1021    carbidopa-levodopa (Sinemet)  mg per tablet 1 tablet, 1 tablet, oral, BID, BRENDA Navarro, 1 tablet at 03/13/24 1022    cefTRIAXone (Rocephin) 2 g IV in dextrose 5% 50 mL, 2 g, intravenous, q24h, BRENDA Navarro, Last Rate: 100 mL/hr at 03/13/24 1730, 2 g at 03/13/24 1730    cholecalciferol (Vitamin D-3) tablet 4,000 Units, 4,000 Units, oral, Daily, BRENDA Navarro, 4,000 Units at 03/13/24 1022    dextrose 10 % in water (D10W) infusion, 0.3 g/kg/hr, intravenous, Once PRN, BRENDA Moulton    dextrose 50 % injection 25 g, 25 g, intravenous, q15 min PRN, BRENDA Moulton    furosemide (Lasix) tablet 20 mg, 20 mg, oral, Daily, Dylon Marin MD, 20 mg at 03/13/24 1023    gabapentin (Neurontin) capsule 100 mg, 100 mg, oral, BID, BRENDA Navarro, 100 mg at 03/13/24 1023    [Held by provider] glipiZIDE (Glucotrol) tablet 2.5 mg, 2.5 mg, oral, BID AC, BRENDA Navarro    glucagon (Glucagen) injection 1 mg, 1 mg,  intramuscular, q15 min PRN, BRENDA Moulton    insulin lispro (HumaLOG) injection 0-5 Units, 0-5 Units, subcutaneous, Before meals & nightly, BRENDA Moulton, 1 Units at 03/11/24 2117    [Held by provider] insulin lispro (HumaLOG) injection 4 Units, 4 Units, subcutaneous, Before meals & nightly, BRENDA Navarro    ipratropium-albuteroL (Duo-Neb) 0.5-2.5 mg/3 mL nebulizer solution 3 mL, 3 mL, nebulization, q2h PRN, BRENDA Navarro    lidocaine PF (Xylocaine) 10 mg/mL (1 %) injection 50 mg, 5 mL, infiltration, Once, BRENDA Landeros    melatonin tablet 3 mg, 3 mg, oral, Daily PRN, BRENDA Navarro    metoprolol succinate XL (Toprol-XL) 24 hr tablet 50 mg, 50 mg, oral, Daily, BRENDA Navarro, 50 mg at 03/12/24 0921    oxygen (O2) therapy, , inhalation, Continuous PRN - O2/gases, BRENDA Navarro, 2 L/min at 03/11/24 2000    pantoprazole (ProtoNix) injection 40 mg, 40 mg, intravenous, BID, BRENDA Hendrix, 40 mg at 03/13/24 1019    polyethylene glycol (Glycolax, Miralax) packet 17 g, 17 g, oral, Daily PRN, BRENDA Navarro    sodium bicarbonate tablet 650 mg, 650 mg, oral, BID, Dylon Marin MD, 650 mg at 03/13/24 1022    sodium chloride 0.9% flush 10 mL, 10 mL, intravenous, BID, BRENDA Navarro, 10 mL at 03/13/24 1048    thiamine (Vitamin B-1) tablet 100 mg, 100 mg, oral, Daily, BRENDA Navarro, 100 mg at 03/13/24 1022    PERTINENT ROS:  GENERAL:  positive for fatigue, poor appetite.  No fever/chills  RESPIRATORY:  positive for shortness of breath.  Negative for cough, wheezing.  CARDIOVASCULAR:   Negative for chest pain or palpitation.  GI:  Negative for abdominal pain, diarrhea, heartburn, nausea, vomiting  : Indwelling Conner catheter in place    Physical Exam:  Vital signs in last 24 hours:  Temp:  [36.2 °C (97.2 °F)-36.4 °C (97.5 °F)] 36.4 °C (97.5 °F)  Heart Rate:  [66-72] 70  Resp:  [16-18]  18  BP: (148-175)/(68-84) 175/83    General: Awake, cooperative, not in acute distress, right central catheter in place with no signs of infection at exit site.  HEENT:  NCAT,  mucous membranes moist and pink  NECK:  Elevated JVD, no carotid bruit, supple, no cervical mass or thyromegaly  LUNGS;  Diminished breath sounds, fine Rales  CV:  Distant, regular rate and rhythm, + murmurs  ABDOMEN:  abdomen soft, nontender, ongoing GI bleed  EDEMA:  trace lower extremity edema/dependent edema  SKIN: Right breast with dressing in place  : Indwelling Conner catheter in place    Intake/Output last 3 shifts:  I/O last 3 completed shifts:  In: 530 (6 mL/kg) [P.O.:480; IV Piggyback:50]  Out: 2075 (23.5 mL/kg) [Urine:1875 (0.6 mL/kg/hr); Stool:200]  Weight: 88.3 kg     DATA:  Diagnotic tests reviewed for Todays visit:  Results from last 7 days   Lab Units 03/13/24  0523   WBC AUTO x10*3/uL 5.7   RBC AUTO x10*6/uL 2.65*   HEMOGLOBIN g/dL 7.8*   HEMATOCRIT % 25.6*       Results from last 7 days   Lab Units 03/13/24  1555 03/13/24  0523 03/10/24  0651 03/09/24  0555   SODIUM mmol/L  --  142   < > 140   POTASSIUM mmol/L  --  4.4   < > 5.0   CHLORIDE mmol/L  --  112*   < > 112*   CO2 mmol/L  --  22   < > 19*   BUN mg/dL  --  46*   < > 65*   CREATININE mg/dL  --  2.06*   < > 2.32*   CALCIUM mg/dL  --  8.1*   < > 8.0*   PHOSPHORUS mg/dL  --   --   --  5.5*   MAGNESIUM mg/dL  --  1.89   < > 2.10   BILIRUBIN TOTAL mg/dL 0.3  --   --   --    ALT U/L <3*  --   --   --    AST U/L 11  --   --   --     < > = values in this interval not displayed.       Results from last 7 days   Lab Units 03/09/24  0421   COLOR U  Yellow   APPEARANCE U  Hazy*   PH U  5.0   SPEC GRAV UR  1.029   PROTEIN U mg/dL 100 (2+)*   BLOOD UR  LARGE (3+)*   NITRITE U  NEGATIVE   WBC UR /HPF >50*       IMAGING: CXR reviewed in  images      SIGNATURE: Dylon Marin MD  Nephrology and Hypertension  [address]  Office phone: [phone]  FAX:  [phone]    This note was partially generated using the Dragon voice recognition system, and there may be some incorrect words, spelling's and punctuation that were not noted in checking the note before saving.

## 2024-03-14 NOTE — PROGRESS NOTES
Speech-Language Pathology    SLP Adult Inpatient Speech-Language Pathology Clinical Swallow Evaluation    Patient Name: Vamsi Benson  MRN: [id number]  Today's Date: 3/14/2024   Time Calculation  Start Time: 1208  Stop Time: 1220  Time Calculation (min): 12 min         Current Problem:   1. Gastrointestinal hemorrhage with melena        2. Subtherapeutic international normalized ratio (INR)        3. Normocytic normochromic anemia        4. Bilateral pleural effusion        5. Extensor tenosynovitis of right wrist  Vascular US upper extremity venous duplex right    Vascular US upper extremity venous duplex right      6. Swelling of extremity, right  Vascular US upper extremity venous duplex right    Vascular US upper extremity venous duplex right      7. Uremic acidosis        8. Pleural effusion, bilateral  Transthoracic Echo (TTE) Complete    Transthoracic Echo (TTE) Complete      9. FRANCIE (acute kidney injury) (CMS/HCC)  Transthoracic Echo (TTE) Complete    Transthoracic Echo (TTE) Complete      10. Coronary artery disease, unspecified vessel or lesion type, unspecified whether angina present, unspecified whether native or transplanted heart  Transthoracic Echo (TTE) Complete    Transthoracic Echo (TTE) Complete      11. Atrial fibrillation, unspecified type (CMS/HCC)  Transthoracic Echo (TTE) Complete    Transthoracic Echo (TTE) Complete          Recommendations:  Risk for Aspiration: Yes  Additional Recommendations: Modified barium swallow study, Dysphagia treatment, Other (Comment) (ongoing ST at SNF, targeting dysphagia and voice)  Solid Diet Recommendations : Minced & moist/ground (IDDSI Level 5)  Liquid Diet Recommendations: Nectar thick/mildly thick (IDDS Level 2)  Compensatory Swallowing Strategies: Upright 90 degrees as possible for all oral intake, Remain upright for 20-30 minutes after meals, Full supervision with meals, One to one assist with meals, Small bites/sips, Eat/feed slowly, Other (Comment)  "(STRICT oral care)  Medication Administration Recommendations: Whole, With Pureed, Other (Comment) (one at a time)  Follow up treatments: Diet tolerance monitoring, Patient/family education, Other (Comment) (MBSS)      Assessment:  Assessment Results:   Patient presents with concern for oropharyngeal dysphagia upon completion of clinical swallow evaluation at bedside this date. Examination of oral mechanism was remarkable for decreased lingual strength and labial range of motion, as well as weak cough and hypophonic voice. Patient did not pass Colbert Swallow Protocol (3oz water challenge) due to stopping prior to completion. Patient tolerated trials of thin liquid via straw and puree without overt s/s of aspiration; however, patient refused all other trials offered, stating that he felt tired and would prefer to continue assessment another time. Education provided, and patient continued to refuse. Patient was agreeable to ST recommendation for modified barium swallow to further assess patient's swallowing function and determine appropriate diet consistencies.    Of note, ST spoke with RN at patient's facility via phone, and RN confirmed that patient was on a modified diet (\"texture modified\" foods and nectar/mildly thick liquids) in his short time at facility, and that he was seen by ST at facility but no further testing (I.e., MBSS, FEES) was completed.    Although patient did not demonstrate overt s/s of aspiration with thin liquids during this assessment, RN reported patient coughing with liquids earlier this date, and patient himself endorses coughing/choking with thin liquids. Recommend resuming modified diet textures recommended at facility, with MBSS warranted for further assessment. ST to continue to follow during inpatient stay, and will update recommendations as indicated per MBSS.    Prognosis: Good  Treatment Provided: No  Barriers: Comorbidities      Plan:  Inpatient/Swing Bed or Outpatient: " Inpatient  Treatment/Interventions: Complete MBSS, Diet recommendations, Patient/family education, Pharyngeal exercises  SLP Plan: Skilled SLP  SLP Frequency: 2x per week  Duration: Current admission  SLP Discharge Recommendations: Continue skilled SLP services at the next level of care  Next Treatment Priority: MBSS  Discussed POC: Patient, Nursing, Other (Comment) (NP)  Patient/Caregiver Agreeable: Yes    Goals (established 3/14/24):  1. Patient will tolerate baseline diet absent of overt s/s of aspiration in 90% of observed therapeutic trials.  2. Patient will implement safe swallowing strategies to reduce risk of aspiration in 90% of trials given caregiver assistance/cueing as needed.  3. Patient will complete modified barium swallow study (MBSS) for objective assessment of oropharyngeal swallow function, to assess for aspiration, and to guide further recommendations and treatment plan.      Subjective   Current Problem:  Patient reports coughing/choking with liquids at times. Patient was evaluated by speech therapist during his sort time at facility (2 days prior to hospital admission), but modified barium swallow study/FEES has not previously been completed.      General Visit Information:  Patient Class: Inpatient  Living Environment: Nursing home (skilled/long-term)  Caregiver Feedback: RN reporting patient had been coughing with thin liquid this date.  Ordering Physician: Sveta Lyman, CARLO-CNP  Past Medical History Relevant to Rehab: A-fib on Coumadin, HTN, HLD, CAD & MI s/p CABG x5 (2016) and PTCA, IDDM II, Parkinson's, GERD, embolic CVA, late latent syphilis, and sepsis  r/t right hand cellulitis with extensor tenosynovitis c/b BSI mediated by GAS, MRSA s/p surgical debridement on 2/23/2024 at [facility] on IV Rocephin 2 g every 24 hours via central line in right chest-stop date 3/27/2024, RUE swelling s/p US negative DVT on 3/1/2024, 2/21/2024 blood cultures positive for group A strep bacteremia tx  "with Vancomycin initially, late latent syphilis treated with IM Bicillin weekly x 3 weeks (3/4/2024-second dose), GI bleed s/p EGD (3/4/2024) showing 3 nonbleeding ulcer and swelling of gastric outlet  Prior Level of Function: Decreased function  Patient Seen During This Visit: Yes  Total Number of Visits : 1  Prior to Session Communication: Bedside nurse  Date of Onset: 24  Date of Order: 24  [employer] Diet: \"Texture modified\" diet and mildly thick liquids at facility  Current Diet : Regular solids and thin liquids  Dysphagia Diagnosis: Other (Comment) (suspected oropharyngeal dysphagia)        Objective     Baseline Assessment:  Respiratory Status: Oxygen via nasal cannula (2L O2)  History of Intubation: No  Behavior/Cognition: Alert, Other (comment) (generally cooperative, although refused some trials)  Patient Positioning: Upright in Bed  Baseline Vocal Quality: Dysphonic, Weak  Volitional Cough: Weak  Volitional Swallow: Within Functional Limits    Relevant Imaging:    Chest XR 3/14/24: \"IMPRESSION:  1.  Fairly severe pulmonary edema. However, underlying consolidated  infiltrate or pneumonia is not excluded.\"      Pain:  Pain Assessment: 0-10  Pain Score: 0 - No pain       Oral/Motor Assessment:  Oral Hygiene: Fair  Dentition: Adequate/Natural, Some Missing Teeth  Oral Motor: Impaired Function  Labial ROM: Other (Comment) (reduced bilaterally)  Lingual Strength: Reduced  Vocal Quality: Exceptions to WFL  Vocal Quality Impairment: Breathy, Hoarse, Weak  Apraxia: None present  Intelligibility: Intelligibility reduced  Intelligibility Ratin%-80%    Consistencies Trialed:  Consistencies Trialed: Yes  Consistencies Trialed: Thin (IDDSI Level 0) - Straw, Pureed/extremely thick (IDDSI Level 4)    Clinical Observations:  Patient Positioning: Upright in Bed  Was The 3 oz Swallow Protocol Completed: Yes, Other (Comment) (did not \"pass\" due to stopping prior to " completion)      Inpatient:    Education:  Learner patient   Barriers to Learning acuteness of illness barrier   Method demonstration; verbal   Education - Topic ST provided patient education regarding role of ST, purpose of assessment, clinical impressions, goals of treatment, and plan of care. Patient verbalized comprehension. Education will be reinforced. ST further coordinated with RN regarding recommendations and precautions per this assessment, with RN verbalizing understanding.     Outcome    Verbalized understanding and agreement;  needs review/reinforcement

## 2024-03-14 NOTE — CARE PLAN
EOS:    No acute events this shift.     Pt medicated as ordered.    -Pt with preference to take 3 medications at a time, RN prioitized important medications for initial 2150 med-pass with pt approached an hour later in attempts to administer remaining scheduled medications.     -All scheduled medications administered.    Pt with reported discomfort in right arm. Pt medicated for discomfort. RN to page  requesting X-ray for Right arm to monitor for fracture since pt has already had a venous duplex on this arm that resulted with no DVT's noted.    -Order for x-ray obtained.     New order for Lasix to be administered Q48 instead of daily clarified with  for next scheduled dose.    -Dose scheduled appropriately with message to pharmacy for future scheduling times for med.      made aware of pt with PVC's this PM shift via telemetry monitoring.  -Magnesium sulfate order received and administered to pt asap.     Labs drawn this AM     Dressing changed on Right hand at 0600 via order.     PT with bed alarm active at all times, call light within reach, safety maintained.       The patient's goals for the shift include      The clinical goals for the shift include see care plan      Problem: Psychosocial Needs  Goal: Collaborate with me, my family, and caregiver to identify my specific goals  Outcome: Progressing     Problem: Discharge Barriers  Goal: My discharge needs are met  Outcome: Progressing     Problem: Skin  Goal: Promote/optimize nutrition  Outcome: Progressing  Flowsheets (Taken 3/13/2024 2034)  Promote/optimize nutrition: Assist with feeding  Goal: Decreased wound size/increased tissue granulation at next dressing change  Outcome: Progressing  Flowsheets (Taken 3/13/2024 2034)  Decreased wound size/increased tissue granulation at next dressing change: Promote sleep for wound healing  Goal: Participates in plan/prevention/treatment measures  Outcome: Progressing  Flowsheets (Taken 3/13/2024  2034)  Participates in plan/prevention/treatment measures: Elevate heels  Goal: Prevent/minimize sheer/friction injuries  Outcome: Progressing  Flowsheets (Taken 3/13/2024 2034)  Prevent/minimize sheer/friction injuries: Use pull sheet  Goal: Promote skin healing  Outcome: Progressing  Flowsheets (Taken 3/13/2024 2034)  Promote skin healing:   Assess skin/pad under line(s)/device(s)   Protective dressings over bony prominences

## 2024-03-15 ENCOUNTER — APPOINTMENT (OUTPATIENT)
Dept: RADIOLOGY | Facility: HOSPITAL | Age: 79
DRG: 811 | End: 2024-03-15
Payer: MEDICARE

## 2024-03-15 LAB
ANION GAP SERPL CALC-SCNC: 11 MMOL/L (ref 10–20)
BNP SERPL-MCNC: 390 PG/ML (ref 0–99)
BUN SERPL-MCNC: 39 MG/DL (ref 6–23)
CALCIUM SERPL-MCNC: 8.1 MG/DL (ref 8.6–10.3)
CHLORIDE SERPL-SCNC: 108 MMOL/L (ref 98–107)
CO2 SERPL-SCNC: 26 MMOL/L (ref 21–32)
CREAT SERPL-MCNC: 1.89 MG/DL (ref 0.5–1.3)
EGFRCR SERPLBLD CKD-EPI 2021: 36 ML/MIN/1.73M*2
ERYTHROCYTE [DISTWIDTH] IN BLOOD BY AUTOMATED COUNT: 15.8 % (ref 11.5–14.5)
GLUCOSE BLD MANUAL STRIP-MCNC: 135 MG/DL (ref 74–99)
GLUCOSE BLD MANUAL STRIP-MCNC: 137 MG/DL (ref 74–99)
GLUCOSE BLD MANUAL STRIP-MCNC: 147 MG/DL (ref 74–99)
GLUCOSE SERPL-MCNC: 125 MG/DL (ref 74–99)
HCT VFR BLD AUTO: 26.1 % (ref 41–52)
HGB BLD-MCNC: 8.2 G/DL (ref 13.5–17.5)
INR PPP: 1.4 (ref 0.9–1.1)
MAGNESIUM SERPL-MCNC: 1.89 MG/DL (ref 1.6–2.4)
MCH RBC QN AUTO: 29.6 PG (ref 26–34)
MCHC RBC AUTO-ENTMCNC: 31.4 G/DL (ref 32–36)
MCV RBC AUTO: 94 FL (ref 80–100)
NRBC BLD-RTO: 0 /100 WBCS (ref 0–0)
PLATELET # BLD AUTO: 180 X10*3/UL (ref 150–450)
POTASSIUM SERPL-SCNC: 3.9 MMOL/L (ref 3.5–5.3)
PROTHROMBIN TIME: 15.3 SECONDS (ref 9.8–12.8)
RBC # BLD AUTO: 2.77 X10*6/UL (ref 4.5–5.9)
SODIUM SERPL-SCNC: 141 MMOL/L (ref 136–145)
STAPHYLOCOCCUS SPEC CULT: NORMAL
WBC # BLD AUTO: 5.8 X10*3/UL (ref 4.4–11.3)

## 2024-03-15 PROCEDURE — 71045 X-RAY EXAM CHEST 1 VIEW: CPT

## 2024-03-15 PROCEDURE — 74230 X-RAY XM SWLNG FUNCJ C+: CPT

## 2024-03-15 PROCEDURE — 80048 BASIC METABOLIC PNL TOTAL CA: CPT | Performed by: NURSE PRACTITIONER

## 2024-03-15 PROCEDURE — 82947 ASSAY GLUCOSE BLOOD QUANT: CPT

## 2024-03-15 PROCEDURE — 1200000002 HC GENERAL ROOM WITH TELEMETRY DAILY

## 2024-03-15 PROCEDURE — 85027 COMPLETE CBC AUTOMATED: CPT | Performed by: NURSE PRACTITIONER

## 2024-03-15 PROCEDURE — 83735 ASSAY OF MAGNESIUM: CPT | Performed by: NURSE PRACTITIONER

## 2024-03-15 PROCEDURE — 92611 MOTION FLUOROSCOPY/SWALLOW: CPT | Mod: GN | Performed by: INTERNAL MEDICINE

## 2024-03-15 PROCEDURE — 2500000001 HC RX 250 WO HCPCS SELF ADMINISTERED DRUGS (ALT 637 FOR MEDICARE OP): Performed by: INTERNAL MEDICINE

## 2024-03-15 PROCEDURE — 97530 THERAPEUTIC ACTIVITIES: CPT | Mod: GP,CQ

## 2024-03-15 PROCEDURE — 97530 THERAPEUTIC ACTIVITIES: CPT | Mod: GO,CO

## 2024-03-15 PROCEDURE — 71045 X-RAY EXAM CHEST 1 VIEW: CPT | Performed by: RADIOLOGY

## 2024-03-15 PROCEDURE — 97110 THERAPEUTIC EXERCISES: CPT | Mod: GP,CQ

## 2024-03-15 PROCEDURE — 2500000002 HC RX 250 W HCPCS SELF ADMINISTERED DRUGS (ALT 637 FOR MEDICARE OP, ALT 636 FOR OP/ED): Mod: MUE | Performed by: NURSE PRACTITIONER

## 2024-03-15 PROCEDURE — 2500000001 HC RX 250 WO HCPCS SELF ADMINISTERED DRUGS (ALT 637 FOR MEDICARE OP): Performed by: NURSE PRACTITIONER

## 2024-03-15 PROCEDURE — 97110 THERAPEUTIC EXERCISES: CPT | Mod: GO,CO

## 2024-03-15 PROCEDURE — 2500000004 HC RX 250 GENERAL PHARMACY W/ HCPCS (ALT 636 FOR OP/ED): Performed by: NURSE PRACTITIONER

## 2024-03-15 PROCEDURE — C9113 INJ PANTOPRAZOLE SODIUM, VIA: HCPCS | Performed by: NURSE PRACTITIONER

## 2024-03-15 PROCEDURE — 74230 X-RAY XM SWLNG FUNCJ C+: CPT | Performed by: STUDENT IN AN ORGANIZED HEALTH CARE EDUCATION/TRAINING PROGRAM

## 2024-03-15 PROCEDURE — 2500000004 HC RX 250 GENERAL PHARMACY W/ HCPCS (ALT 636 FOR OP/ED): Performed by: INTERNAL MEDICINE

## 2024-03-15 PROCEDURE — 85610 PROTHROMBIN TIME: CPT | Performed by: NURSE PRACTITIONER

## 2024-03-15 PROCEDURE — 83880 ASSAY OF NATRIURETIC PEPTIDE: CPT | Performed by: NURSE PRACTITIONER

## 2024-03-15 RX ORDER — WARFARIN 2.5 MG/1
2.5 TABLET ORAL DAILY
Status: DISCONTINUED | OUTPATIENT
Start: 2024-03-15 | End: 2024-03-19 | Stop reason: HOSPADM

## 2024-03-15 RX ORDER — PANTOPRAZOLE SODIUM 40 MG/1
40 TABLET, DELAYED RELEASE ORAL 2 TIMES DAILY
Status: DISCONTINUED | OUTPATIENT
Start: 2024-03-15 | End: 2024-03-19 | Stop reason: HOSPADM

## 2024-03-15 RX ORDER — FUROSEMIDE 10 MG/ML
40 INJECTION INTRAMUSCULAR; INTRAVENOUS ONCE
Status: COMPLETED | OUTPATIENT
Start: 2024-03-15 | End: 2024-03-15

## 2024-03-15 RX ORDER — GUAIFENESIN 600 MG/1
600 TABLET, EXTENDED RELEASE ORAL 2 TIMES DAILY
Status: DISCONTINUED | OUTPATIENT
Start: 2024-03-15 | End: 2024-03-19 | Stop reason: HOSPADM

## 2024-03-15 RX ADMIN — PANTOPRAZOLE SODIUM 40 MG: 40 TABLET, DELAYED RELEASE ORAL at 22:45

## 2024-03-15 RX ADMIN — ATORVASTATIN CALCIUM 80 MG: 80 TABLET, FILM COATED ORAL at 22:45

## 2024-03-15 RX ADMIN — SODIUM BICARBONATE 650 MG: 650 TABLET ORAL at 09:04

## 2024-03-15 RX ADMIN — Medication 5 DROP: at 09:05

## 2024-03-15 RX ADMIN — BRIMONIDINE TARTRATE 1 DROP: 2 SOLUTION OPHTHALMIC at 22:07

## 2024-03-15 RX ADMIN — GUAIFENESIN 600 MG: 600 TABLET ORAL at 06:02

## 2024-03-15 RX ADMIN — PANTOPRAZOLE SODIUM 40 MG: 40 INJECTION, POWDER, FOR SOLUTION INTRAVENOUS at 09:22

## 2024-03-15 RX ADMIN — FUROSEMIDE 40 MG: 10 INJECTION, SOLUTION INTRAMUSCULAR; INTRAVENOUS at 17:43

## 2024-03-15 RX ADMIN — THIAMINE HCL TAB 100 MG 100 MG: 100 TAB at 09:04

## 2024-03-15 RX ADMIN — SODIUM BICARBONATE 650 MG: 650 TABLET ORAL at 22:06

## 2024-03-15 RX ADMIN — GABAPENTIN 100 MG: 100 CAPSULE ORAL at 09:04

## 2024-03-15 RX ADMIN — Medication 5 DROP: at 22:08

## 2024-03-15 RX ADMIN — GUAIFENESIN 600 MG: 600 TABLET ORAL at 22:45

## 2024-03-15 RX ADMIN — BARIUM SULFATE 30 ML: 0.81 POWDER, FOR SUSPENSION ORAL at 11:40

## 2024-03-15 RX ADMIN — GABAPENTIN 100 MG: 100 CAPSULE ORAL at 22:06

## 2024-03-15 RX ADMIN — Medication 10 ML: at 22:06

## 2024-03-15 RX ADMIN — Medication 10 ML: at 09:07

## 2024-03-15 RX ADMIN — BARIUM SULFATE 3 ML: 400 PASTE ORAL at 12:00

## 2024-03-15 RX ADMIN — CARBIDOPA AND LEVODOPA 1 TABLET: 25; 100 TABLET ORAL at 22:06

## 2024-03-15 RX ADMIN — WARFARIN SODIUM 2.5 MG: 2.5 TABLET ORAL at 17:47

## 2024-03-15 RX ADMIN — Medication 4000 UNITS: at 09:04

## 2024-03-15 RX ADMIN — BARIUM SULFATE 35 ML: 400 SUSPENSION ORAL at 11:50

## 2024-03-15 RX ADMIN — FUROSEMIDE 40 MG: 10 INJECTION, SOLUTION INTRAMUSCULAR; INTRAVENOUS at 06:02

## 2024-03-15 RX ADMIN — METOPROLOL SUCCINATE 50 MG: 50 TABLET, EXTENDED RELEASE ORAL at 09:03

## 2024-03-15 RX ADMIN — BRIMONIDINE TARTRATE 1 DROP: 2 SOLUTION OPHTHALMIC at 09:04

## 2024-03-15 RX ADMIN — CARBIDOPA AND LEVODOPA 1 TABLET: 25; 100 TABLET ORAL at 09:04

## 2024-03-15 RX ADMIN — CEFTRIAXONE SODIUM 2 G: 2 INJECTION, SOLUTION INTRAVENOUS at 17:49

## 2024-03-15 ASSESSMENT — PAIN - FUNCTIONAL ASSESSMENT
PAIN_FUNCTIONAL_ASSESSMENT: 0-10

## 2024-03-15 ASSESSMENT — COGNITIVE AND FUNCTIONAL STATUS - GENERAL
MOVING FROM LYING ON BACK TO SITTING ON SIDE OF FLAT BED WITH BEDRAILS: A LOT
TURNING FROM BACK TO SIDE WHILE IN FLAT BAD: A LOT
CLIMB 3 TO 5 STEPS WITH RAILING: TOTAL
HELP NEEDED FOR BATHING: TOTAL
TOILETING: TOTAL
EATING MEALS: A LOT
DRESSING REGULAR UPPER BODY CLOTHING: A LOT
CLIMB 3 TO 5 STEPS WITH RAILING: TOTAL
WALKING IN HOSPITAL ROOM: TOTAL
DAILY ACTIVITIY SCORE: 11
DRESSING REGULAR UPPER BODY CLOTHING: A LOT
STANDING UP FROM CHAIR USING ARMS: A LOT
TURNING FROM BACK TO SIDE WHILE IN FLAT BAD: A LOT
PERSONAL GROOMING: A LOT
MOVING TO AND FROM BED TO CHAIR: A LOT
MOBILITY SCORE: 9
DRESSING REGULAR LOWER BODY CLOTHING: A LOT
WALKING IN HOSPITAL ROOM: A LOT
EATING MEALS: A LOT
TOILETING: TOTAL
MOVING FROM LYING ON BACK TO SITTING ON SIDE OF FLAT BED WITH BEDRAILS: A LOT
STANDING UP FROM CHAIR USING ARMS: TOTAL
DRESSING REGULAR LOWER BODY CLOTHING: A LOT
PERSONAL GROOMING: A LOT
MOBILITY SCORE: 11
HELP NEEDED FOR BATHING: A LOT
MOVING TO AND FROM BED TO CHAIR: A LOT
DAILY ACTIVITIY SCORE: 10

## 2024-03-15 ASSESSMENT — PAIN SCALES - GENERAL
PAINLEVEL_OUTOF10: 5 - MODERATE PAIN
PAINLEVEL_OUTOF10: 0 - NO PAIN

## 2024-03-15 NOTE — PROGRESS NOTES
"INPATIENT PROGRESS NOTES    PATIENT NAME: Vamsi Benson    MRN: [id number]  SERVICE DATE:  3/15/2024   SERVICE TIME:  12:43 PM    SIGNATURE: Aneudy Raymond MD      SUBJECTIVE  Afebrile  No events overnight       OBJECTIVE  PHYSICAL EXAM:   Patient Vitals for the past 24 hrs:   BP Temp Temp src Pulse Resp SpO2 Weight   03/15/24 0800 (!) 182/84 36 °C (96.8 °F) -- 76 19 97 % --   03/15/24 0601 -- -- -- -- -- -- 89.7 kg (197 lb 12 oz)   03/15/24 0536 -- -- -- 74 18 98 % --   03/15/24 0401 164/81 -- -- -- -- -- --   03/15/24 0400 160/68 36.6 °C (97.9 °F) Temporal 70 15 98 % --   03/15/24 0007 163/65 -- -- -- -- -- --   03/15/24 0000 174/81 36.5 °C (97.7 °F) Temporal 71 16 99 % --   03/14/24 2015 166/70 -- -- -- -- -- --   03/14/24 2000 (!) 194/88 36.6 °C (97.9 °F) Temporal 77 17 99 % --   03/14/24 1600 161/82 36.4 °C (97.5 °F) -- 65 16 97 % --         Gen: NAD  Neck: symmetric, no mass  Cardiovascular: RRR  Respiratory: No distress   Extremities: Right hand is wrapped  Skin: Warm and dry.      Labs:  Lab Results   Component Value Date    WBC 5.8 03/15/2024    HGB 8.2 (L) 03/15/2024    HCT 26.1 (L) 03/15/2024    MCV 94 03/15/2024     03/15/2024     Lab Results   Component Value Date    GLUCOSE 125 (H) 03/15/2024    CALCIUM 8.1 (L) 03/15/2024     03/15/2024    K 3.9 03/15/2024    CO2 26 03/15/2024     (H) 03/15/2024    BUN 39 (H) 03/15/2024    CREATININE 1.89 (H) 03/15/2024   ESR: --No results found for: \"SEDRATE\"  Lab Results   Component Value Date    CRP 6.16 (H) 03/09/2024     Lab Results   Component Value Date    ALT <3 (L) 03/13/2024    AST 11 03/13/2024    ALKPHOS 50 03/13/2024    BILITOT 0.3 03/13/2024         DATA:   Diagnostic tests reviewed for today's visit:    Labs this admission reviewed  Imagings this admission reviewed  Cultures: Reviewed        ASSESSMENT :   -Recent right hand abscess with tenosynovitis status post I&D on 2/23 at [facility]  -Recent group A strep bacteremia  -Latent " syphilis with most recent RPR 1:1.  Reported receiving 2 doses of IM penicillin as scheduled for the third dose on 3/11  -Acute anemia  -Positive urine culture for Candida albicans without symptoms likely colonization  -History of A-fib, renal failure, hypertension     PLAN:  -Continue ceftriaxone 2 g IV every 24 hours through 3/27 and follow-up with ID at [facility]  -Labs reviewed, no FRANCIE, no hepatitis, no thrombocytopenia  -Will continue to monitor closely for antibiotic side effects or drug drug interaction  -Received third dose of weekly IM penicillin on 3/11 to complete the course of latent syphilis  -Repeat RPR in 3 to 6 months    The plan was discussed with the pharmacy team    Aneudy Raymond MD.   Infectious Diseases Attending

## 2024-03-15 NOTE — PROGRESS NOTES
03/15/24 1639   Discharge Planning   Home or Post Acute Services Post acute facilities (Rehab/SNF/etc)   Type of Post Acute Facility Services Skilled nursing   Patient expects to be discharged to: [facility]     Spoke at length with the pt and his son. [facility]  said that they might not have a bed until next week and the private pay room rate would be $415/day if he does not participate in therapy (required for coverage under medicare). Per therapy, the pt did participate in therapy today. Reinforced with the pt to continue with therapy and he agrees. The pt's son visited other facilities and does like [facility] as well. A referral was sent.  The son also says that he contacted [facility] to enroll him in the medicare advantage plan. [facility] does have a private room available, however, they cannot accept the [facility] medicare once it is active in April unless the plan is for the pt to stay long term after his rehab. Spoke again with the pt's son. He wants to stay with the plan for [facility] and hopes they will have a bed open up. Sent a message to [facility]. If [facility] can't take him, the plan will be to go to [facility] short term with a plan for long term placement.

## 2024-03-15 NOTE — PROGRESS NOTES
Internal Medicine Progress Note    Patient Name: Vamsi Benson          MRN: [id number]  Today's Date: March 15, 2024          Attending: Rufino Osman MD    Subjective:  Patient was seen and examined at bedside.    Review Of Systems:  CARDIOVASCULAR: Negative for chest pain  RESPIRATORY: Improving shortness of breath  GI: No nausea, vomiting, or diarrhea  Musculoskeletal: Positive for wound in the right hand    Objective:  Vitals:    03/15/24 0536 03/15/24 0601 03/15/24 0800 03/15/24 1301   BP:   (!) 182/84 142/68   BP Location:       Patient Position:       Pulse: 74  76 75   Resp: 18  19 18   Temp:   36 °C (96.8 °F) 36.5 °C (97.7 °F)   TempSrc:       SpO2: 98%  97% 100%   Weight:  89.7 kg (197 lb 12 oz)     Height:               Physical Exam:   General appearance: Alert, in no acute distress  Lungs: Diffuse crackles  Heart: RRR without murmur, gallop, or rubs.  Abdomen: Soft, non-tender. Bowel sounds normal  Neuro: Alert, no focal deficit.    Labs:  Results for orders placed or performed during the hospital encounter of 03/08/24 (from the past 24 hour(s))   POCT GLUCOSE   Result Value Ref Range    POCT Glucose 128 (H) 74 - 99 mg/dL   POCT GLUCOSE   Result Value Ref Range    POCT Glucose 159 (H) 74 - 99 mg/dL   Magnesium   Result Value Ref Range    Magnesium 1.89 1.60 - 2.40 mg/dL   CBC   Result Value Ref Range    WBC 5.8 4.4 - 11.3 x10*3/uL    nRBC 0.0 0.0 - 0.0 /100 WBCs    RBC 2.77 (L) 4.50 - 5.90 x10*6/uL    Hemoglobin 8.2 (L) 13.5 - 17.5 g/dL    Hematocrit 26.1 (L) 41.0 - 52.0 %    MCV 94 80 - 100 fL    MCH 29.6 26.0 - 34.0 pg    MCHC 31.4 (L) 32.0 - 36.0 g/dL    RDW 15.8 (H) 11.5 - 14.5 %    Platelets 180 150 - 450 x10*3/uL   Basic metabolic panel   Result Value Ref Range    Glucose 125 (H) 74 - 99 mg/dL    Sodium 141 136 - 145 mmol/L    Potassium 3.9 3.5 - 5.3 mmol/L    Chloride 108 (H) 98 - 107 mmol/L    Bicarbonate 26 21 - 32 mmol/L    Anion Gap 11 10 - 20 mmol/L    Urea Nitrogen 39 (H) 6 - 23 mg/dL     Creatinine 1.89 (H) 0.50 - 1.30 mg/dL    eGFR 36 (L) >60 mL/min/1.73m*2    Calcium 8.1 (L) 8.6 - 10.3 mg/dL   Protime-INR   Result Value Ref Range    Protime 15.3 (H) 9.8 - 12.8 seconds    INR 1.4 (H) 0.9 - 1.1   B-Type Natriuretic Peptide   Result Value Ref Range     (H) 0 - 99 pg/mL   POCT GLUCOSE   Result Value Ref Range    POCT Glucose 147 (H) 74 - 99 mg/dL       Medications:  Scheduled medications  atorvastatin, 80 mg, oral, Nightly  brimonidine, 1 drop, Right Eye, BID  carbamide peroxide, 5 drop, Left Ear, BID  carbidopa-levodopa, 1 tablet, oral, BID  cefTRIAXone, 2 g, intravenous, q24h  cholecalciferol, 4,000 Units, oral, Daily  furosemide, 40 mg, intravenous, Once  [Held by provider] furosemide, 20 mg, oral, q48h  gabapentin, 100 mg, oral, BID  [Held by provider] glipiZIDE, 2.5 mg, oral, BID AC  guaiFENesin, 600 mg, oral, BID  insulin lispro, 0-5 Units, subcutaneous, Before meals & nightly  [Held by provider] insulin lispro, 4 Units, subcutaneous, Before meals & nightly  lidocaine, 5 mL, infiltration, Once  metoprolol succinate XL, 50 mg, oral, Daily  pantoprazole, 40 mg, oral, BID  sodium bicarbonate, 650 mg, oral, BID  sodium chloride 0.9%, 10 mL, intravenous, BID  thiamine, 100 mg, oral, Daily  warfarin, 2.5 mg, oral, Daily      Continuous medications     PRN medications  PRN medications: acetaminophen, dextrose 10 % in water (D10W), dextrose, glucagon, ipratropium-albuteroL, melatonin, oxygen, polyethylene glycol      Assessment/Plan:  Principal Problem:    Gastrointestinal hemorrhage with melena  Continue PPI p.o. 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 In such instances, original meaning may be extrapolated by contextual derivation  "

## 2024-03-15 NOTE — PROGRESS NOTES
INPATIENT NEPHROLOGY CONSULT PROGRESS NOTE      Patient Name: Vamsi Benson MRN: [id number]  DATE of SERVICE: March 15, 2024  TIME of SERVICE: 10:40 AM  CONSULTING SERVICE: Nephrology    REASON for CONSULT: FRANCIE    SUBJECTIVE:  Seen and evaluated at bedside,   Reports that he has been having mild shortness of breath.  Otherwise denies any chest pain, nausea, vomiting, abdominal pain, headache, dizziness.    SUMMARY OF STAY:  Mr. Benson is a 78-year-old male who presented to [facility] March 8, 2024 for evaluation of GI bleed complicated with acute kidney injury.  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  Recent evaluation of right upper extremity swelling was negative for DVT.  History of GI bleed status post EGD March 4, 2024 which demonstrated 3 nonbleeding ulcers in swelling of gastric outlet.  Patient presented to [facility] from [facility] for evaluation of acute blood loss anemia with drop in hemoglobin to 7 mg/dL associated with melena for 2 to 3 days prior to admission in addition to poor oral intake for 4 days.      ASSESSMENT:  1.  FRANCIE secondary to hemodynamically mediated in the setting of acute blood loss anemia with GI bleed and group A streptococcal bacteremia.  2.  Hypervolemia  3.  Hyperkalemia, improving.  4.  Metabolic acidosis, improving.  5.  Acute blood loss anemia.  6.  UTI.    Plan.  -Renal function has  been improving with creatinine down to 1.8 mg/dL and remains nonoliguric.  Chest x-ray with significant bilateral pulmonary infiltrates and got a dose of Lasix 40 mg IV once this morning and responded well.  I will repeat 1 more dose of her Lasix 40 mg IV once in the afternoon and assess response.  -Repeat renal function panel in the morning.  -Measure strict I's and O's.  -Fluid restriction for 1.2 L.  -Change in the CODE STATUS reviewed.  -Currently on ceftriaxone.  -Hypertension: Blood pressure has been fluctuating, continue to monitor for now.  -Continue sodium bicarbonate supplementation for metabolic acidosis.     Will follow, thank you!        Medications:    Current Facility-Administered Medications:     acetaminophen (Tylenol) tablet 650 mg, 650 mg, oral, q4h PRN, BRENDA Navarro, 650 mg at 03/13/24 2321    atorvastatin (Lipitor) tablet 80 mg, 80 mg, oral, Nightly, BRENDA Navarro, 80 mg at 03/14/24 2243    brimonidine (AlphaGAN) 0.2 % ophthalmic solution 1 drop, 1 drop, Right Eye, BID, BRENDA Navarro, 1 drop at 03/15/24 0904    carbamide peroxide (Debrox) 6.5 % otic solution 5 drop, 5 drop, Left Ear, BID, BRENDA Landeros, 5 drop at 03/15/24 0905    carbidopa-levodopa (Sinemet)  mg per tablet 1 tablet, 1 tablet, oral, BID, BRENDA Navarro, 1 tablet at 03/15/24 0904    cefTRIAXone (Rocephin) 2 g IV in dextrose 5% 50 mL, 2 g, intravenous, q24h, BRENDA Navarro, Last Rate: 100 mL/hr at 03/14/24 1729, 2 g at 03/14/24 1729    cholecalciferol (Vitamin D-3) tablet 4,000 Units, 4,000 Units, oral, Daily, BRENDA Navarro, 4,000 Units at 03/15/24 0904    dextrose 10 % in water (D10W) infusion, 0.3 g/kg/hr, intravenous, Once PRN, BRENDA Moulton    dextrose 50 % injection 25 g, 25 g, intravenous, q15 min PRN, Oralia Gramajo APRN-CNP    furosemide (Lasix) injection 40 mg, 40 mg, intravenous, Once, Abena Vincent MD    [Held by  provider] furosemide (Lasix) tablet 20 mg, 20 mg, oral, q48h, Dylon Marin MD    gabapentin (Neurontin) capsule 100 mg, 100 mg, oral, BID, BRENDA Navarro, 100 mg at 03/15/24 0904    [Held by provider] glipiZIDE (Glucotrol) tablet 2.5 mg, 2.5 mg, oral, BID AC, BRENDA Navarro    glucagon (Glucagen) injection 1 mg, 1 mg, intramuscular, q15 min PRN, BRENDA Moulton    guaiFENesin (Mucinex) 12 hr tablet 600 mg, 600 mg, oral, BID, BRENDA Navarro, 600 mg at 03/15/24 0602    insulin lispro (HumaLOG) injection 0-5 Units, 0-5 Units, subcutaneous, Before meals & nightly, BRENDA Moulton, 1 Units at 03/14/24 2140    [Held by provider] insulin lispro (HumaLOG) injection 4 Units, 4 Units, subcutaneous, Before meals & nightly, BRENDA Navarro    ipratropium-albuteroL (Duo-Neb) 0.5-2.5 mg/3 mL nebulizer solution 3 mL, 3 mL, nebulization, q2h PRN, BRENDA Navarro, 3 mL at 03/14/24 0738    lidocaine PF (Xylocaine) 10 mg/mL (1 %) injection 50 mg, 5 mL, infiltration, Once, BRENDA Landeros    melatonin tablet 3 mg, 3 mg, oral, Daily PRN, BRENDA Navarro, 3 mg at 03/13/24 2321    metoprolol succinate XL (Toprol-XL) 24 hr tablet 50 mg, 50 mg, oral, Daily, BRENDA Navarro, 50 mg at 03/15/24 0903    oxygen (O2) therapy, , inhalation, Continuous PRN - O2/gases, BRENDA Navarro, 2 L/min at 03/11/24 2000    pantoprazole (ProtoNix) EC tablet 40 mg, 40 mg, oral, BID, BRENDA Moulton    polyethylene glycol (Glycolax, Miralax) packet 17 g, 17 g, oral, Daily PRN, BRENDA Navarro    sodium bicarbonate tablet 650 mg, 650 mg, oral, BID, Dylon Marin MD, 650 mg at 03/15/24 0904    sodium chloride 0.9% flush 10 mL, 10 mL, intravenous, BID, BRENDA Navarro, 10 mL at 03/15/24 0907    thiamine (Vitamin B-1) tablet 100 mg, 100 mg, oral, Daily, BRENDA Navarro, 100 mg at 03/15/24 0904    warfarin  (Coumadin) tablet 2.5 mg, 2.5 mg, oral, Daily, Oralia Grmaajo, APRN-CNP    PERTINENT ROS:  GENERAL:  positive for fatigue, poor appetite.  No fever/chills  RESPIRATORY:  positive for shortness of breath.  Negative for cough, wheezing.  CARDIOVASCULAR:   Negative for chest pain or palpitation.  GI:  Negative for abdominal pain, diarrhea, heartburn, nausea, vomiting  : Indwelling Conner catheter in place    Physical Exam:  Vital signs in last 24 hours:  Temp:  [36 °C (96.8 °F)-36.6 °C (97.9 °F)] 36.5 °C (97.7 °F)  Heart Rate:  [65-77] 75  Resp:  [15-19] 18  BP: (142-194)/(65-88) 142/68    General: Awake, cooperative, not in acute distress, right central catheter in place with no signs of infection at exit site.  HEENT:  NCAT,  mucous membranes moist and pink  NECK:  Elevated JVD, no carotid bruit, supple, no cervical mass or thyromegaly  LUNGS;  Diminished breath sounds, fine Rales  CV:  Distant, regular rate and rhythm, + murmurs  ABDOMEN:  abdomen soft, nontender, ongoing GI bleed  EDEMA:  trace lower extremity edema/+dependent edema  SKIN: Right breast with dressing in place  : Indwelling Conner catheter in place    Intake/Output last 3 shifts:  I/O last 3 completed shifts:  In: 480 (5.4 mL/kg) [P.O.:480]  Out: 2225 (24.8 mL/kg) [Urine:2225 (0.7 mL/kg/hr)]  Weight: 89.7 kg     DATA:  Diagnotic tests reviewed for Todays visit:  Results from last 7 days   Lab Units 03/15/24  0549   WBC AUTO x10*3/uL 5.8   RBC AUTO x10*6/uL 2.77*   HEMOGLOBIN g/dL 8.2*   HEMATOCRIT % 26.1*     Results from last 7 days   Lab Units 03/15/24  0549 03/14/24  0518 03/13/24  1555 03/10/24  0651 03/09/24  0555   SODIUM mmol/L 141   < >  --    < > 140   POTASSIUM mmol/L 3.9   < >  --    < > 5.0   CHLORIDE mmol/L 108*   < >  --    < > 112*   CO2 mmol/L 26   < >  --    < > 19*   BUN mg/dL 39*   < >  --    < > 65*   CREATININE mg/dL 1.89*   < >  --    < > 2.32*   CALCIUM mg/dL 8.1*   < >  --    < > 8.0*   PHOSPHORUS mg/dL  --   --   --   --   5.5*   MAGNESIUM mg/dL 1.89   < >  --    < > 2.10   BILIRUBIN TOTAL mg/dL  --   --  0.3  --   --    ALT U/L  --   --  <3*  --   --    AST U/L  --   --  11  --   --     < > = values in this interval not displayed.     Results from last 7 days   Lab Units 03/09/24  0421   COLOR U  Yellow   APPEARANCE U  Hazy*   PH U  5.0   SPEC GRAV UR  1.029   PROTEIN U mg/dL 100 (2+)*   BLOOD UR  LARGE (3+)*   NITRITE U  NEGATIVE   WBC UR /HPF >50*     IMAGING: CXR reviewed in  images      SIGNATURE: Abena Vincent MD  Nephrology and Hypertension  [address]  Office phone: [phone]  FAX: [phone]    This note was partially generated using the Dragon voice recognition system, and there may be some incorrect words, spelling's and punctuation that were not noted in checking the note before saving.

## 2024-03-15 NOTE — PROCEDURES
Inpatient Modified Barium Swallow Study     Patient Name: Vamsi Benson  MRN: [id number]  : 1945  Today's Date: 03/15/24        Recommendations:  DIET:   minced and moist/IDDSI Level 5   mildly thick (nectar)/IDDSI Level 2   STRATEGIES:   -Sit upright 90 degrees for all PO  -Remain upright 20-30 minutes after meals  -Feed / Eat at a slow rate  -Double swallows  -Small Bite/Sip  -Medications - whole in puree carrier  -Diligent and regular oral Care   EXERCISES: Effortful swallow, supraglottic swallow, RMST    Assessment/Impression:    MBSImP Physiological Component  ORAL PHASE:  Tongue Control During Bolus Hold - Escape to lateral buccal cavity and/or floor of mouth   Bolus prep/mastication - Slow prolonged mastication with complete re-collection necessary   Bolus transport/lingual motion - Delayed initiation of tongue motion for A-P movement of the bolus   Oral residue - Residue collection on oral structure     PHARYNGEAL PHASE:  Initiation of pharyngeal swallow - Bolus head at pit of pyriforms   Laryngeal elevation - Partial superior movement of thyroid cartilage and/or partial approximation of arytenoids to epiglottic petiole   Anterior hyoid excursion - No anterior movement   Laryngeal vestibule closure - Incomplete - narrow column of air/contrast in laryngeal vestibule   Pharyngeal stripping wave -  Absent   Tongue base retraction - Narrow column of contrast or air between tongue base and pharyngeal wall   Pharyngeal residue - Collection of residue within or on the pharyngeal structures     ESOPHAGEAL PHASE:  Esophageal clearance - Not evaluated     SLP impressions with severity rating: Patient presents with mild-moderately severe oropharyngeal dysphagia upon completion of a modified barium swallow study this date. Specific impairments are identified above. The impairments most affecting the safety and efficiency of the swallow include absent pharyngeal stripping wave, diminished laryngeal elevation and  "laryngeal vestibule closure and reduced base of tongue retraction. Penetration with trace SILENT aspiration of residues was observed during the thin liquid trial. A chin tuck did not effectively protect the airway with this consistency. Penetration with trace amount of contrast to the level of the TVF was observed with Mildly thick (nectar)/IDDSI Level 2. Cued throat clear/cough did not clear residues. Presence of secretions in the larynx likely contributed to increased collection of residues with mildly thick liquids as observed by secretions clinging to the epiglottis. The patient was hesitant to continue due to upset stomach/nausea. With encouragement he attempted the solid trial but the bolus was expectorated because his mouth was too dry.  Based on the results of the study, the patient is favorable to continue with the current diet. Speech therapy to follow to initiate pharyngeal strengthening exercises and determine possible advancement of solids.    Dysphagia Outcome Severity Scale Rating: Mild-moderate dysphagia; Level 4      Full, detailed SLP/Radiologist modified barium swallow study report can be found under \"Chart Review\" tab, \"Imaging tab\" and  titled \"FL modified barium swallow study\".    Plan:  Treatment/Interventions: Pharyngeal exercises, Compensatory strategy training, Patient/family education  SLP Plan: Skilled SLP warranted  SLP Frequency: 3x per week  Duration: Current admission  Discussed POC: Patient, Nursing   Discussed Risks/Benefits: Yes  Patient/Caregiver Agreeable: Yes    Subjective:  Patient was agreeable to completion of study given explanation of procedure and rationale.    GOALS: Established 3/14/24:  1. Patient will tolerate baseline diet absent of overt s/s of aspiration in 90% of observed therapeutic trials.  Status: Goalstatus: Ongoing  Progress: See recommendations above    2. Patient will implement safe swallowing strategies to reduce risk of aspiration in 90% of trials given " caregiver assistance/cueing as needed.  Status: Goalstatus: Ongoing  Progress: Goal partially met    3. Patient will complete modified barium swallow study (MBSS) for objective assessment of oropharyngeal swallow function, to assess for aspiration, and to guide further recommendations and treatment plan.  Status: Goal met    4. Patient will complete recommended swallowing exercises (effortful swallow, chin tuck against resistance, supraglottic swallow) for at least 30 repetitions during treatment session given minimal-moderate cues. NEW GOAL, established 3/15    4. Patient will complete respiratory muscle strength training (RMST) at recommended frequency given minimal-moderate cues. NEW GOAL, established 3/15    Long term goal: Maintain adequate nutrition and hydration with the least restrictive oral diet with no overt s/s of aspiration or pulmonary compromise.    Pain:   Rating 0-10: 0  Location: 0 no pain     Education:   Patient educated on results of MBS study, recommended diet, and recommended safe swallow strategies. Patient verbalized understanding of education provided.

## 2024-03-15 NOTE — PROGRESS NOTES
Occupational Therapy    OT Treatment    Patient Name: Vamsi Benson  MRN: [id number]  Today's Date: 3/15/2024  Time Calculation  Start Time: 0921  Stop Time: 1001  Time Calculation (min): 40 min         Assessment:  OT Assessment: Pt weak and fatigued. able to get to EOB for a couple minutes and then is exhausted.  Evaluation/Treatment Tolerance: Patient limited by fatigue  End of Session Communication: Bedside nurse  End of Session Patient Position: Bed, 3 rail up, Alarm on  OT Assessment Results: Decreased ADL status, Decreased upper extremity range of motion, Decreased upper extremity strength, Decreased safe judgment during ADL, Decreased cognition, Decreased endurance, Decreased fine motor control, Decreased functional mobility, Decreased gross motor control, Decreased trunk control for functional activities  Evaluation/Treatment Tolerance: Patient limited by fatigue    Plan:  Treatment Interventions: UE strengthening/ROM, Functional transfer training  OT Frequency: 3 times per week  OT Discharge Recommendations: Moderate intensity level of continued care  Treatment Interventions: UE strengthening/ROM, Functional transfer training  Subjective     Outcome Measures:[facility] Daily Activity  Putting on and taking off regular lower body clothing: A lot  Bathing (including washing, rinsing, drying): A lot  Putting on and taking off regular upper body clothing: A lot  Toileting, which includes using toilet, bedpan or urinal: Total  Taking care of personal grooming such as brushing teeth: A lot  Eating Meals: A lot  Daily Activity - Total Score: 11         03/15/24 0921   OT Last Visit   OT Received On 03/15/24   General   Reason for Referral ADL   Prior to Session Communication Bedside nurse   Patient Position Received Bed, 3 rail up;Alarm on   Preferred Learning Style verbal;visual   General Comment Admitted with GIB with melena, FRANCIE, metabolic acidosis, acute anemia, duodenal and esophageal ulcers, bilat. pleural  effusions, extensor tenosynovitis R wrist, wound R hand.  RUE UE (-) DVT.   Precautions   Medical Precautions Fall precautions   Precautions Comment Treat R wrist as non WB'ing until orders are clear   Pain Assessment   Pain Score 5 - Moderate pain   Pain Type Acute pain   Pain Location Wrist   Pain Orientation Right   Pain Interventions   (OT to tolerance)   Cognition   Orientation Level Oriented X4   UE Bathing   UE Bathing Comments PCA just finished bathing pt   Toileting   Toileting Level of Assistance Dependent   Toileting Comments has indwelling ramirez   Bed Mobility   Bed Mobility   (max A to roll L, Mod A to roll R. max A of 2 to get to EOB and back into bed.)   Static Sitting Balance   Static Sitting-Level of Assistance Contact guard   Static Sitting-Comment/Number of Minutes pt c/o dizziness while seated EOB. after a couple of minutes asked to lie down   Static Standing Balance   Static Standing-Comment/Number of Minutes pt declined to attempt   Therapeutic Exercise   Therapeutic Exercise Performed Yes  (UE exercise supine)   Therapeutic Exercise Activity 1 AAROM, R shld flex/ext int/ext rot 8 x 1   Therapeutic Exercise Activity 2 AAROM to R elbow flex/ext 10 x 1   Therapeutic Exercise Activity 3 AROM, L shld flex/ext , int/ ext rot10 x1   Therapeutic Exercise Activity 4 AROM L elbow flex/ext pron/sup 10 x 1   Therapeutic Exercise Activity 5 AROM L wrist flex/ext 10 x1   Therapeutic Exercise Activity 6 L hand grasp 5 x.   Therapeutic Exercise Activity 7 pt has not wrist or digital function in R hand, R hand bandaged   IP OT Assessment   OT Assessment Pt weak and fatigued. able to get to EOB for a couple minutes and then is exhausted.   Evaluation/Treatment Tolerance Patient limited by fatigue   End of Session Communication Bedside nurse   End of Session Patient Position Bed, 3 rail up;Alarm on   OT Assessment   OT Assessment Results Decreased ADL status;Decreased upper extremity range of motion;Decreased  upper extremity strength;Decreased safe judgment during ADL;Decreased cognition;Decreased endurance;Decreased fine motor control;Decreased functional mobility;Decreased gross motor control;Decreased trunk control for functional activities   Education   Individual(s) Educated Patient   Education Provided Fall precautons   Patient Response to Education Patient/Caregiver Verbalized Understanding of Information   Education Comment pt would benefit from continued reinforcement   Inpatient Plan   Treatment Interventions UE strengthening/ROM;Functional transfer training   OT Frequency 3 times per week   OT Discharge Recommendations Moderate intensity level of continued care         Goals:  Encounter Problems       Encounter Problems (Active)       OT Goals       Max assist bed mobility.       Start:  03/12/24    Expected End:  03/26/24            Max assist sit/stand for ADL.        Start:  03/12/24    Expected End:  03/26/24            Mod assist UB dressing.        Start:  03/12/24    Expected End:  03/26/24            Fair dynamic sitting balance for ADL.        Start:  03/12/24    Expected End:  03/26/24

## 2024-03-15 NOTE — NURSING NOTE
1100: Pt off unit to [facility].    1216: Pt return to unit with call light within reach

## 2024-03-15 NOTE — PROGRESS NOTES
"Nutrition Initial Assessment:   Nutrition Assessment    Reason for Assessment: Dietitian discretion (Auto referral from  with diet downgrade)    Patient History: Vamsi Benson is a 78 y.o. male presenting to ED on 3/8 with abnormal labs and concern for continued GI bleed.  On admit poor PO intakes were reported after pt was transferred here from [facility].  Pt was recently at [facility] in February due to cellulitis of the right hand as well as coughing up blood on admit.  EGD 3/4/24 showed duodenitis, duodenal ulcer, and esophageal ulcers.  He was discharged to SNF on 3/6. This admit pulmonology is on consult due to bilateral pleural effusions.  Nephrology following due to FRANCIE.  Palliative care on consult for goals of care and pt elected to try SNF again after discharge rather than pursing hospice care at this time.    Past Medical History: HTN, HLD, T2DM on insulin, CAD s/p CABG x 5 in 2016, PAF, embolic stroke, Parkinson's, and polyneuropathy     Nutrition History:  Energy Intake: Poor < 50 %  Food and Nutrient History: On 3/14  saw as nursing was noting pt to have difficulty swallowing liquids.  After eval diet was downgraded to Minced/Moist Level 5 with Mildly Thick ([place]) liquids.  MBSS completed today recommended continuation of altered texture diet.  Pt notes dislike for altered textures but that his appetite was already poor before the diet changes.  Very soft spoken but does answer questions appropriately.  He would like to get high calorie supplements with meals.  Vitamin/Herbal Supplement Use: MVI with minerals  Food Allergies/Intolerances:  None  GI Symptoms:  anorexia  Oral Problems: Chewing difficulty and Swallowing difficulty       Current Diet: Adult diet Carb Controlled; 60 gram carb/meal, 30 gram Carb evening snack; Minced/moist food 5; Mild thick 2; 1:1 Feeding    Anthropometrics:  Height: 188 cm (6' 2\")   Weight: 89.7 kg (197 lb 12 oz)   BMI (Calculated): 25.38      "         03/08/24 03/10/24 06:00 03/11/24 04:00 03/12/24 06:00 03/13/24 06:00 03/14/24   Weight 91.4 kg (201 lb 8 oz) 90.4 kg (199 lb 4.7 oz) 90.9 kg (200 lb 4.8 oz) 88.6 kg (195 lb 5.2 oz) 88.3 kg (194 lb 11.2 oz) 88.9 kg (195 lb 15.8 oz)   Note: Poor weight history on record prior to 2024      Pain Assessment: 0-10  Pain Score: -- (unable to rate)  Pain Type: Chronic pain  Pain Location: Hand  Pain Orientation: Right  Pain Radiating Towards: when touched    Nutrition Significant Labs:  BMP Trend:   Results from last 7 days   Lab Units 03/15/24  0549 03/14/24  0518 03/13/24  0523 03/12/24  0641   GLUCOSE mg/dL 125* 124* 114* 129*   CALCIUM mg/dL 8.1* 8.1* 8.1* 8.3*   SODIUM mmol/L 141 140 142 138   POTASSIUM mmol/L 3.9 4.1 4.4 4.3   CO2 mmol/L 26 24 22 23   CHLORIDE mmol/L 108* 110* 112* 110*   BUN mg/dL 39* 43* 46* 47*   CREATININE mg/dL 1.89* 2.00* 2.06* 2.20*     Nutrition Specific Medications:  Scheduled medications  atorvastatin, 80 mg, oral, Nightly  brimonidine, 1 drop, Right Eye, BID  carbamide peroxide, 5 drop, Left Ear, BID  carbidopa-levodopa, 1 tablet, oral, BID  cefTRIAXone, 2 g, intravenous, q24h  cholecalciferol, 4,000 Units, oral, Daily  furosemide, 40 mg, intravenous, Once  [Held by provider] furosemide, 20 mg, oral, q48h  gabapentin, 100 mg, oral, BID  [Held by provider] glipiZIDE, 2.5 mg, oral, BID AC  guaiFENesin, 600 mg, oral, BID  insulin lispro, 0-5 Units, subcutaneous, Before meals & nightly  [Held by provider] insulin lispro, 4 Units, subcutaneous, Before meals & nightly  lidocaine, 5 mL, infiltration, Once  metoprolol succinate XL, 50 mg, oral, Daily  pantoprazole, 40 mg, oral, BID  sodium bicarbonate, 650 mg, oral, BID  sodium chloride 0.9%, 10 mL, intravenous, BID  thiamine, 100 mg, oral, Daily  warfarin, 2.5 mg, oral, Daily      Nutrition Focused Physical Exam Findings:  Subcutaneous Fat Loss:   Orbital Fat Pads: Severe (dark circles, hollowing and loose skin)  Buccal Fat Pads: Severe  (hollow, sunken and narrow face)  Triceps: Defer  Ribs: Defer  Muscle Wasting:  Temporalis: Severe (hollowed scooping depression)  Pectoralis (Clavicular Region): Severe (protruding prominent clavicle)  Deltoid/Trapezius: Severe (squared shoulders, acromion process prominent)  Interosseous: Defer  Trapezius/Infraspinatus/Supraspinatus (Scapular Region): Defer  Quadriceps: Defer  Gastrocnemius: Defer  Edema:  Edema: +1 trace  Edema Location: lower extremitites  Physical Findings:  Hair: Negative  Eyes: Negative  Mouth: Negative  Nails: Negative  Skin: Positive (pale in appearance)    I/O:   Last BM Date: 03/12/24; Stool Appearance: Tarry (03/12/24 2000)     Estimated Needs:   Total Energy Estimated Needs (kCal):  (2240-2690kcal or 25-30kcal/kg)  Total Protein Estimated Needs (g):  (90-108g or 1.0-1.2g/kg)  Total Fluid Estimated Needs (mL):  (1ml/kcal or per MD recs)          Nutrition Diagnosis   Malnutrition Diagnosis  Patient has Malnutrition Diagnosis: Yes  Diagnosis Status: New  Malnutrition Diagnosis: Severe malnutrition related to acute disease or injury  As Evidenced by: acute on chronic nutritional stress in the setting of recurrent GI bleed and new dysphagia with indicators including NFPE showing signs of severe muscle wasting/fat loss and intakes meeting <50% of needs for > 5 days.            Nutrition Interventions/Recommendations         Nutrition Prescription:  Individualized Nutrition Prescription Provided for : Continue Minced Moist Level 5 solids and Mildy thick (Nectar) liquids, suggest increase carbohydrate amount to 75g CHO per meal and 45g per evening snack, continue 1:1 feeding        Nutrition Interventions:   Food and/or Nutrient Delivery Interventions  Interventions: Meals and snacks, Medical food supplement  Goal: offer food/drink every 2-3 hours throughout the day  Medical Food Supplement: Commercial beverage, Commercial food  Goal: Recommend Boost VHC (540kcal, 22pro each) BID with  breakfast and dinner, recommend Magic Cup (290kcal,9g pro) with lunch meal trays    Coordination of Nutrition Care by a Nutrition Professional  Collaboration and Referral of Nutrition Care: Collaboration by nutrition professional with other providers  Goal: RN Candy       Nutrition Monitoring and Evaluation   Food/Nutrient Related History Monitoring  Monitoring and Evaluation Plan: Fluid intake, Amount of food  Fluid Intake: Estimated fluid intake  Criteria: Goal to consume >75% of fluids provided  Amount of Food: Estimated amout of food  Criteria: Goal to consume >50% of meals provided    Body Composition/Growth/Weight History  Monitoring and Evaluation Plan: Weight  Weight: Measured weight  Criteria: maintain stable weight    Biochemical Data, Medical Tests and Procedures  Monitoring and Evaluation Plan: Electrolyte/renal panel  Electrolyte and Renal Panel: Sodium, Chloride, BUN, Potassium  Criteria: maintain WNR            Time Spent/Follow-up Reminder:   Time Spent (min): 60 minutes  Last Date of Nutrition Visit: 03/15/24  Nutrition Follow-Up Needed?: 3-8 days  Follow up Comment: [facility] - severe mal - poor intakes even before new dysphagia

## 2024-03-15 NOTE — PROGRESS NOTES
Physical Therapy    Physical Therapy Treatment    Patient Name: Vamsi Benson  MRN: [id number]  Today's Date: 3/15/2024  Time Calculation  Start Time: 1000  Stop Time: 1030  Time Calculation (min): 30 min       Assessment/Plan   PT Assessment  PT Assessment Results: Decreased strength, Decreased endurance, Impaired balance, Decreased mobility  Rehab Prognosis: Fair  End of Session Communication: Bedside nurse  Assessment Comment: improved overall function.  some encouraging words to get patient to continue participating. improved seated balance though self limiting.  good performance of supine therex.  End of Session Patient Position: Up in chair, Alarm on  PT Plan  Inpatient/Swing Bed or Outpatient: Inpatient  PT Plan  Treatment/Interventions: Bed mobility, Transfer training, Balance training, Strengthening, Range of motion, Therapeutic exercise, Positioning  PT Plan: Skilled PT  PT Frequency: 3 times per week  PT Discharge Recommendations: Moderate intensity level of continued care  PT Recommended Transfer Status: Assist x2, Assistive device       03/15/24 1000   PT  Visit   PT Received On 03/15/24   Response to Previous Treatment Patient with no complaints from previous session.   General   Prior to Session Communication Bedside nurse   Patient Position Received Bed, 3 rail up;Alarm on   Preferred Learning Style verbal;visual   General Comment encouragement ot participate.  was cooperative but self limiting needing re-assurance and education on why therapy is so important for patients recovery.   Precautions   Medical Precautions Fall precautions   Splinting R hand is dressed with gauze and ace wrap   Precautions Comment NWB R hand   Pain Assessment   Pain Assessment 0-10   Pain Score   (unable to rate)   Pain Type Chronic pain   Pain Location Hand   Pain Orientation Right   Pain Radiating Towards when touched   Cognition   Overall Cognitive Status Impaired   Orientation Level Oriented X4   Safety/Judgement X    Insight Moderate   Task Initiation Delayed initiation   Postural Control   Trunk Control fair   Righting Reactions fair   Protective Responses fair   Posture Comment mild retro lean, making attempt to correct.  varied assist needed to remain edge of bed,   General Observation   General Observation improved esge of bed balance though adamant he lay back down before stand trial was initiated.   Static Sitting Balance   Static Sitting-Balance Support No upper extremity supported   Static Sitting-Level of Assistance Minimum assistance;Contact guard   Static Sitting-Comment/Number of Minutes 3-5 min   Therapeutic Exercise   Therapeutic Exercise Performed Yes   Therapeutic Exercise Activity 1 ankle pumps x10   Therapeutic Exercise Activity 2 resisted foot press x10   Therapeutic Exercise Activity 3 heel slides x10   Therapeutic Exercise Activity 4 hip ABD x10 (AAROM)   Therapeutic Exercise Activity 5 pillow squeeze x10   Bed Mobility   Bed Mobility Yes   Bed Mobility 1   Bed Mobility 1 Supine to sitting;Sitting to supine   Level of Assistance 1 Maximum assistance   Bed Mobility Comments 1 x2 assist   PT Assessment   PT Assessment Results Decreased strength;Decreased endurance;Impaired balance;Decreased mobility   Rehab Prognosis Fair   End of Session Communication Bedside nurse   Assessment Comment improved overall function.  some encouraging words to get patient to continue participating. improved seated balance though self limiting.  good performance of supine therex.   End of Session Patient Position Up in chair;Alarm on   Outpatient Education   Individual(s) Educated Patient   Education Provided Fall Risk;Home Exercise Program;POC;Other  (positioing)   Risk and Benefits Discussed with Patient/Caregiver/Other yes   Patient/Caregiver Demonstrated Understanding yes   Plan of Care Discussed and Agreed Upon yes   Patient Response to Education Patient/Caregiver Verbalized Understanding of Information   PT Plan    Inpatient/Swing Bed or Outpatient Inpatient   PT Plan   Treatment/Interventions Bed mobility;Transfer training;Balance training;Strengthening;Range of motion;Therapeutic exercise;Positioning   PT Plan Skilled PT   PT Frequency 3 times per week   PT Discharge Recommendations Moderate intensity level of continued care   PT Recommended Transfer Status Assist x2;Assistive device     Outcome Measures:  [facility] Basic Mobility  Turning from your back to your side while in a flat bed without using bedrails: A lot  Moving from lying on your back to sitting on the side of a flat bed without using bedrails: A lot  Moving to and from bed to chair (including a wheelchair): A lot  Standing up from a chair using your arms (e.g. wheelchair or bedside chair): A lot  To walk in hospital room: A lot  Climbing 3-5 steps with railing: Total  Basic Mobility - Total Score: 11      EDUCATION:  Outpatient Education  Individual(s) Educated: Patient  Education Provided: Fall Risk, Home Exercise Program, POC, Other (positioing)  Risk and Benefits Discussed with Patient/Caregiver/Other: yes  Patient/Caregiver Demonstrated Understanding: yes  Plan of Care Discussed and Agreed Upon: yes  Patient Response to Education: Patient/Caregiver Verbalized Understanding of Information  GOALS:  Encounter Problems       Encounter Problems (Active)       PT Problem       PT Goal 1 (Progressing)       Start:  03/12/24    Expected End:  03/26/24       Pt able to perform bed mobility with mod assist of 2.           PT Goal 2 (Progressing)       Start:  03/12/24    Expected End:  03/26/24       Pt able to complete all transfers with mod assist of 2.            PT Goal 3 (Progressing)       Start:  03/12/24    Expected End:  03/26/24       Pt able to ambulate 10 feet with LRAD and mod assist of 2.

## 2024-03-15 NOTE — CARE PLAN
EOS:    No acute events this shift.    Pt medicated as ordered this shift.    -Pt without reported discomfort this shift.     Dressing on Right hand changed 2230 at pt preference.     Pt maintained on telemetry, pt displaying NSR with PVC's    - paged.   -RN instructed to monitor, will do so.    Pt maintained on 2L NC.    -0536: pt reporting increasing work of breathing, page to .   -IVP 40 mg Lasix and mucinex 600 mg and STAT chest x-ray obtained.    -medications administered to pt asap.   -Pt s/s reflected via flowsheets after administration of IVP lasix. Lobe specific sounds auscultated with sounds reflected in flowsheets.     Pt with bed alarm active, call light within reach, safety maintained.       The patient's goals for the shift include      The clinical goals for the shift include Get some rest      Problem: Psychosocial Needs  Goal: Collaborate with me, my family, and caregiver to identify my specific goals  Outcome: Progressing     Problem: Discharge Barriers  Goal: My discharge needs are met  Outcome: Progressing     Problem: Skin  Goal: Promote/optimize nutrition  Outcome: Progressing  Flowsheets (Taken 3/15/2024 0123)  Promote/optimize nutrition: Offer water/supplements/favorite foods  Goal: Decreased wound size/increased tissue granulation at next dressing change  Outcome: Progressing  Flowsheets (Taken 3/15/2024 0123)  Decreased wound size/increased tissue granulation at next dressing change: Protective dressings over bony prominences  Goal: Prevent/minimize sheer/friction injuries  Outcome: Progressing  Flowsheets (Taken 3/15/2024 0123)  Prevent/minimize sheer/friction injuries: Use pull sheet  Goal: Promote skin healing  Outcome: Progressing  Flowsheets (Taken 3/15/2024 0123)  Promote skin healing:   Assess skin/pad under line(s)/device(s)   Protective dressings over bony prominences

## 2024-03-15 NOTE — CARE PLAN
Pt had uneventful night. No acute changes was noted. Pt had MBSS today and tolerated well. Pt had nno c/o pain noted. Pt had BM today. Pt refused all meal and was educated on the importance of eating. Pt POA was updated this shift. Pt safety been maintained.

## 2024-03-16 ENCOUNTER — APPOINTMENT (OUTPATIENT)
Dept: RADIOLOGY | Facility: HOSPITAL | Age: 79
DRG: 811 | End: 2024-03-16
Payer: MEDICARE

## 2024-03-16 LAB
ANION GAP SERPL CALC-SCNC: 10 MMOL/L (ref 10–20)
BUN SERPL-MCNC: 36 MG/DL (ref 6–23)
CALCIUM SERPL-MCNC: 7.9 MG/DL (ref 8.6–10.3)
CHLORIDE SERPL-SCNC: 107 MMOL/L (ref 98–107)
CO2 SERPL-SCNC: 28 MMOL/L (ref 21–32)
CREAT SERPL-MCNC: 1.94 MG/DL (ref 0.5–1.3)
EGFRCR SERPLBLD CKD-EPI 2021: 35 ML/MIN/1.73M*2
ERYTHROCYTE [DISTWIDTH] IN BLOOD BY AUTOMATED COUNT: 16 % (ref 11.5–14.5)
GLUCOSE BLD MANUAL STRIP-MCNC: 120 MG/DL (ref 74–99)
GLUCOSE BLD MANUAL STRIP-MCNC: 126 MG/DL (ref 74–99)
GLUCOSE BLD MANUAL STRIP-MCNC: 147 MG/DL (ref 74–99)
GLUCOSE BLD MANUAL STRIP-MCNC: 154 MG/DL (ref 74–99)
GLUCOSE SERPL-MCNC: 123 MG/DL (ref 74–99)
HCT VFR BLD AUTO: 25.9 % (ref 41–52)
HGB BLD-MCNC: 8.1 G/DL (ref 13.5–17.5)
INR PPP: 1.4 (ref 0.9–1.1)
MAGNESIUM SERPL-MCNC: 1.77 MG/DL (ref 1.6–2.4)
MCH RBC QN AUTO: 29.5 PG (ref 26–34)
MCHC RBC AUTO-ENTMCNC: 31.3 G/DL (ref 32–36)
MCV RBC AUTO: 94 FL (ref 80–100)
NRBC BLD-RTO: 0 /100 WBCS (ref 0–0)
PLATELET # BLD AUTO: 155 X10*3/UL (ref 150–450)
POTASSIUM SERPL-SCNC: 3.6 MMOL/L (ref 3.5–5.3)
PROTHROMBIN TIME: 15.8 SECONDS (ref 9.8–12.8)
RBC # BLD AUTO: 2.75 X10*6/UL (ref 4.5–5.9)
SODIUM SERPL-SCNC: 141 MMOL/L (ref 136–145)
WBC # BLD AUTO: 5.6 X10*3/UL (ref 4.4–11.3)

## 2024-03-16 PROCEDURE — 85610 PROTHROMBIN TIME: CPT | Performed by: NURSE PRACTITIONER

## 2024-03-16 PROCEDURE — 2500000001 HC RX 250 WO HCPCS SELF ADMINISTERED DRUGS (ALT 637 FOR MEDICARE OP): Performed by: NURSE PRACTITIONER

## 2024-03-16 PROCEDURE — 82947 ASSAY GLUCOSE BLOOD QUANT: CPT

## 2024-03-16 PROCEDURE — 2500000002 HC RX 250 W HCPCS SELF ADMINISTERED DRUGS (ALT 637 FOR MEDICARE OP, ALT 636 FOR OP/ED): Mod: MUE | Performed by: NURSE PRACTITIONER

## 2024-03-16 PROCEDURE — 2500000004 HC RX 250 GENERAL PHARMACY W/ HCPCS (ALT 636 FOR OP/ED): Performed by: INTERNAL MEDICINE

## 2024-03-16 PROCEDURE — 83735 ASSAY OF MAGNESIUM: CPT | Performed by: NURSE PRACTITIONER

## 2024-03-16 PROCEDURE — 2500000001 HC RX 250 WO HCPCS SELF ADMINISTERED DRUGS (ALT 637 FOR MEDICARE OP): Performed by: INTERNAL MEDICINE

## 2024-03-16 PROCEDURE — 71045 X-RAY EXAM CHEST 1 VIEW: CPT

## 2024-03-16 PROCEDURE — 85027 COMPLETE CBC AUTOMATED: CPT | Performed by: NURSE PRACTITIONER

## 2024-03-16 PROCEDURE — 2500000004 HC RX 250 GENERAL PHARMACY W/ HCPCS (ALT 636 FOR OP/ED): Performed by: NURSE PRACTITIONER

## 2024-03-16 PROCEDURE — 82374 ASSAY BLOOD CARBON DIOXIDE: CPT | Performed by: NURSE PRACTITIONER

## 2024-03-16 PROCEDURE — 1200000002 HC GENERAL ROOM WITH TELEMETRY DAILY

## 2024-03-16 PROCEDURE — 71045 X-RAY EXAM CHEST 1 VIEW: CPT | Performed by: RADIOLOGY

## 2024-03-16 RX ORDER — AMLODIPINE BESYLATE 5 MG/1
5 TABLET ORAL DAILY
Status: DISCONTINUED | OUTPATIENT
Start: 2024-03-16 | End: 2024-03-17

## 2024-03-16 RX ORDER — CEFTRIAXONE 2 G/50ML
2 INJECTION, SOLUTION INTRAVENOUS EVERY 24 HOURS
Qty: 550 ML | Refills: 0
Start: 2024-03-16 | End: 2024-03-27

## 2024-03-16 RX ORDER — POLYETHYLENE GLYCOL 3350 17 G/17G
17 POWDER, FOR SOLUTION ORAL DAILY PRN
Refills: 0
Start: 2024-03-16 | End: 2024-04-15

## 2024-03-16 RX ORDER — FUROSEMIDE 20 MG/1
20 TABLET ORAL DAILY
Status: DISCONTINUED | OUTPATIENT
Start: 2024-03-16 | End: 2024-03-16

## 2024-03-16 RX ORDER — PANTOPRAZOLE SODIUM 40 MG/1
40 TABLET, DELAYED RELEASE ORAL 2 TIMES DAILY
Start: 2024-03-16

## 2024-03-16 RX ORDER — FUROSEMIDE 20 MG/1
20 TABLET ORAL
Status: DISCONTINUED | OUTPATIENT
Start: 2024-03-16 | End: 2024-03-17

## 2024-03-16 RX ADMIN — Medication 3 MG: at 21:13

## 2024-03-16 RX ADMIN — AMLODIPINE BESYLATE 5 MG: 5 TABLET ORAL at 17:29

## 2024-03-16 RX ADMIN — WARFARIN SODIUM 2.5 MG: 2.5 TABLET ORAL at 17:29

## 2024-03-16 RX ADMIN — Medication 4000 UNITS: at 09:11

## 2024-03-16 RX ADMIN — GUAIFENESIN 600 MG: 600 TABLET ORAL at 21:13

## 2024-03-16 RX ADMIN — ATORVASTATIN CALCIUM 80 MG: 80 TABLET, FILM COATED ORAL at 21:13

## 2024-03-16 RX ADMIN — PANTOPRAZOLE SODIUM 40 MG: 40 TABLET, DELAYED RELEASE ORAL at 09:11

## 2024-03-16 RX ADMIN — Medication 10 ML: at 21:32

## 2024-03-16 RX ADMIN — Medication 5 DROP: at 21:13

## 2024-03-16 RX ADMIN — GABAPENTIN 100 MG: 100 CAPSULE ORAL at 09:11

## 2024-03-16 RX ADMIN — METOPROLOL SUCCINATE 50 MG: 50 TABLET, EXTENDED RELEASE ORAL at 09:11

## 2024-03-16 RX ADMIN — Medication 10 ML: at 09:12

## 2024-03-16 RX ADMIN — SODIUM BICARBONATE 650 MG: 650 TABLET ORAL at 09:10

## 2024-03-16 RX ADMIN — THIAMINE HCL TAB 100 MG 100 MG: 100 TAB at 09:11

## 2024-03-16 RX ADMIN — SODIUM BICARBONATE 650 MG: 650 TABLET ORAL at 21:13

## 2024-03-16 RX ADMIN — ACETAMINOPHEN 650 MG: 325 TABLET ORAL at 21:13

## 2024-03-16 RX ADMIN — BRIMONIDINE TARTRATE 1 DROP: 2 SOLUTION OPHTHALMIC at 21:12

## 2024-03-16 RX ADMIN — GABAPENTIN 100 MG: 100 CAPSULE ORAL at 21:13

## 2024-03-16 RX ADMIN — PANTOPRAZOLE SODIUM 40 MG: 40 TABLET, DELAYED RELEASE ORAL at 21:13

## 2024-03-16 RX ADMIN — CARBIDOPA AND LEVODOPA 1 TABLET: 25; 100 TABLET ORAL at 21:13

## 2024-03-16 RX ADMIN — FUROSEMIDE 20 MG: 20 TABLET ORAL at 17:29

## 2024-03-16 RX ADMIN — BRIMONIDINE TARTRATE 1 DROP: 2 SOLUTION OPHTHALMIC at 09:11

## 2024-03-16 RX ADMIN — Medication 5 DROP: at 09:11

## 2024-03-16 RX ADMIN — CEFTRIAXONE SODIUM 2 G: 2 INJECTION, SOLUTION INTRAVENOUS at 17:29

## 2024-03-16 RX ADMIN — GUAIFENESIN 600 MG: 600 TABLET ORAL at 09:10

## 2024-03-16 RX ADMIN — CARBIDOPA AND LEVODOPA 1 TABLET: 25; 100 TABLET ORAL at 09:11

## 2024-03-16 ASSESSMENT — COGNITIVE AND FUNCTIONAL STATUS - GENERAL
STANDING UP FROM CHAIR USING ARMS: A LOT
DRESSING REGULAR UPPER BODY CLOTHING: A LOT
PERSONAL GROOMING: A LOT
TOILETING: TOTAL
CLIMB 3 TO 5 STEPS WITH RAILING: TOTAL
TURNING FROM BACK TO SIDE WHILE IN FLAT BAD: A LOT
EATING MEALS: A LOT
DRESSING REGULAR LOWER BODY CLOTHING: A LOT
MOVING TO AND FROM BED TO CHAIR: A LOT
MOBILITY SCORE: 11
HELP NEEDED FOR BATHING: A LOT
MOVING FROM LYING ON BACK TO SITTING ON SIDE OF FLAT BED WITH BEDRAILS: A LOT
DAILY ACTIVITIY SCORE: 11
WALKING IN HOSPITAL ROOM: A LOT

## 2024-03-16 ASSESSMENT — PAIN SCALES - GENERAL
PAINLEVEL_OUTOF10: 0 - NO PAIN
PAINLEVEL_OUTOF10: 8

## 2024-03-16 ASSESSMENT — PAIN DESCRIPTION - ORIENTATION: ORIENTATION: RIGHT

## 2024-03-16 ASSESSMENT — PAIN DESCRIPTION - LOCATION: LOCATION: HAND

## 2024-03-16 ASSESSMENT — PAIN - FUNCTIONAL ASSESSMENT
PAIN_FUNCTIONAL_ASSESSMENT: 0-10

## 2024-03-16 NOTE — CARE PLAN
EOS:    No acute events this shift.     Pt medicated as ordered.    -PM scheduled medications administered three at a time per pt request/preference. Reflected and documented via MAR.    Pt without reports pain/discomfort this shift.     Pt maintained on telemetry displaying NSR, Pt weaned to 1L NC.    Labs drawn this AM and dressing change in Right hand completed at 0600.      Pt with bed alarm active, call light within reach, safety maintained.       The patient's goals for the shift include      The clinical goals for the shift include Get some rest      Problem: Psychosocial Needs  Goal: Collaborate with me, my family, and caregiver to identify my specific goals  3/16/2024 0212 by Lena Grant RN  Outcome: Progressing  3/16/2024 0212 by Lena Grant RN  Outcome: Progressing  3/16/2024 0211 by Lena Grant RN  Outcome: Progressing     Problem: Discharge Barriers  Goal: My discharge needs are met  3/16/2024 0212 by Lena Grant RN  Outcome: Progressing  3/16/2024 0212 by Lena Grant RN  Outcome: Progressing  3/16/2024 0211 by Lena Grant RN  Outcome: Progressing     Problem: Skin  Goal: Promote/optimize nutrition  3/16/2024 0212 by Lena Grant RN  Outcome: Progressing  Flowsheets (Taken 3/16/2024 0212)  Promote/optimize nutrition: Offer water/supplements/favorite foods  3/16/2024 0212 by Lena Grant RN  Outcome: Progressing  Flowsheets (Taken 3/16/2024 0212)  Promote/optimize nutrition: Offer water/supplements/favorite foods  3/16/2024 0211 by Lena Grant RN  Outcome: Progressing  Goal: Decreased wound size/increased tissue granulation at next dressing change  3/16/2024 0212 by Lena Grant RN  Outcome: Progressing  Flowsheets (Taken 3/16/2024 0212)  Decreased wound size/increased tissue granulation at next dressing change:   Promote sleep for wound healing   Protective dressings over bony prominences  3/16/2024 0212 by Lena Grant RN  Outcome: Progressing  Flowsheets (Taken 3/16/2024  0212)  Decreased wound size/increased tissue granulation at next dressing change:   Promote sleep for wound healing   Protective dressings over bony prominences  3/16/2024 0211 by Lena Grant RN  Outcome: Progressing  Goal: Prevent/minimize sheer/friction injuries  3/16/2024 0212 by Lena Grant RN  Outcome: Progressing  Flowsheets (Taken 3/16/2024 0212)  Prevent/minimize sheer/friction injuries:   Use pull sheet   HOB 30 degrees or less   Turn/reposition every 2 hours/use positioning/transfer devices  3/16/2024 0212 by Lena Grant RN  Outcome: Progressing  Flowsheets (Taken 3/16/2024 0212)  Prevent/minimize sheer/friction injuries:   Use pull sheet   HOB 30 degrees or less   Turn/reposition every 2 hours/use positioning/transfer devices  3/16/2024 0211 by Lena Grant RN  Outcome: Progressing  Goal: Promote skin healing  3/16/2024 0212 by Lena Grant RN  Outcome: Progressing  Flowsheets (Taken 3/16/2024 0212)  Promote skin healing:   Turn/reposition every 2 hours/use positioning/transfer devices   Assess skin/pad under line(s)/device(s)   Protective dressings over bony prominences  3/16/2024 0212 by Lena Grant RN  Outcome: Progressing  Flowsheets (Taken 3/16/2024 0212)  Promote skin healing:   Turn/reposition every 2 hours/use positioning/transfer devices   Assess skin/pad under line(s)/device(s)   Protective dressings over bony prominences  3/16/2024 0211 by Lena Grant RN  Outcome: Progressing     Problem: Respiratory  Goal: Clear secretions with interventions this shift  3/16/2024 0212 by Lena Grant RN  Outcome: Progressing  3/16/2024 0212 by Lena Grant RN  Outcome: Progressing  Goal: Minimal/no exertional discomfort or dyspnea this shift  3/16/2024 0212 by Lena Grant RN  Outcome: Progressing  3/16/2024 0212 by Lena Grant RN  Outcome: Progressing  Goal: No signs of respiratory distress (eg. Use of accessory muscles. Peds grunting)  3/16/2024 0212 by Lena Grant RN  Outcome:  Progressing  3/16/2024 0212 by Lena Grant, RN  Outcome: Progressing

## 2024-03-16 NOTE — PROGRESS NOTES
"INPATIENT PROGRESS NOTES    PATIENT NAME: Vamsi Benson    MRN: [id number]  SERVICE DATE:  3/16/2024   SERVICE TIME:  4:16 PM    SIGNATURE: Aneudy Raymond MD      SUBJECTIVE  Afebrile  No events overnight       OBJECTIVE  PHYSICAL EXAM:   Patient Vitals for the past 24 hrs:   BP Temp Temp src Pulse Resp SpO2 Weight   03/16/24 1200 159/81 36.9 °C (98.4 °F) -- 78 16 97 % --   03/16/24 0800 (!) 138/91 36.3 °C (97.3 °F) -- 65 16 98 % --   03/16/24 0600 -- -- -- -- -- 97 % 87.9 kg (193 lb 12.8 oz)   03/16/24 0400 164/85 36.2 °C (97.2 °F) Temporal 88 18 99 % --   03/16/24 0000 154/84 36.6 °C (97.9 °F) Temporal 72 18 100 % --   03/15/24 2000 160/88 36.3 °C (97.3 °F) Temporal 75 18 99 % --         Gen: NAD  Neck: symmetric, no mass  Cardiovascular: RRR  Respiratory: No distress   Extremities: Right hand is wrapped  Skin: Warm and dry.      Labs:  Lab Results   Component Value Date    WBC 5.6 03/16/2024    HGB 8.1 (L) 03/16/2024    HCT 25.9 (L) 03/16/2024    MCV 94 03/16/2024     03/16/2024     Lab Results   Component Value Date    GLUCOSE 123 (H) 03/16/2024    CALCIUM 7.9 (L) 03/16/2024     03/16/2024    K 3.6 03/16/2024    CO2 28 03/16/2024     03/16/2024    BUN 36 (H) 03/16/2024    CREATININE 1.94 (H) 03/16/2024   ESR: --No results found for: \"SEDRATE\"  Lab Results   Component Value Date    CRP 6.16 (H) 03/09/2024     Lab Results   Component Value Date    ALT <3 (L) 03/13/2024    AST 11 03/13/2024    ALKPHOS 50 03/13/2024    BILITOT 0.3 03/13/2024         DATA:   Diagnostic tests reviewed for today's visit:    Labs this admission reviewed  Imagings this admission reviewed  Cultures: Reviewed        ASSESSMENT :   -Recent right hand abscess with tenosynovitis status post I&D on 2/23 at [facility]  -Recent group A strep bacteremia  -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  Reported receiving 2 doses of IM penicillin and received a third dose third dose on 3/11  -Acute anemia  -Positive urine culture " for Candida albicans without symptoms likely colonization  -History of A-fib, renal failure, hypertension     PLAN:  -Continue ceftriaxone 2 g IV every 24 hours through 3/27  -Labs reviewed, no FRANCIE, no hepatitis, no thrombocytopenia  -Will continue to monitor closely for antibiotic side effects or drug drug interaction  -Repeat RPR in 3 to 6 months    The plan was discussed with the pharmacy team    Aneudy Raymond MD.   Infectious Diseases Attending

## 2024-03-16 NOTE — NURSING NOTE
Patient with no major changes this shift. Patient with poor appetite and not eating much of meals. Patient not understanding thickened liquid diet and frequently asking for regular water. Patient educated throughout day about need for thickened liquid per speech. Patient takes pills 1 at a time with thickened liquid.

## 2024-03-16 NOTE — PROGRESS NOTES
INPATIENT NEPHROLOGY CONSULT PROGRESS NOTE      Patient Name: Vamsi Benson MRN: [id number]  DATE of SERVICE: March 16, 2024  TIME of SERVICE: 10:40 AM  CONSULTING SERVICE: Nephrology    REASON for CONSULT: FRANCIE    SUBJECTIVE:  Seen and evaluated at bedside,   Reports that he has been having mild shortness of breath.  Otherwise denies any chest pain, nausea, vomiting, abdominal pain, headache, dizziness.    SUMMARY OF STAY:  Mr. Benson is a 78-year-old male who presented to [facility] March 8, 2024 for evaluation of GI bleed complicated with acute kidney injury.  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  Recent evaluation of right upper extremity swelling was negative for DVT.  History of GI bleed status post EGD March 4, 2024 which demonstrated 3 nonbleeding ulcers in swelling of gastric outlet.  Patient presented to [facility] from [facility] for evaluation of acute blood loss anemia with drop in hemoglobin to 7 mg/dL associated with melena for 2 to 3 days prior to admission in addition to poor oral intake for 4 days.      ASSESSMENT:  1.  FRANCIE secondary to hemodynamically mediated in the setting of acute blood loss anemia with GI bleed and group A streptococcal bacteremia.  2.  Hypervolemia  3.  Hyperkalemia, improving.  4.  Metabolic acidosis, improving.  5.  Acute blood loss anemia.  6.  UTI.    Plan.  -Renal function has  been stable with creatinine around 1.8 and 1.9 mg/dL and has been diuresing well with urine output of 2.2 L in the last 24 hours and respiratory status has been improving.  -Will repeat chest x-ray to assess further diuresis.  Will continue Lasix 20 mg p.o. twice daily for now.  -Hypertension: Blood pressure has been suboptimally controlled with systolic mostly in 130s to 160s will start on amlodipine 5 mg p.o. daily and assess response.  -Reviewed echocardiogram from 3/10, has a normal ejection fraction and moderate pleural effusion along with moderate mitral valve regurgitation with elevated RVSP.  -Remains on sodium bicarbonate 650 mg twice daily.  -Ceftriaxone 2 g IV daily     Will follow, thank you!        Medications:    Current Facility-Administered Medications:     acetaminophen (Tylenol) tablet 650 mg, 650 mg, oral, q4h PRN, BRENDA Navarro, 650 mg at 03/13/24 2321    atorvastatin (Lipitor) tablet 80 mg, 80 mg, oral, Nightly, BRENDA Navarro, 80 mg at 03/15/24 2245    brimonidine (AlphaGAN) 0.2 % ophthalmic solution 1 drop, 1 drop, Right Eye, BID, BRENDA Navarro, 1 drop at 03/16/24 0911    carbamide peroxide (Debrox) 6.5 % otic solution 5 drop, 5 drop, Left Ear, BID, BRENDA Landeros, 5 drop at 03/16/24 0911    carbidopa-levodopa (Sinemet)  mg per tablet 1 tablet, 1 tablet, oral, BID, BRENDA Navarro, 1 tablet at 03/16/24 0911    cefTRIAXone (Rocephin) 2 g IV in dextrose 5% 50 mL, 2 g, intravenous, q24h, BRENDA Navarro, Stopped at 03/15/24 1819    cholecalciferol (Vitamin D-3) tablet 4,000 Units, 4,000 Units, oral, Daily, BRENDA Navarro, 4,000 Units at 03/16/24 0911    dextrose 10 % in water (D10W) infusion, 0.3 g/kg/hr, intravenous, Once PRN, BRENDA Moulton    dextrose 50 % injection 25 g, 25 g, intravenous, q15 min PRN, Oralia Gramajo, APRN-CNP    furosemide (Lasix) tablet 20 mg, 20 mg, oral, BID with meals,  Abena Vincent MD    gabapentin (Neurontin) capsule 100 mg, 100 mg, oral, BID, BRENDA Navarro, 100 mg at 03/16/24 0911    [Held by provider] glipiZIDE (Glucotrol) tablet 2.5 mg, 2.5 mg, oral, BID AC, BRENDA Navarro    glucagon (Glucagen) injection 1 mg, 1 mg, intramuscular, q15 min PRN, BRENDA Moulton    guaiFENesin (Mucinex) 12 hr tablet 600 mg, 600 mg, oral, BID, BRENDA Navarro, 600 mg at 03/16/24 0910    insulin lispro (HumaLOG) injection 0-5 Units, 0-5 Units, subcutaneous, Before meals & nightly, BRENDA Moulton, 1 Units at 03/14/24 2140    [Held by provider] insulin lispro (HumaLOG) injection 4 Units, 4 Units, subcutaneous, Before meals & nightly, BRENDA Navarro    ipratropium-albuteroL (Duo-Neb) 0.5-2.5 mg/3 mL nebulizer solution 3 mL, 3 mL, nebulization, q2h PRN, BRENDA Navarro, 3 mL at 03/14/24 0738    lidocaine PF (Xylocaine) 10 mg/mL (1 %) injection 50 mg, 5 mL, infiltration, Once, BRENDA Landeros    melatonin tablet 3 mg, 3 mg, oral, Daily PRN, BRENDA Navarro, 3 mg at 03/13/24 2321    metoprolol succinate XL (Toprol-XL) 24 hr tablet 50 mg, 50 mg, oral, Daily, BRENDA Navarro, 50 mg at 03/16/24 0911    oxygen (O2) therapy, , inhalation, Continuous PRN - O2/gases, BRENDA Navarro, 1 L/min at 03/16/24 0600    pantoprazole (ProtoNix) EC tablet 40 mg, 40 mg, oral, BID, BRENDA Moulton, 40 mg at 03/16/24 0911    polyethylene glycol (Glycolax, Miralax) packet 17 g, 17 g, oral, Daily PRN, BRENDA Navarro    sodium bicarbonate tablet 650 mg, 650 mg, oral, BID, Dylon Marin MD, 650 mg at 03/16/24 0910    sodium chloride 0.9% flush 10 mL, 10 mL, intravenous, BID, BRENDA Navarro, 10 mL at 03/16/24 0912    thiamine (Vitamin B-1) tablet 100 mg, 100 mg, oral, Daily, BRENDA Navarro, 100 mg at 03/16/24 0911    warfarin (Coumadin) tablet 2.5 mg, 2.5 mg, oral,  Daily, Oralia Gramajo, APRN-CNP, 2.5 mg at 03/15/24 0327    PERTINENT ROS:  GENERAL:  positive for fatigue, poor appetite.  No fever/chills  RESPIRATORY:  positive for shortness of breath.  Negative for cough, wheezing.  CARDIOVASCULAR:   Negative for chest pain or palpitation.  GI:  Negative for abdominal pain, diarrhea, heartburn, nausea, vomiting  : Indwelling Conner catheter in place    Physical Exam:  Vital signs in last 24 hours:  Temp:  [36.2 °C (97.2 °F)-36.9 °C (98.4 °F)] 36.9 °C (98.4 °F)  Heart Rate:  [65-88] 78  Resp:  [16-18] 16  BP: (138-164)/(80-91) 159/81    General: Awake, cooperative, not in acute distress, right central catheter in place with no signs of infection at exit site.  HEENT:  NCAT,  mucous membranes moist and pink  NECK:  Elevated JVD, no carotid bruit, supple, no cervical mass or thyromegaly  LUNGS;  Diminished breath sounds, fine Rales  CV:  Distant, regular rate and rhythm, + murmurs  ABDOMEN:  abdomen soft, nontender, ongoing GI bleed  EDEMA:  trace lower extremity edema/+dependent edema  SKIN: Right breast with dressing in place  : Indwelling Conner catheter in place    Intake/Output last 3 shifts:  I/O last 3 completed shifts:  In: 530 (6 mL/kg) [P.O.:480; IV Piggyback:50]  Out: 3300 (37.5 mL/kg) [Urine:3300 (1 mL/kg/hr)]  Weight: 87.9 kg     DATA:  Diagnotic tests reviewed for Todays visit:  Results from last 7 days   Lab Units 03/16/24  0611   WBC AUTO x10*3/uL 5.6   RBC AUTO x10*6/uL 2.75*   HEMOGLOBIN g/dL 8.1*   HEMATOCRIT % 25.9*     Results from last 7 days   Lab Units 03/16/24  0611 03/14/24  0518 03/13/24  1555   SODIUM mmol/L 141   < >  --    POTASSIUM mmol/L 3.6   < >  --    CHLORIDE mmol/L 107   < >  --    CO2 mmol/L 28   < >  --    BUN mg/dL 36*   < >  --    CREATININE mg/dL 1.94*   < >  --    CALCIUM mg/dL 7.9*   < >  --    MAGNESIUM mg/dL 1.77   < >  --    BILIRUBIN TOTAL mg/dL  --   --  0.3   ALT U/L  --   --  <3*   AST U/L  --   --  11    < > = values in this  interval not displayed.           IMAGING: CXR reviewed in  images      SIGNATURE: Abena Vincent MD  Nephrology and Hypertension  [address]  Office phone: [phone]  FAX: [phone]    This note was partially generated using the Dragon voice recognition system, and there may be some incorrect words, spelling's and punctuation that were not noted in checking the note before saving.

## 2024-03-16 NOTE — PROGRESS NOTES
MRN: [id number]  SERVICE DATE:  3/16/2024   SERVICE TIME:  12:38 PM  Today's Date: 03/16/24  Admission Date: 3/8/2024  Hospital Day: #8    Subjective        Patient seen today resting comfortably.  No significant changes.      Objective      ROS:   General: Denies any change in weight, fever, chills, fatigue.   Head and Neck: Denies any headaches, syncope or history of head injury.   Eyes/Ears: Denies any cataracts, glaucoma, blurred vision, tinnitus.   Nose: Denies any epistaxis or sinus problems   Respiratory: Denies any cough, hemoptysis, wheezing, asthma, dyspnea.   Cardiovascular: No orthopnea, dyspnea, palpitations, congestive heart failure.   Gastrointestinal: Denies any indigestion, nausea, vomiting, diarrhea, constipation.   Neuro: No dizzyness, headache or syncope   ID: No fever or chills.   Endocrine: No weight loss or weight gain.  No thyroid or diabetic complaints     MEDICATIONS:  Current Facility-Administered Medications   Medication Dose Route Frequency Provider Last Rate Last Admin    acetaminophen (Tylenol) tablet 650 mg  650 mg oral q4h PRN JASON NavarroCNP   650 mg at 03/13/24 2321    atorvastatin (Lipitor) tablet 80 mg  80 mg oral Nightly JASON NavarroCNP   80 mg at 03/15/24 2245    brimonidine (AlphaGAN) 0.2 % ophthalmic solution 1 drop  1 drop Right Eye BID JASON NavarroCNP   1 drop at 03/16/24 0911    carbamide peroxide (Debrox) 6.5 % otic solution 5 drop  5 drop Left Ear BID JASON LanderosCNP   5 drop at 03/16/24 0911    carbidopa-levodopa (Sinemet)  mg per tablet 1 tablet  1 tablet oral BID JASON NavarroCNP   1 tablet at 03/16/24 0911    cefTRIAXone (Rocephin) 2 g IV in dextrose 5% 50 mL  2 g intravenous q24h BRENDA Navarro   Stopped at 03/15/24 1819    cholecalciferol (Vitamin D-3) tablet 4,000 Units  4,000 Units oral Daily JASON NavarroCNP   4,000 Units at 03/16/24 0911    dextrose 10 % in water (D10W) infusion  0.3 g/kg/hr  intravenous Once PRN BRENDA Moulton        dextrose 50 % injection 25 g  25 g intravenous q15 min PRN BRENDA Moulton        furosemide (Lasix) tablet 20 mg  20 mg oral q48h Dylon Marin MD        gabapentin (Neurontin) capsule 100 mg  100 mg oral BID BRENDA Navarro   100 mg at 03/16/24 0911    [Held by provider] glipiZIDE (Glucotrol) tablet 2.5 mg  2.5 mg oral BID AC BRENDA Navarro        glucagon (Glucagen) injection 1 mg  1 mg intramuscular q15 min PRN BRENDA Moulton        guaiFENesin (Mucinex) 12 hr tablet 600 mg  600 mg oral BID BRENDA Navarro   600 mg at 03/16/24 0910    insulin lispro (HumaLOG) injection 0-5 Units  0-5 Units subcutaneous Before meals & nightly BRENDA Moulton   1 Units at 03/14/24 2140    [Held by provider] insulin lispro (HumaLOG) injection 4 Units  4 Units subcutaneous Before meals & nightly BRENDA Navarro        ipratropium-albuteroL (Duo-Neb) 0.5-2.5 mg/3 mL nebulizer solution 3 mL  3 mL nebulization q2h PRN BRENDA Navarro   3 mL at 03/14/24 0738    lidocaine PF (Xylocaine) 10 mg/mL (1 %) injection 50 mg  5 mL infiltration Once BRENDA Landeros        melatonin tablet 3 mg  3 mg oral Daily PRN BRENDA Navarro   3 mg at 03/13/24 2321    metoprolol succinate XL (Toprol-XL) 24 hr tablet 50 mg  50 mg oral Daily BRENDA Navarro   50 mg at 03/16/24 0911    oxygen (O2) therapy   inhalation Continuous PRN - O2/gases BRENDA Navarro   1 L/min at 03/16/24 0600    pantoprazole (ProtoNix) EC tablet 40 mg  40 mg oral BID BRENDA Moulton   40 mg at 03/16/24 0911    polyethylene glycol (Glycolax, Miralax) packet 17 g  17 g oral Daily PRN Crystal A Curran, APRN-CNP        sodium bicarbonate tablet 650 mg  650 mg oral BID Dylon Marin MD   650 mg at 03/16/24 0910    sodium chloride 0.9% flush 10 mL  10 mL intravenous BID Candy Curran, APRN-CNP   " 10 mL at 03/16/24 0912    thiamine (Vitamin B-1) tablet 100 mg  100 mg oral Daily Candy Curran, APRN-CNP   100 mg at 03/16/24 0911    warfarin (Coumadin) tablet 2.5 mg  2.5 mg oral Daily Oralia Gramajo APRN-CNP   2.5 mg at 03/15/24 1747         PHYSICAL EXAM:   /81   Pulse 78   Temp 36.9 °C (98.4 °F)   Resp 16   Ht 1.88 m (6' 2\")   Wt 87.9 kg (193 lb 12.8 oz)   SpO2 97%   BMI 24.88 kg/m²      CONSTITUTIONAL - well nourished, well developed, looks like stated age, in no acute distress, not ill-appearing   SKIN - normal skin color and pigmentation, normal skin turgor without rash   HEAD - no trauma, normocephalic  EYES - pupils are equal and reactive to light, extraocular muscles are intact, and normal external exam  ENT - TM's intact, no injection, no signs of infection, uvula midline, normal tongue movement and throat normal  NECK - supple without rigidity, no neck mass was observed, no thyromegaly    CHEST - clear to auscultation, no wheezing, no crackles and no rales, good effort  CARDIAC - regular rate and regular rhythm, no skipped beats, no murmur  ABDOMEN - no organomegaly, soft, nontender, nondistended, normal bowel sounds   NEUROLOGICAL - normal gait, normal balance, normal motor, no ataxia, DTRs equal and symmetrical; alert, oriented and no focal signs  PSYCHIATRIC - alert, pleasant and cordial, age-appropriate  IMMUNOLOGIC - no cervical lymphadenopathy       Labs:  Lab Results   Component Value Date    WBC 5.6 03/16/2024    HGB 8.1 (L) 03/16/2024    HCT 25.9 (L) 03/16/2024    MCV 94 03/16/2024     03/16/2024     Lab Results   Component Value Date    GLUCOSE 123 (H) 03/16/2024    CALCIUM 7.9 (L) 03/16/2024     03/16/2024    K 3.6 03/16/2024    CO2 28 03/16/2024     03/16/2024    BUN 36 (H) 03/16/2024    CREATININE 1.94 (H) 03/16/2024   ESR: --No results found for: \"SEDRATE\"  Lab Results   Component Value Date    CRP 6.16 (H) 03/09/2024     Lab Results   Component Value " Date    ALT <3 (L) 03/13/2024    AST 11 03/13/2024    ALKPHOS 50 03/13/2024    BILITOT 0.3 03/13/2024                      Problem List Items Addressed This Visit             ICD-10-CM    * (Principal) Gastrointestinal hemorrhage with melena - Primary K92.1    Extensor tenosynovitis of right wrist M65.831    Relevant Medications    cefTRIAXone (Rocephin) 2 gram/50 mL IV    Other Relevant Orders    Vascular US upper extremity venous duplex right (Completed)    CAD (coronary artery disease) (Chronic) I25.10    Relevant Medications    metoprolol succinate XL (Toprol-XL) 50 mg 24 hr tablet    nitroglycerin (Nitrostat) 0.4 mg SL tablet    metoprolol succinate XL (Toprol-XL) 24 hr tablet 50 mg    Other Relevant Orders    Transthoracic Echo (TTE) Complete (Completed)    Atrial fibrillation (CMS/HCC) I48.91    Relevant Medications    metoprolol succinate XL (Toprol-XL) 50 mg 24 hr tablet    nitroglycerin (Nitrostat) 0.4 mg SL tablet    metoprolol succinate XL (Toprol-XL) 24 hr tablet 50 mg    Other Relevant Orders    Transthoracic Echo (TTE) Complete (Completed)    FRANCIE (acute kidney injury) (CMS/HCC) N17.9    Relevant Orders    Transthoracic Echo (TTE) Complete (Completed)    Pleural effusion, bilateral J90    Relevant Orders    Transthoracic Echo (TTE) Complete (Completed)    Swelling of extremity, right M79.89    Relevant Orders    Vascular US upper extremity venous duplex right (Completed)     Other Visit Diagnoses         Codes    Subtherapeutic international normalized ratio (INR)     R79.1    Normocytic normochromic anemia     D64.9    Bilateral pleural effusion     J90    Uremic acidosis     N25.89                 Malnutrition Diagnosis Status: New  Malnutrition Diagnosis: Severe malnutrition related to acute disease or injury  As Evidenced by: acute on chronic nutritional stress in the setting of recurrent GI bleed and new dysphagia with indicators including NFPE showing signs of severe muscle wasting/fat loss and  intakes meeting <50% of needs for > 5 days.  I agree with the dietitian's malnutrition diagnosis.    Monitor H&H  Meds have been reviewed  A-fib rate controlled  Consult notes reviewed    Maxim Hickey MD  This note was created using [employer] Any inadvertent grammar, spelling or syntax errors are do to voice recognition software error and are not intentional.

## 2024-03-17 LAB
ALBUMIN SERPL BCP-MCNC: 2 G/DL (ref 3.4–5)
ANION GAP SERPL CALC-SCNC: 9 MMOL/L (ref 10–20)
BUN SERPL-MCNC: 35 MG/DL (ref 6–23)
CALCIUM SERPL-MCNC: 7.8 MG/DL (ref 8.6–10.3)
CHLORIDE SERPL-SCNC: 106 MMOL/L (ref 98–107)
CO2 SERPL-SCNC: 29 MMOL/L (ref 21–32)
CREAT SERPL-MCNC: 1.89 MG/DL (ref 0.5–1.3)
EGFRCR SERPLBLD CKD-EPI 2021: 36 ML/MIN/1.73M*2
ERYTHROCYTE [DISTWIDTH] IN BLOOD BY AUTOMATED COUNT: 16.1 % (ref 11.5–14.5)
FERRITIN SERPL-MCNC: 377 NG/ML (ref 20–300)
GLUCOSE BLD MANUAL STRIP-MCNC: 117 MG/DL (ref 74–99)
GLUCOSE BLD MANUAL STRIP-MCNC: 119 MG/DL (ref 74–99)
GLUCOSE BLD MANUAL STRIP-MCNC: 124 MG/DL (ref 74–99)
GLUCOSE BLD MANUAL STRIP-MCNC: 134 MG/DL (ref 74–99)
GLUCOSE SERPL-MCNC: 122 MG/DL (ref 74–99)
HCT VFR BLD AUTO: 25.2 % (ref 41–52)
HGB BLD-MCNC: 7.9 G/DL (ref 13.5–17.5)
INR PPP: 1.4 (ref 0.9–1.1)
IRON SATN MFR SERPL: 15 % (ref 25–45)
IRON SERPL-MCNC: 18 UG/DL (ref 35–150)
MAGNESIUM SERPL-MCNC: 1.68 MG/DL (ref 1.6–2.4)
MCH RBC QN AUTO: 29.8 PG (ref 26–34)
MCHC RBC AUTO-ENTMCNC: 31.3 G/DL (ref 32–36)
MCV RBC AUTO: 95 FL (ref 80–100)
NRBC BLD-RTO: 0 /100 WBCS (ref 0–0)
PHOSPHATE SERPL-MCNC: 3.3 MG/DL (ref 2.5–4.9)
PLATELET # BLD AUTO: 150 X10*3/UL (ref 150–450)
POTASSIUM SERPL-SCNC: 3.5 MMOL/L (ref 3.5–5.3)
PROTHROMBIN TIME: 15.8 SECONDS (ref 9.8–12.8)
RBC # BLD AUTO: 2.65 X10*6/UL (ref 4.5–5.9)
SODIUM SERPL-SCNC: 140 MMOL/L (ref 136–145)
TIBC SERPL-MCNC: 120 UG/DL (ref 240–445)
UIBC SERPL-MCNC: 102 UG/DL (ref 110–370)
WBC # BLD AUTO: 6 X10*3/UL (ref 4.4–11.3)

## 2024-03-17 PROCEDURE — 2500000001 HC RX 250 WO HCPCS SELF ADMINISTERED DRUGS (ALT 637 FOR MEDICARE OP): Performed by: NURSE PRACTITIONER

## 2024-03-17 PROCEDURE — 85610 PROTHROMBIN TIME: CPT | Performed by: NURSE PRACTITIONER

## 2024-03-17 PROCEDURE — 82947 ASSAY GLUCOSE BLOOD QUANT: CPT

## 2024-03-17 PROCEDURE — 2500000004 HC RX 250 GENERAL PHARMACY W/ HCPCS (ALT 636 FOR OP/ED): Performed by: NURSE PRACTITIONER

## 2024-03-17 PROCEDURE — 83540 ASSAY OF IRON: CPT | Performed by: NURSE PRACTITIONER

## 2024-03-17 PROCEDURE — 2500000004 HC RX 250 GENERAL PHARMACY W/ HCPCS (ALT 636 FOR OP/ED): Performed by: INTERNAL MEDICINE

## 2024-03-17 PROCEDURE — 85027 COMPLETE CBC AUTOMATED: CPT | Performed by: NURSE PRACTITIONER

## 2024-03-17 PROCEDURE — 2500000002 HC RX 250 W HCPCS SELF ADMINISTERED DRUGS (ALT 637 FOR MEDICARE OP, ALT 636 FOR OP/ED): Performed by: NURSE PRACTITIONER

## 2024-03-17 PROCEDURE — 2500000001 HC RX 250 WO HCPCS SELF ADMINISTERED DRUGS (ALT 637 FOR MEDICARE OP): Performed by: INTERNAL MEDICINE

## 2024-03-17 PROCEDURE — 1200000002 HC GENERAL ROOM WITH TELEMETRY DAILY

## 2024-03-17 PROCEDURE — 82728 ASSAY OF FERRITIN: CPT | Performed by: NURSE PRACTITIONER

## 2024-03-17 PROCEDURE — 80069 RENAL FUNCTION PANEL: CPT | Performed by: INTERNAL MEDICINE

## 2024-03-17 PROCEDURE — 83735 ASSAY OF MAGNESIUM: CPT | Performed by: NURSE PRACTITIONER

## 2024-03-17 RX ORDER — FUROSEMIDE 40 MG/1
40 TABLET ORAL ONCE
Status: COMPLETED | OUTPATIENT
Start: 2024-03-17 | End: 2024-03-17

## 2024-03-17 RX ORDER — AMLODIPINE BESYLATE 5 MG/1
5 TABLET ORAL 2 TIMES DAILY
Status: DISCONTINUED | OUTPATIENT
Start: 2024-03-17 | End: 2024-03-19 | Stop reason: HOSPADM

## 2024-03-17 RX ORDER — FUROSEMIDE 40 MG/1
40 TABLET ORAL
Status: DISCONTINUED | OUTPATIENT
Start: 2024-03-17 | End: 2024-03-18

## 2024-03-17 RX ADMIN — ATORVASTATIN CALCIUM 80 MG: 80 TABLET, FILM COATED ORAL at 21:26

## 2024-03-17 RX ADMIN — ACETAMINOPHEN 650 MG: 325 TABLET ORAL at 03:26

## 2024-03-17 RX ADMIN — CARBIDOPA AND LEVODOPA 1 TABLET: 25; 100 TABLET ORAL at 21:25

## 2024-03-17 RX ADMIN — Medication 10 ML: at 21:29

## 2024-03-17 RX ADMIN — WARFARIN SODIUM 2.5 MG: 2.5 TABLET ORAL at 17:09

## 2024-03-17 RX ADMIN — BRIMONIDINE TARTRATE 1 DROP: 2 SOLUTION OPHTHALMIC at 21:27

## 2024-03-17 RX ADMIN — FUROSEMIDE 40 MG: 40 TABLET ORAL at 17:09

## 2024-03-17 RX ADMIN — GABAPENTIN 100 MG: 100 CAPSULE ORAL at 21:25

## 2024-03-17 RX ADMIN — AMLODIPINE BESYLATE 5 MG: 5 TABLET ORAL at 21:25

## 2024-03-17 RX ADMIN — Medication 10 ML: at 08:32

## 2024-03-17 RX ADMIN — FUROSEMIDE 40 MG: 40 TABLET ORAL at 14:07

## 2024-03-17 RX ADMIN — CEFTRIAXONE SODIUM 2 G: 2 INJECTION, SOLUTION INTRAVENOUS at 17:09

## 2024-03-17 ASSESSMENT — COGNITIVE AND FUNCTIONAL STATUS - GENERAL
HELP NEEDED FOR BATHING: A LOT
PERSONAL GROOMING: A LOT
TURNING FROM BACK TO SIDE WHILE IN FLAT BAD: A LOT
MOVING TO AND FROM BED TO CHAIR: A LOT
DAILY ACTIVITIY SCORE: 11
WALKING IN HOSPITAL ROOM: A LOT
MOVING FROM LYING ON BACK TO SITTING ON SIDE OF FLAT BED WITH BEDRAILS: A LOT
CLIMB 3 TO 5 STEPS WITH RAILING: TOTAL
STANDING UP FROM CHAIR USING ARMS: A LOT
TOILETING: TOTAL
MOBILITY SCORE: 11
DRESSING REGULAR LOWER BODY CLOTHING: A LOT
DRESSING REGULAR UPPER BODY CLOTHING: A LOT
EATING MEALS: A LOT

## 2024-03-17 ASSESSMENT — PAIN - FUNCTIONAL ASSESSMENT: PAIN_FUNCTIONAL_ASSESSMENT: 0-10

## 2024-03-17 ASSESSMENT — PAIN SCALES - GENERAL
PAINLEVEL_OUTOF10: 0 - NO PAIN
PAINLEVEL_OUTOF10: 7
PAINLEVEL_OUTOF10: 0 - NO PAIN

## 2024-03-17 ASSESSMENT — PAIN DESCRIPTION - LOCATION: LOCATION: HAND

## 2024-03-17 ASSESSMENT — PAIN DESCRIPTION - ORIENTATION: ORIENTATION: RIGHT

## 2024-03-17 NOTE — NURSING NOTE
Patient refused to take AM medications stating he just had them at 4AM. Patient only had Tylenol at 4AM and patient reminded of that. Patient adamant he took all his AM meds then and felt if he took them at 0900 then he would  be double dosing and would not take them. Patient did allow nurse to do dressing change to right hand after refusing. Patient with very poor PO intake.     Patient refused lunch and states he does not want dinner. Patient not understanding diet and thickened liquids. Patient needing constant education about why he cannot have thin liquids. Patient refusing turns.     Patient refused dinner. Patient again questioning why he cannot have regular food and thin liquids and needed explanation why he was on given diet. Patient having a hard time swallowing pills, he refuses them to be crushed or given in applesauce. Patient wants pills whole or cut in half and then drinking thickened liquid. Patient educated on pills might be better if they were in applesauce or crushed. Patient still refusing.

## 2024-03-17 NOTE — PROGRESS NOTES
"INPATIENT PROGRESS NOTES    PATIENT NAME: Vamsi Benson    MRN: [id number]  SERVICE DATE:  3/17/2024   SERVICE TIME:  1:27 PM    SIGNATURE: Aneudy Raymond MD      SUBJECTIVE  Afebrile  No events overnight       OBJECTIVE  PHYSICAL EXAM:   Patient Vitals for the past 24 hrs:   BP Temp Temp src Pulse Resp SpO2 Weight   03/17/24 1200 163/87 37 °C (98.6 °F) -- 84 18 96 % --   03/17/24 0800 139/84 36.4 °C (97.5 °F) -- 80 18 96 % --   03/17/24 0559 -- -- -- -- -- -- 85.7 kg (188 lb 15 oz)   03/17/24 0400 168/76 36.6 °C (97.9 °F) Temporal 60 17 97 % --   03/17/24 0000 119/68 36.3 °C (97.3 °F) Temporal 69 15 99 % --   03/16/24 2000 167/88 36.4 °C (97.5 °F) Temporal 70 16 98 % --   03/16/24 1600 153/84 36.7 °C (98.1 °F) -- 67 16 97 % --         Gen: NAD  Neck: symmetric, no mass  Cardiovascular: RRR  Respiratory: No distress   Extremities: Right hand is wrapped  Skin: Warm and dry.      Labs:  Lab Results   Component Value Date    WBC 6.0 03/17/2024    HGB 7.9 (L) 03/17/2024    HCT 25.2 (L) 03/17/2024    MCV 95 03/17/2024     03/17/2024     Lab Results   Component Value Date    GLUCOSE 122 (H) 03/17/2024    CALCIUM 7.8 (L) 03/17/2024     03/17/2024    K 3.5 03/17/2024    CO2 29 03/17/2024     03/17/2024    BUN 35 (H) 03/17/2024    CREATININE 1.89 (H) 03/17/2024   ESR: --No results found for: \"SEDRATE\"  Lab Results   Component Value Date    CRP 6.16 (H) 03/09/2024     Lab Results   Component Value Date    ALT <3 (L) 03/13/2024    AST 11 03/13/2024    ALKPHOS 50 03/13/2024    BILITOT 0.3 03/13/2024         DATA:   Diagnostic tests reviewed for today's visit:    Labs this admission reviewed  Imagings this admission reviewed  Cultures: Reviewed        ASSESSMENT :   -Recent right hand abscess with tenosynovitis status post I&D on 2/23 at [facility]  -Recent group A strep bacteremia  -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  Reported receiving 2 doses of IM penicillin and received a third dose third dose " on 3/11.  Repeat RPR in 3 to 6 weeks  -Acute anemia  -Positive urine culture for Candida albicans without symptoms likely colonization  -History of A-fib, renal failure, hypertension     PLAN:  -Tolerating ceftriaxone with no side effect  -Labs reviewed no thrombocytopenia FRANCIE  -No reported diarrhea      Aneudy Raymond MD.   Infectious Diseases Attending

## 2024-03-17 NOTE — NURSING NOTE
EOS note:  No acute changes this shift.  Pt's son had many questions regarding discharge readiness.  Currently awaiting a bed at [facility] with possible discharge to [facility] pending family decision.  Pt.still with poor intake and appetite.  Tolerated nectar thick liquids well this shift.  O2 weaned from 1L NC and sating well.   Dressing to R chest PICC line changed this a.m. and is working well.  IV antibiotic continued per order.   Pt. Not wanting R hand dressing changed as he wanted the doctor to see the wound prior to replacing the bandage.  Will make day nurse aware.

## 2024-03-17 NOTE — PROGRESS NOTES
INPATIENT NEPHROLOGY CONSULT PROGRESS NOTE      Patient Name: Vamsi Benson MRN: [id number]  DATE of SERVICE: March 17, 2024  TIME of SERVICE: 10:40 AM  CONSULTING SERVICE: Nephrology    REASON for CONSULT: FRANCIE    SUBJECTIVE:  Seen and evaluated at bedside,   Discussed with the nurse and patient apparently has been refusing medications.  I have discussed with him to take his medications and he agrees to take furosemide.  Otherwise denies any chest pain, shortness of breath, nausea, vomitings.    SUMMARY OF STAY:  Mr. Benson is a 78-year-old male who presented to [facility] March 8, 2024 for evaluation of GI bleed complicated with acute kidney injury.  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  Recent evaluation of right upper extremity swelling was negative for DVT.  History of GI bleed status post EGD March 4, 2024 which demonstrated 3 nonbleeding ulcers in swelling of gastric outlet.  Patient presented to [facility] from [facility] for evaluation of acute blood loss anemia with drop in hemoglobin to 7 mg/dL associated with melena for 2 to 3 days prior to admission in addition to poor oral intake for 4 days.      ASSESSMENT:  1.  FRANCIE secondary to hemodynamically mediated in the setting of acute blood loss anemia with GI bleed and group A streptococcal bacteremia.  2.  Hypervolemia  3.  Hyperkalemia, improving.  4.  Metabolic  acidosis, improving.  5.  Acute blood loss anemia.  6.  UTI.    Plan.  -Renal function has been slowly improving, creatinine is down to 1.8 mg/dL and remains nonoliguric with urine output of 1 L in the last 24 hours and ins and outs significant for net 6.9 L negative since admission.  Weight is down by 12 pounds since admission but a chest x-ray has worsening infiltrates.  Will increase Lasix to 40 mg p.o. twice daily and assess response.  -Hypertension: Blood pressure has been suboptimally controlled with systolic ranging from 150s to 160s, increase amlodipine to 5 mg twice daily.  -Reviewed echocardiogram from 3/10, has a normal ejection fraction and moderate pleural effusion along with moderate mitral valve regurgitation with elevated RVSP.  -Remains on sodium bicarbonate 650 mg twice daily.  -Ceftriaxone 2 g IV daily     Will follow, thank you!        Medications:    Current Facility-Administered Medications:     acetaminophen (Tylenol) tablet 650 mg, 650 mg, oral, q4h PRN, BRENDA Navarro, 650 mg at 03/17/24 0326    amLODIPine (Norvasc) tablet 5 mg, 5 mg, oral, BID, Abena Vincent MD    atorvastatin (Lipitor) tablet 80 mg, 80 mg, oral, Nightly, BRENDA Navarro, 80 mg at 03/16/24 2113    brimonidine (AlphaGAN) 0.2 % ophthalmic solution 1 drop, 1 drop, Right Eye, BID, BRENDA Navarro, 1 drop at 03/16/24 2112    carbamide peroxide (Debrox) 6.5 % otic solution 5 drop, 5 drop, Left Ear, BID, BRENDA Landeros, 5 drop at 03/16/24 2113    carbidopa-levodopa (Sinemet)  mg per tablet 1 tablet, 1 tablet, oral, BID, BRENDA Navarro, 1 tablet at 03/16/24 2113    cefTRIAXone (Rocephin) 2 g IV in dextrose 5% 50 mL, 2 g, intravenous, q24h, BRENDA Navarro, Stopped at 03/16/24 1759    cholecalciferol (Vitamin D-3) tablet 4,000 Units, 4,000 Units, oral, Daily, BRENDA Navarro, 4,000 Units at 03/16/24 0911    dextrose 10 % in water (D10W) infusion, 0.3  g/kg/hr, intravenous, Once PRN, BRENDA Moulton    dextrose 50 % injection 25 g, 25 g, intravenous, q15 min PRN, BRENDA Moulton    furosemide (Lasix) tablet 40 mg, 40 mg, oral, BID with meals, Abena Vincent MD    furosemide (Lasix) tablet 40 mg, 40 mg, oral, Once, Abena Vincent MD    gabapentin (Neurontin) capsule 100 mg, 100 mg, oral, BID, BRENDA Navarro, 100 mg at 03/16/24 2113    [Held by provider] glipiZIDE (Glucotrol) tablet 2.5 mg, 2.5 mg, oral, BID AC, BRENDA Navarro    glucagon (Glucagen) injection 1 mg, 1 mg, intramuscular, q15 min PRN, BRENDA Moulton    guaiFENesin (Mucinex) 12 hr tablet 600 mg, 600 mg, oral, BID, BRENDA Navarro, 600 mg at 03/16/24 2113    insulin lispro (HumaLOG) injection 0-5 Units, 0-5 Units, subcutaneous, Before meals & nightly, BRENDA Moulton, 1 Units at 03/14/24 2140    [Held by provider] insulin lispro (HumaLOG) injection 4 Units, 4 Units, subcutaneous, Before meals & nightly, BRENDA Navarro    ipratropium-albuteroL (Duo-Neb) 0.5-2.5 mg/3 mL nebulizer solution 3 mL, 3 mL, nebulization, q2h PRN, BRENDA Navarro, 3 mL at 03/14/24 0738    lidocaine PF (Xylocaine) 10 mg/mL (1 %) injection 50 mg, 5 mL, infiltration, Once, BRENDA Landeros    melatonin tablet 3 mg, 3 mg, oral, Daily PRN, BRENDA Navarro, 3 mg at 03/16/24 2113    metoprolol succinate XL (Toprol-XL) 24 hr tablet 50 mg, 50 mg, oral, Daily, BRENDA Navarro, 50 mg at 03/16/24 0911    oxygen (O2) therapy, , inhalation, Continuous PRN - O2/gases, BRENDA Navarro, 1 L/min at 03/16/24 2100    pantoprazole (ProtoNix) EC tablet 40 mg, 40 mg, oral, BID, BRENDA Moulton, 40 mg at 03/16/24 2113    polyethylene glycol (Glycolax, Miralax) packet 17 g, 17 g, oral, Daily PRN, BRENDA Navarro    sodium bicarbonate tablet 650 mg, 650 mg, oral, BID, Dylon Marin MD, 650  mg at 03/16/24 2113    sodium chloride 0.9% flush 10 mL, 10 mL, intravenous, BID, Candy Curran APRN-CNP, 10 mL at 03/17/24 0832    thiamine (Vitamin B-1) tablet 100 mg, 100 mg, oral, Daily, Candy Curran APRN-CNP, 100 mg at 03/16/24 0911    warfarin (Coumadin) tablet 2.5 mg, 2.5 mg, oral, Daily, Oralia RAMIREZ Avila APRN-CNP, 2.5 mg at 03/16/24 1729    PERTINENT ROS:  GENERAL:  positive for fatigue, poor appetite.  No fever/chills  RESPIRATORY:  positive for shortness of breath.  Negative for cough, wheezing.  CARDIOVASCULAR:   Negative for chest pain or palpitation.  GI:  Negative for abdominal pain, diarrhea, heartburn, nausea, vomiting  : Indwelling Conner catheter in place    Physical Exam:  Vital signs in last 24 hours:  Temp:  [36.3 °C (97.3 °F)-37 °C (98.6 °F)] 37 °C (98.6 °F)  Heart Rate:  [60-84] 84  Resp:  [15-18] 18  BP: (119-168)/(68-88) 163/87    General: Awake, cooperative, not in acute distress, right central catheter in place with no signs of infection at exit site.  HEENT:  NCAT,  mucous membranes moist and pink  NECK:  Elevated JVD, no carotid bruit, supple, no cervical mass or thyromegaly  LUNGS;  Diminished breath sounds, fine Rales  CV:  Distant, regular rate and rhythm, + murmurs  ABDOMEN:  abdomen soft, nontender, ongoing GI bleed  EDEMA:  trace lower extremity edema/+dependent edema  SKIN: Right breast with dressing in place  : Indwelling Conner catheter in place    Intake/Output last 3 shifts:  I/O last 3 completed shifts:  In: 540 (6.3 mL/kg) [P.O.:440; I.V.:50 (0.6 mL/kg); IV Piggyback:50]  Out: 2000 (23.3 mL/kg) [Urine:2000 (0.6 mL/kg/hr)]  Weight: 85.7 kg     DATA:  Diagnotic tests reviewed for Todays visit:  Results from last 7 days   Lab Units 03/17/24  0445   WBC AUTO x10*3/uL 6.0   RBC AUTO x10*6/uL 2.65*   HEMOGLOBIN g/dL 7.9*   HEMATOCRIT % 25.2*     Results from last 7 days   Lab Units 03/17/24  0445 03/14/24  0518 03/13/24  1555   SODIUM mmol/L 140   < >  --    POTASSIUM  mmol/L 3.5   < >  --    CHLORIDE mmol/L 106   < >  --    CO2 mmol/L 29   < >  --    BUN mg/dL 35*   < >  --    CREATININE mg/dL 1.89*   < >  --    CALCIUM mg/dL 7.8*   < >  --    PHOSPHORUS mg/dL 3.3  --   --    MAGNESIUM mg/dL 1.68   < >  --    BILIRUBIN TOTAL mg/dL  --   --  0.3   ALT U/L  --   --  <3*   AST U/L  --   --  11    < > = values in this interval not displayed.           IMAGING: CXR reviewed in  images      SIGNATURE: Abena Vincent MD  Nephrology and Hypertension      This note was partially generated using the Dragon voice recognition system, and there may be some incorrect words, spelling's and punctuation that were not noted in checking the note before saving.

## 2024-03-17 NOTE — PROGRESS NOTES
MRN: [id number]  SERVICE DATE:  3/17/2024   SERVICE TIME:  11:38 AM  Today's Date: 03/17/24  Admission Date: 3/8/2024  Hospital Day: #9    Subjective      Patient seen today resting comfortably.  Having some right upper extremity swelling we will get an ultrasound        Objective      ROS:   General: Denies any change in weight, fever, chills, fatigue.   Head and Neck: Denies any headaches, syncope or history of head injury.   Eyes/Ears: Denies any cataracts, glaucoma, blurred vision, tinnitus.   Nose: Denies any epistaxis or sinus problems   Respiratory: Denies any cough, hemoptysis, wheezing, asthma, dyspnea.   Cardiovascular: No orthopnea, dyspnea, palpitations, congestive heart failure.   Gastrointestinal: Denies any indigestion, nausea, vomiting, diarrhea, constipation.   Neuro: No dizzyness, headache or syncope   ID: No fever or chills.   Endocrine: No weight loss or weight gain.  No thyroid or diabetic complaints     MEDICATIONS:  Current Facility-Administered Medications   Medication Dose Route Frequency Provider Last Rate Last Admin    acetaminophen (Tylenol) tablet 650 mg  650 mg oral q4h PRN BRENDA Navarro   650 mg at 03/17/24 0326    amLODIPine (Norvasc) tablet 5 mg  5 mg oral Daily Abena Vincent MD   5 mg at 03/17/24 0831    atorvastatin (Lipitor) tablet 80 mg  80 mg oral Nightly BRENDA Navarro   80 mg at 03/16/24 2113    brimonidine (AlphaGAN) 0.2 % ophthalmic solution 1 drop  1 drop Right Eye BID BRENDA Navarro   1 drop at 03/17/24 0830    carbamide peroxide (Debrox) 6.5 % otic solution 5 drop  5 drop Left Ear BID BRENDA Landeros   5 drop at 03/17/24 0830    carbidopa-levodopa (Sinemet)  mg per tablet 1 tablet  1 tablet oral BID BRENDA Navarro   1 tablet at 03/17/24 0831    cefTRIAXone (Rocephin) 2 g IV in dextrose 5% 50 mL  2 g intravenous q24h BRENDA Navarro   Stopped at 03/16/24 1759    cholecalciferol (Vitamin D-3) tablet  4,000 Units  4,000 Units oral Daily BRENDA Navarro   4,000 Units at 03/17/24 0831    dextrose 10 % in water (D10W) infusion  0.3 g/kg/hr intravenous Once PRN BRENDA Moulton        dextrose 50 % injection 25 g  25 g intravenous q15 min PRN BRENDA Moulton        furosemide (Lasix) tablet 20 mg  20 mg oral BID with meals Abena Vincent MD   20 mg at 03/17/24 0831    gabapentin (Neurontin) capsule 100 mg  100 mg oral BID BRENDA Navarro   100 mg at 03/17/24 0831    [Held by provider] glipiZIDE (Glucotrol) tablet 2.5 mg  2.5 mg oral BID AC BRENDA Navarro        glucagon (Glucagen) injection 1 mg  1 mg intramuscular q15 min PRN BRENDA Moulton        guaiFENesin (Mucinex) 12 hr tablet 600 mg  600 mg oral BID BRENDA Navarro   600 mg at 03/17/24 0831    insulin lispro (HumaLOG) injection 0-5 Units  0-5 Units subcutaneous Before meals & nightly BRENDA Moulton   1 Units at 03/14/24 2140    [Held by provider] insulin lispro (HumaLOG) injection 4 Units  4 Units subcutaneous Before meals & nightly BRENDA Navarro        ipratropium-albuteroL (Duo-Neb) 0.5-2.5 mg/3 mL nebulizer solution 3 mL  3 mL nebulization q2h PRN BRENDA Navarro   3 mL at 03/14/24 0738    lidocaine PF (Xylocaine) 10 mg/mL (1 %) injection 50 mg  5 mL infiltration Once BRENDA Landeros        melatonin tablet 3 mg  3 mg oral Daily PRN BRENDA Navarro   3 mg at 03/16/24 2113    metoprolol succinate XL (Toprol-XL) 24 hr tablet 50 mg  50 mg oral Daily BRENDA Navarro   50 mg at 03/17/24 0831    oxygen (O2) therapy   inhalation Continuous PRN - O2/gases BRENDA Navarro   1 L/min at 03/16/24 2100    pantoprazole (ProtoNix) EC tablet 40 mg  40 mg oral BID CARLO Moulton-CNP   40 mg at 03/17/24 0831    polyethylene glycol (Glycolax, Miralax) packet 17 g  17 g oral Daily PRN CARLO Navarro-JENNIFER         "sodium bicarbonate tablet 650 mg  650 mg oral BID Dylon Marin MD   650 mg at 03/17/24 0831    sodium chloride 0.9% flush 10 mL  10 mL intravenous BID Candy RODRIGUEZ BRENDA Curran   10 mL at 03/17/24 0832    thiamine (Vitamin B-1) tablet 100 mg  100 mg oral Daily Candy Curran, APRN-CNP   100 mg at 03/17/24 0831    warfarin (Coumadin) tablet 2.5 mg  2.5 mg oral Daily Oralia ASHLEY BRENDA Gramajo   2.5 mg at 03/16/24 1729         PHYSICAL EXAM:   /84   Pulse 80   Temp 36.4 °C (97.5 °F)   Resp 18   Ht 1.88 m (6' 2\")   Wt 85.7 kg (188 lb 15 oz)   SpO2 96%   BMI 24.26 kg/m²      CONSTITUTIONAL - well nourished, well developed, looks like stated age, in no acute distress, not ill-appearing   SKIN - normal skin color and pigmentation, normal skin turgor without rash   HEAD - no trauma, normocephalic  EYES - pupils are equal and reactive to light, extraocular muscles are intact, and normal external exam  ENT - TM's intact, no injection, no signs of infection, uvula midline, normal tongue movement and throat normal  NECK - supple without rigidity, no neck mass was observed, no thyromegaly    CHEST - clear to auscultation, no wheezing, no crackles and no rales, good effort  CARDIAC - regular rate and regular rhythm, no skipped beats, no murmur  ABDOMEN - no organomegaly, soft, nontender, nondistended, normal bowel sounds   NEUROLOGICAL -no significant change right arm immobilized right upper extremity swelling noted       Labs:  Lab Results   Component Value Date    WBC 6.0 03/17/2024    HGB 7.9 (L) 03/17/2024    HCT 25.2 (L) 03/17/2024    MCV 95 03/17/2024     03/17/2024     Lab Results   Component Value Date    GLUCOSE 122 (H) 03/17/2024    CALCIUM 7.8 (L) 03/17/2024     03/17/2024    K 3.5 03/17/2024    CO2 29 03/17/2024     03/17/2024    BUN 35 (H) 03/17/2024    CREATININE 1.89 (H) 03/17/2024   ESR: --No results found for: \"SEDRATE\"  Lab Results   Component Value Date    CRP 6.16 (H) " 03/09/2024     Lab Results   Component Value Date    ALT <3 (L) 03/13/2024    AST 11 03/13/2024    ALKPHOS 50 03/13/2024    BILITOT 0.3 03/13/2024                      Problem List Items Addressed This Visit             ICD-10-CM    * (Principal) Gastrointestinal hemorrhage with melena - Primary K92.1    Relevant Medications    pantoprazole (ProtoNix) 40 mg EC tablet    Extensor tenosynovitis of right wrist M65.831    Relevant Medications    cefTRIAXone (Rocephin) 2 gram/50 mL IV    Other Relevant Orders    Vascular US upper extremity venous duplex right (Completed)    CAD (coronary artery disease) (Chronic) I25.10    Relevant Medications    metoprolol succinate XL (Toprol-XL) 50 mg 24 hr tablet    nitroglycerin (Nitrostat) 0.4 mg SL tablet    metoprolol succinate XL (Toprol-XL) 24 hr tablet 50 mg    amLODIPine (Norvasc) tablet 5 mg    Other Relevant Orders    Transthoracic Echo (TTE) Complete (Completed)    Atrial fibrillation (CMS/HCC) I48.91    Relevant Medications    metoprolol succinate XL (Toprol-XL) 50 mg 24 hr tablet    nitroglycerin (Nitrostat) 0.4 mg SL tablet    metoprolol succinate XL (Toprol-XL) 24 hr tablet 50 mg    amLODIPine (Norvasc) tablet 5 mg    Other Relevant Orders    Transthoracic Echo (TTE) Complete (Completed)    FRANCIE (acute kidney injury) (CMS/HCC) N17.9    Relevant Orders    Transthoracic Echo (TTE) Complete (Completed)    Pleural effusion, bilateral J90    Relevant Orders    Transthoracic Echo (TTE) Complete (Completed)    Swelling of extremity, right M79.89    Relevant Orders    Vascular US upper extremity venous duplex right (Completed)     Other Visit Diagnoses         Codes    Subtherapeutic international normalized ratio (INR)     R79.1    Normocytic normochromic anemia     D64.9    Bilateral pleural effusion     J90    Uremic acidosis     N25.89    Constipation, unspecified constipation type     K59.00    Relevant Medications    polyethylene glycol (Glycolax, Miralax) 17 gram packet         Continue current meds  Ultrasound right upper extremity  Monitor H&H  Other meds continued         Malnutrition Diagnosis Status: New  Malnutrition Diagnosis: Severe malnutrition related to acute disease or injury  As Evidenced by: acute on chronic nutritional stress in the setting of recurrent GI bleed and new dysphagia with indicators including NFPE showing signs of severe muscle wasting/fat loss and intakes meeting <50% of needs for > 5 days.  I agree with the dietitian's malnutrition diagnosis.         Maxim Hickey MD  This note was created using [employer]. Any inadvertent grammar, spelling or syntax errors are do to voice recognition software error and are not intentional.

## 2024-03-18 ENCOUNTER — APPOINTMENT (OUTPATIENT)
Dept: CARDIOLOGY | Facility: HOSPITAL | Age: 79
DRG: 811 | End: 2024-03-18
Payer: MEDICARE

## 2024-03-18 LAB
ANION GAP SERPL CALC-SCNC: 11 MMOL/L (ref 10–20)
BUN SERPL-MCNC: 32 MG/DL (ref 6–23)
CALCIUM SERPL-MCNC: 7.8 MG/DL (ref 8.6–10.3)
CHLORIDE SERPL-SCNC: 104 MMOL/L (ref 98–107)
CO2 SERPL-SCNC: 28 MMOL/L (ref 21–32)
CREAT SERPL-MCNC: 1.91 MG/DL (ref 0.5–1.3)
EGFRCR SERPLBLD CKD-EPI 2021: 35 ML/MIN/1.73M*2
ERYTHROCYTE [DISTWIDTH] IN BLOOD BY AUTOMATED COUNT: 16.1 % (ref 11.5–14.5)
GLUCOSE BLD MANUAL STRIP-MCNC: 119 MG/DL (ref 74–99)
GLUCOSE BLD MANUAL STRIP-MCNC: 122 MG/DL (ref 74–99)
GLUCOSE BLD MANUAL STRIP-MCNC: 127 MG/DL (ref 74–99)
GLUCOSE BLD MANUAL STRIP-MCNC: 134 MG/DL (ref 74–99)
GLUCOSE SERPL-MCNC: 107 MG/DL (ref 74–99)
HCT VFR BLD AUTO: 26.2 % (ref 41–52)
HGB BLD-MCNC: 8.2 G/DL (ref 13.5–17.5)
INR PPP: 1.5 (ref 0.9–1.1)
MAGNESIUM SERPL-MCNC: 1.59 MG/DL (ref 1.6–2.4)
MCH RBC QN AUTO: 29.6 PG (ref 26–34)
MCHC RBC AUTO-ENTMCNC: 31.3 G/DL (ref 32–36)
MCV RBC AUTO: 95 FL (ref 80–100)
NRBC BLD-RTO: 0 /100 WBCS (ref 0–0)
PLATELET # BLD AUTO: 158 X10*3/UL (ref 150–450)
POTASSIUM SERPL-SCNC: 3.3 MMOL/L (ref 3.5–5.3)
PROTHROMBIN TIME: 16.5 SECONDS (ref 9.8–12.8)
RBC # BLD AUTO: 2.77 X10*6/UL (ref 4.5–5.9)
SODIUM SERPL-SCNC: 140 MMOL/L (ref 136–145)
WBC # BLD AUTO: 6.1 X10*3/UL (ref 4.4–11.3)

## 2024-03-18 PROCEDURE — 93971 EXTREMITY STUDY: CPT

## 2024-03-18 PROCEDURE — 92526 ORAL FUNCTION THERAPY: CPT | Mod: GN | Performed by: STUDENT IN AN ORGANIZED HEALTH CARE EDUCATION/TRAINING PROGRAM

## 2024-03-18 PROCEDURE — 2500000001 HC RX 250 WO HCPCS SELF ADMINISTERED DRUGS (ALT 637 FOR MEDICARE OP): Performed by: INTERNAL MEDICINE

## 2024-03-18 PROCEDURE — 2500000004 HC RX 250 GENERAL PHARMACY W/ HCPCS (ALT 636 FOR OP/ED): Performed by: NURSE PRACTITIONER

## 2024-03-18 PROCEDURE — 2500000004 HC RX 250 GENERAL PHARMACY W/ HCPCS (ALT 636 FOR OP/ED): Performed by: INTERNAL MEDICINE

## 2024-03-18 PROCEDURE — 2500000005 HC RX 250 GENERAL PHARMACY W/O HCPCS: Performed by: NURSE PRACTITIONER

## 2024-03-18 PROCEDURE — 2500000001 HC RX 250 WO HCPCS SELF ADMINISTERED DRUGS (ALT 637 FOR MEDICARE OP): Performed by: NURSE PRACTITIONER

## 2024-03-18 PROCEDURE — 2500000002 HC RX 250 W HCPCS SELF ADMINISTERED DRUGS (ALT 637 FOR MEDICARE OP, ALT 636 FOR OP/ED): Performed by: NURSE PRACTITIONER

## 2024-03-18 PROCEDURE — 80048 BASIC METABOLIC PNL TOTAL CA: CPT | Performed by: NURSE PRACTITIONER

## 2024-03-18 PROCEDURE — 85027 COMPLETE CBC AUTOMATED: CPT | Performed by: NURSE PRACTITIONER

## 2024-03-18 PROCEDURE — 85610 PROTHROMBIN TIME: CPT | Performed by: NURSE PRACTITIONER

## 2024-03-18 PROCEDURE — 97530 THERAPEUTIC ACTIVITIES: CPT | Mod: GO,CO

## 2024-03-18 PROCEDURE — 83735 ASSAY OF MAGNESIUM: CPT | Performed by: NURSE PRACTITIONER

## 2024-03-18 PROCEDURE — 1200000002 HC GENERAL ROOM WITH TELEMETRY DAILY

## 2024-03-18 PROCEDURE — 97535 SELF CARE MNGMENT TRAINING: CPT | Mod: GO,CO

## 2024-03-18 PROCEDURE — 97116 GAIT TRAINING THERAPY: CPT | Mod: GP,CQ

## 2024-03-18 PROCEDURE — 82947 ASSAY GLUCOSE BLOOD QUANT: CPT

## 2024-03-18 PROCEDURE — 93971 EXTREMITY STUDY: CPT | Performed by: SURGERY

## 2024-03-18 RX ORDER — FUROSEMIDE 40 MG/1
40 TABLET ORAL DAILY
Status: DISCONTINUED | OUTPATIENT
Start: 2024-03-19 | End: 2024-03-19 | Stop reason: HOSPADM

## 2024-03-18 RX ORDER — POTASSIUM CHLORIDE 20 MEQ/1
40 TABLET, EXTENDED RELEASE ORAL ONCE
Status: DISCONTINUED | OUTPATIENT
Start: 2024-03-18 | End: 2024-03-18

## 2024-03-18 RX ORDER — MAGNESIUM SULFATE HEPTAHYDRATE 40 MG/ML
2 INJECTION, SOLUTION INTRAVENOUS ONCE
Status: COMPLETED | OUTPATIENT
Start: 2024-03-18 | End: 2024-03-18

## 2024-03-18 RX ORDER — POTASSIUM CHLORIDE 14.9 MG/ML
20 INJECTION INTRAVENOUS
Status: COMPLETED | OUTPATIENT
Start: 2024-03-18 | End: 2024-03-18

## 2024-03-18 RX ORDER — LIDOCAINE 560 MG/1
2 PATCH PERCUTANEOUS; TOPICAL; TRANSDERMAL DAILY
Status: DISCONTINUED | OUTPATIENT
Start: 2024-03-18 | End: 2024-03-19 | Stop reason: HOSPADM

## 2024-03-18 RX ORDER — HYDRALAZINE HYDROCHLORIDE 25 MG/1
25 TABLET, FILM COATED ORAL 2 TIMES DAILY
Status: DISCONTINUED | OUTPATIENT
Start: 2024-03-18 | End: 2024-03-19 | Stop reason: HOSPADM

## 2024-03-18 RX ADMIN — CEFTRIAXONE SODIUM 2 G: 2 INJECTION, SOLUTION INTRAVENOUS at 18:06

## 2024-03-18 RX ADMIN — ATORVASTATIN CALCIUM 80 MG: 80 TABLET, FILM COATED ORAL at 22:41

## 2024-03-18 RX ADMIN — POTASSIUM CHLORIDE 20 MEQ: 14.9 INJECTION, SOLUTION INTRAVENOUS at 10:10

## 2024-03-18 RX ADMIN — Medication 5 DROP: at 10:11

## 2024-03-18 RX ADMIN — MAGNESIUM SULFATE HEPTAHYDRATE 2 G: 40 INJECTION, SOLUTION INTRAVENOUS at 10:09

## 2024-03-18 RX ADMIN — Medication 5 DROP: at 22:42

## 2024-03-18 RX ADMIN — GABAPENTIN 100 MG: 100 CAPSULE ORAL at 22:41

## 2024-03-18 RX ADMIN — AMLODIPINE BESYLATE 5 MG: 5 TABLET ORAL at 10:10

## 2024-03-18 RX ADMIN — CARBIDOPA AND LEVODOPA 1 TABLET: 25; 100 TABLET ORAL at 22:41

## 2024-03-18 RX ADMIN — SODIUM BICARBONATE 650 MG: 650 TABLET ORAL at 22:40

## 2024-03-18 RX ADMIN — BRIMONIDINE TARTRATE 1 DROP: 2 SOLUTION OPHTHALMIC at 22:42

## 2024-03-18 RX ADMIN — Medication 3 MG: at 22:41

## 2024-03-18 RX ADMIN — HYDRALAZINE HYDROCHLORIDE 25 MG: 25 TABLET, FILM COATED ORAL at 22:40

## 2024-03-18 RX ADMIN — PANTOPRAZOLE SODIUM 40 MG: 40 TABLET, DELAYED RELEASE ORAL at 22:41

## 2024-03-18 RX ADMIN — ACETAMINOPHEN 650 MG: 325 TABLET ORAL at 22:39

## 2024-03-18 RX ADMIN — LIDOCAINE 4% 2 PATCH: 40 PATCH TOPICAL at 09:00

## 2024-03-18 RX ADMIN — POTASSIUM CHLORIDE 20 MEQ: 14.9 INJECTION, SOLUTION INTRAVENOUS at 11:59

## 2024-03-18 RX ADMIN — WARFARIN SODIUM 2.5 MG: 2.5 TABLET ORAL at 18:06

## 2024-03-18 RX ADMIN — AMLODIPINE BESYLATE 5 MG: 5 TABLET ORAL at 22:39

## 2024-03-18 RX ADMIN — METOPROLOL SUCCINATE 50 MG: 50 TABLET, EXTENDED RELEASE ORAL at 10:10

## 2024-03-18 RX ADMIN — Medication 10 ML: at 10:31

## 2024-03-18 RX ADMIN — FUROSEMIDE 40 MG: 40 TABLET ORAL at 10:11

## 2024-03-18 RX ADMIN — HYDRALAZINE HYDROCHLORIDE 25 MG: 25 TABLET, FILM COATED ORAL at 14:20

## 2024-03-18 RX ADMIN — BRIMONIDINE TARTRATE 1 DROP: 2 SOLUTION OPHTHALMIC at 10:11

## 2024-03-18 RX ADMIN — GABAPENTIN 100 MG: 100 CAPSULE ORAL at 10:10

## 2024-03-18 RX ADMIN — Medication 10 ML: at 23:09

## 2024-03-18 RX ADMIN — GUAIFENESIN 600 MG: 600 TABLET ORAL at 22:39

## 2024-03-18 ASSESSMENT — COGNITIVE AND FUNCTIONAL STATUS - GENERAL
TURNING FROM BACK TO SIDE WHILE IN FLAT BAD: A LOT
DRESSING REGULAR LOWER BODY CLOTHING: TOTAL
TOILETING: TOTAL
WALKING IN HOSPITAL ROOM: A LOT
STANDING UP FROM CHAIR USING ARMS: A LOT
WALKING IN HOSPITAL ROOM: TOTAL
DRESSING REGULAR UPPER BODY CLOTHING: A LOT
MOVING FROM LYING ON BACK TO SITTING ON SIDE OF FLAT BED WITH BEDRAILS: A LOT
DRESSING REGULAR UPPER BODY CLOTHING: A LOT
PERSONAL GROOMING: A LOT
CLIMB 3 TO 5 STEPS WITH RAILING: TOTAL
CLIMB 3 TO 5 STEPS WITH RAILING: A LOT
TOILETING: A LOT
DAILY ACTIVITIY SCORE: 11
MOBILITY SCORE: 10
HELP NEEDED FOR BATHING: A LOT
DRESSING REGULAR LOWER BODY CLOTHING: TOTAL
STANDING UP FROM CHAIR USING ARMS: A LOT
PERSONAL GROOMING: A LOT
DAILY ACTIVITIY SCORE: 10
EATING MEALS: A LOT
EATING MEALS: A LOT
MOVING FROM LYING ON BACK TO SITTING ON SIDE OF FLAT BED WITH BEDRAILS: A LOT
MOVING TO AND FROM BED TO CHAIR: A LOT
TURNING FROM BACK TO SIDE WHILE IN FLAT BAD: A LOT
MOBILITY SCORE: 12
MOVING TO AND FROM BED TO CHAIR: A LOT
HELP NEEDED FOR BATHING: A LOT

## 2024-03-18 ASSESSMENT — PAIN - FUNCTIONAL ASSESSMENT
PAIN_FUNCTIONAL_ASSESSMENT: 0-10

## 2024-03-18 ASSESSMENT — PAIN SCALES - GENERAL
PAINLEVEL_OUTOF10: 3
PAINLEVEL_OUTOF10: 0 - NO PAIN

## 2024-03-18 ASSESSMENT — PAIN DESCRIPTION - LOCATION: LOCATION: GENERALIZED

## 2024-03-18 ASSESSMENT — ACTIVITIES OF DAILY LIVING (ADL): HOME_MANAGEMENT_TIME_ENTRY: 15

## 2024-03-18 NOTE — NURSING NOTE
Patient intermittently confused still and having a hard time swallowing. Patient still refusing for meds to be crushed and to take meds with applesauce or pudding. Patient takes pills 1 at a time and still has difficulty swallowing them. Patient refuses some medications and still refusing to eat food on current diet.     Speech in to see patient and did change diet but still needs thickened liquids.     Patient still refused to eat lunch. IV mag and Kcl finished. New meds ordered per nephrology, patient agreed to take 1 pill.

## 2024-03-18 NOTE — PROGRESS NOTES
Nutrition Follow Up Assessment:   Nutrition Assessment    Reason for Assessment: Dietitian discretion (Auto referral from ST with diet downgrade)    Patient History: Vamsi Benson is a 78 y.o. male presenting to ED on 3/8 with abnormal labs and concern for continued GI bleed.  On admit poor PO intakes were reported after pt was transferred here from [facility].  Pt was recently at [facility] in February due to cellulitis of the right hand as well as coughing up blood on admit.  EGD 3/4/24 showed duodenitis, duodenal ulcer, and esophageal ulcers.  He was discharged to SNF on 3/6. This admit pulmonology is on consult due to bilateral pleural effusions.  Nephrology following due to FRANCIE.  Palliative care on consult for goals of care and pt elected to try SNF again after discharge rather than pursing hospice care at this time.    3/19/24: Follow up note - chart reviewed and events noted - since last review intakes have continued to be poor with nursing noting refusal of most food even when offering assistance with feeding.  He has taken sips of Boost VHC however most bottles are not being consumed >25%.  ST in to reevaluate and recommending soft and bite sized chopped with nectar liquids.  Pt had this for lunch and nursing notes pt refused.  From multiple staff report pt does not seem to understand the diet texture modifications even when explained on multiple occasions from various staff including this RD.  Interviewed pt after seen by ST and pt continues to request regular food items like cheeseburgers and raisin bran.  Speech noting pt continues to be too high risk for aspiration for this liquids.  When asking pt if he would like to be fed with ng if he cannot get enough in by mouth he indicates he would not.     Past Medical History: HTN, HLD, T2DM on insulin, CAD s/p CABG x 5 in 2016, PAF, embolic stroke, Parkinson's, and polyneuropathy     Nutrition History:  Energy Intake: Poor < 50 %  Food  "and Nutrient History: On 3/14 ST saw as nursing was noting pt to have difficulty swallowing liquids.  After eval diet was downgraded to Minced/Moist Level 5 with Mildly Thick ([place]) liquids.  MBSS completed today recommended continuation of altered texture diet.  Pt notes dislike for altered textures but that his appetite was already poor before the diet changes.  Very soft spoken but does answer questions appropriately.  He would like to get high calorie supplements with meals.  Vitamin/Herbal Supplement Use: MVI with minerals  Food Allergies/Intolerances:  None  GI Symptoms:  anorexia  Oral Problems: Chewing difficulty and Swallowing difficulty       Current Diet: Adult diet Carb Controlled; 60 gram carb/meal, 30 gram Carb evening snack; Bite size food 6; Mild thick 2; 1:1 Feeding    Anthropometrics:  Height: 188 cm (6' 2\")   Weight: 86.2 kg (190 lb 0.6 oz)   BMI (Calculated): 24.39             Weight Change %:d    03/08/24 03/10/24 06:00 03/11/24 04:00 03/12/24 06:00 03/13/24 06:00 03/14/24    Weight 91.4 kg (201 lb 8 oz) 90.4 kg (199 lb 4.7 oz) 90.9 kg (200 lb 4.8 oz) 88.6 kg (195 lb 5.2 oz) 88.3 kg (194 lb 11.2 oz) 88.9 kg (195 lb 15.8 oz)   Note: Poor weight history on record prior to 2024      Pain Assessment: 0-10  Pain Score: 0 - No pain  Pain Type: Acute pain  Pain Location: Hand  Pain Orientation: Right  Pain Radiating Towards: when touched      Nutrition Significant Labs:  BMP Trend:   Results from last 7 days   Lab Units 03/18/24  0355 03/17/24  0445 03/16/24  0611 03/15/24  0549   GLUCOSE mg/dL 107* 122* 123* 125*   CALCIUM mg/dL 7.8* 7.8* 7.9* 8.1*   SODIUM mmol/L 140 140 141 141   POTASSIUM mmol/L 3.3* 3.5 3.6 3.9   CO2 mmol/L 28 29 28 26   CHLORIDE mmol/L 104 106 107 108*   BUN mg/dL 32* 35* 36* 39*   CREATININE mg/dL 1.91* 1.89* 1.94* 1.89*        Nutrition Specific Medications:  Scheduled medications  amLODIPine, 5 mg, oral, BID  atorvastatin, 80 mg, oral, Nightly  brimonidine, 1 drop, Right " Eye, BID  carbamide peroxide, 5 drop, Left Ear, BID  carbidopa-levodopa, 1 tablet, oral, BID  cefTRIAXone, 2 g, intravenous, q24h  cholecalciferol, 4,000 Units, oral, Daily  [START ON 3/19/2024] furosemide, 40 mg, oral, Daily  gabapentin, 100 mg, oral, BID  [Held by provider] glipiZIDE, 2.5 mg, oral, BID AC  guaiFENesin, 600 mg, oral, BID  hydrALAZINE, 25 mg, oral, BID  insulin lispro, 0-5 Units, subcutaneous, Before meals & nightly  [Held by provider] insulin lispro, 4 Units, subcutaneous, Before meals & nightly  lidocaine, 2 patch, transdermal, Daily  lidocaine, 5 mL, infiltration, Once  metoprolol succinate XL, 50 mg, oral, Daily  pantoprazole, 40 mg, oral, BID  sodium bicarbonate, 650 mg, oral, BID  sodium chloride 0.9%, 10 mL, intravenous, BID  thiamine, 100 mg, oral, Daily  warfarin, 2.5 mg, oral, Daily      Nutrition Focused Physical Exam Findings:  defer: below information from previous assessment  Subcutaneous Fat Loss:   Orbital Fat Pads: Severe (dark circles, hollowing and loose skin)  Buccal Fat Pads: Severe (hollow, sunken and narrow face)  Triceps: Defer  Ribs: Defer  Muscle Wasting:  Temporalis: Severe (hollowed scooping depression)  Pectoralis (Clavicular Region): Severe (protruding prominent clavicle)  Deltoid/Trapezius: Severe (squared shoulders, acromion process prominent)  Interosseous: Defer  Trapezius/Infraspinatus/Supraspinatus (Scapular Region): Defer  Quadriceps: Defer  Gastrocnemius: Defer  Edema:  Edema: +1 trace  Edema Location: lower extremitites  Physical Findings:  Hair: Negative  Eyes: Negative  Mouth: Negative  Nails: Negative  Skin: Positive (pale in appearance)    I/O:   Last BM Date: 03/17/24; Stool Appearance: Loose (03/16/24 2230)     Estimated Needs:   Total Energy Estimated Needs (kCal):  (2240-2690kcal or 25-30kcal/kg)  Total Protein Estimated Needs (g):  (90-108g or 1.0-1.2g/kg)  Total Fluid Estimated Needs (mL):  (1ml/kcal or per MD recs)          Nutrition Diagnosis    Malnutrition Diagnosis  Patient has Malnutrition Diagnosis: Yes  Diagnosis Status: Ongoing  Malnutrition Diagnosis: Severe malnutrition related to acute disease or injury  As Evidenced by: acute on chronic nutritional stress in the setting of recurrent GI bleed and new dysphagia with indicators including NFPE showing signs of severe muscle wasting/fat loss and intakes meeting <50% of needs for > 5 days.            Nutrition Interventions/Recommendations         Nutrition Prescription:  Individualized Nutrition Prescription Provided for : Continue bite sized food level 6 with mildly thick (NECTAR) liquids, suggest CHO modifier of 75g per meal and 45g evening snack, continue with 1:1 supervision for meals to encourage intakes.        Nutrition Interventions:   Food and/or Nutrient Delivery Interventions  Interventions: Meals and snacks, Medical food supplement  Goal: offer food/drink every 2-3 hours throughout the day  Medical Food Supplement: Commercial beverage, Commercial food  Goal: Recommend Boost VHC (540kcal, 22pro each) BID with breakfast and dinner, recommend Magic Cup (290kcal,9g pro) with lunch meal trays    Coordination of Nutrition Care by a Nutrition Professional  Collaboration and Referral of Nutrition Care: Collaboration by nutrition professional with other providers  Goal: YONATAN Gupta         Nutrition Monitoring and Evaluation   Food/Nutrient Related History Monitoring  Monitoring and Evaluation Plan: Fluid intake, Amount of food  Fluid Intake: Estimated fluid intake  Criteria: Goal to consume >75% of fluids provided  Amount of Food: Estimated amout of food  Criteria: Goal to consume >50% of meals provided    Body Composition/Growth/Weight History  Monitoring and Evaluation Plan: Weight  Weight: Measured weight  Criteria: maintain stable weight    Biochemical Data, Medical Tests and Procedures  Monitoring and Evaluation Plan: Electrolyte/renal panel  Electrolyte and Renal Panel: Sodium, Chloride,  BUN, Potassium  Criteria: maintain WNR            Time Spent/Follow-up Reminder:   Time Spent (min): 60 minutes  Last Date of Nutrition Visit: 03/18/24  Nutrition Follow-Up Needed?: 3-5 days  Follow up Comment: [facility] - check POC due to poor PO intakes, pt declining tf and palliative per previous discussion with nursing

## 2024-03-18 NOTE — PROGRESS NOTES
Physical Therapy    Physical Therapy Treatment    Patient Name: Vamsi Benson  MRN: [id number]  Today's Date: 3/18/2024  Time Calculation  Start Time: 0830  Stop Time: 0900  Time Calculation (min): 30 min       Assessment/Plan   PT Assessment  PT Assessment Results: Decreased strength, Decreased endurance, Impaired balance, Decreased mobility  Rehab Prognosis: Fair  End of Session Communication: Bedside nurse  Assessment Comment: improved overall function.  some encouraging words to get patient to continue participating. improved seated balance though self limiting.  good performance of supine therex.  End of Session Patient Position: Up in chair, Alarm on  PT Plan  Inpatient/Swing Bed or Outpatient: Inpatient  PT Plan  Treatment/Interventions: Bed mobility, Transfer training  PT Plan: Skilled PT  PT Frequency: 3 times per week  PT Discharge Recommendations: Moderate intensity level of continued care  PT Recommended Transfer Status: Assist x2, Assistive device       03/18/24 0830   PT  Visit   PT Received On 03/18/24   Response to Previous Treatment Patient with no complaints from previous session.   General   Prior to Session Communication Bedside nurse   Patient Position Received Bed, 3 rail up;Alarm on   Preferred Learning Style verbal;visual   General Comment patient agreeable to sit edge of bed and try sera-stedy to attempt transfer to recliner.  patient completed sit<>stand with less assist than anticipated though unable to transfer to the chair due to chair being incompatible with sera-stedy.  patient too fatigued at this point to attempt second trial to a new chair.   Precautions   Medical Precautions Fall precautions   Precautions Comment NWB R hand   Pain Assessment   Pain Assessment 0-10   Pain Score   (unable to rate)   Pain Type Acute pain   Pain Location Hand   Pain Orientation Right   Pain Radiating Towards when touched   Cognition   Overall Cognitive Status Impaired   Postural Control   Righting  Reactions fair   Protective Responses fair   Posture Comment mild retro lean, making attempt to correct.  varied assist needed to remain edge of bed,   General Observation   General Observation slightly improved edge of bed sitting   Static Sitting Balance   Static Sitting-Balance Support No upper extremity supported   Static Sitting-Level of Assistance Minimum assistance;Contact guard   Static Sitting-Comment/Number of Minutes 3-5 min   Therapeutic Activity   Therapeutic Activity Performed Yes   Balance/Neuromuscular Re-Education   Balance/Neuromuscular Re-Education Activity Performed Yes   Bed Mobility   Bed Mobility Yes   Bed Mobility 1   Bed Mobility 1 Supine to sitting;Sitting to supine   Level of Assistance 1 Maximum assistance   Bed Mobility Comments 1 x2 assist   Transfers   Transfer Yes   Transfer 1   Transfer From 1 Bed to   Transfer to 1 Stand   Technique 1 Sit to stand;Stand to sit   Transfer Device 1 Indira Stedy;Gait belt   Transfer Level of Assistance 1 Moderate assistance;+2;+1 to manage equipment   Activity Tolerance   Endurance Decreased tolerance for upright activites   PT Assessment   PT Assessment Results Decreased strength;Decreased endurance;Impaired balance;Decreased mobility   Rehab Prognosis Fair   End of Session Communication Bedside nurse   End of Session Patient Position Up in chair;Alarm on   Outpatient Education   Individual(s) Educated Patient   Education Provided Fall Risk;Body Mechanics;POC;Other  (importance of sitting up if possibe)   Risk and Benefits Discussed with Patient/Caregiver/Other yes   Patient/Caregiver Demonstrated Understanding yes   Plan of Care Discussed and Agreed Upon yes   Patient Response to Education Patient/Caregiver Verbalized Understanding of Information   PT Plan   Inpatient/Swing Bed or Outpatient Inpatient   PT Plan   Treatment/Interventions Bed mobility;Transfer training   PT Plan Skilled PT   PT Frequency 3 times per week   PT Discharge Recommendations  Moderate intensity level of continued care   PT Recommended Transfer Status Assist x2;Assistive device     Outcome Measures:  [facility] Basic Mobility  Turning from your back to your side while in a flat bed without using bedrails: A lot  Moving from lying on your back to sitting on the side of a flat bed without using bedrails: A lot  Moving to and from bed to chair (including a wheelchair): A lot  Standing up from a chair using your arms (e.g. wheelchair or bedside chair): A lot  To walk in hospital room: Total  Climbing 3-5 steps with railing: Total  Basic Mobility - Total Score: 10        EDUCATION:  Outpatient Education  Individual(s) Educated: Patient  Education Provided: Fall Risk, Body Mechanics, POC, Other (importance of sitting up if possibe)  Risk and Benefits Discussed with Patient/Caregiver/Other: yes  Patient/Caregiver Demonstrated Understanding: yes  Plan of Care Discussed and Agreed Upon: yes  Patient Response to Education: Patient/Caregiver Verbalized Understanding of Information  GOALS:  Encounter Problems       Encounter Problems (Active)       PT Problem       PT Goal 1 (Progressing)       Start:  03/12/24    Expected End:  03/21/24       Pt able to perform bed mobility with mod assist of 2.           PT Goal 2 (Progressing)       Start:  03/12/24    Expected End:  03/21/24       Pt able to complete all transfers with mod assist of 2.            PT Goal 3 (Progressing)       Start:  03/12/24    Expected End:  03/21/24       Pt able to ambulate 10 feet with LRAD and mod assist of 2.              Pain - Adult

## 2024-03-18 NOTE — PROGRESS NOTES
Occupational Therapy    OT Treatment    Patient Name: Vamsi Benson  MRN: [id number]  Today's Date: 3/18/2024  Time Calculation  Start Time: 0845  Stop Time: 0915  Time Calculation (min): 30 min         Assessment:  OT Assessment: improved from prior session on Friday. Pt fatigues easily  Evaluation/Treatment Tolerance: Patient limited by fatigue  End of Session Communication: Bedside nurse  End of Session Patient Position: Alarm on, Bed, 3 rail up  OT Assessment Results: Decreased ADL status, Decreased upper extremity range of motion, Decreased upper extremity strength, Decreased safe judgment during ADL, Decreased cognition, Decreased endurance, Decreased fine motor control, Decreased functional mobility, Decreased gross motor control, Decreased trunk control for functional activities  Evaluation/Treatment Tolerance: Patient limited by fatigue    Plan:  Treatment Interventions: Functional transfer training, ADL retraining  OT Frequency: 3 times per week  OT Discharge Recommendations: Moderate intensity level of continued care  Treatment Interventions: Functional transfer training, ADL retraining  Subjective     Outcome Measures:[facility] Daily Activity  Putting on and taking off regular lower body clothing: Total  Bathing (including washing, rinsing, drying): A lot  Putting on and taking off regular upper body clothing: A lot  Toileting, which includes using toilet, bedpan or urinal: Total  Taking care of personal grooming such as brushing teeth: A lot  Eating Meals: A lot  Daily Activity - Total Score: 10         03/18/24 0845   OT Last Visit   OT Received On 03/18/24   General   Reason for Referral ADL   Prior to Session Communication Bedside nurse   Patient Position Received Bed, 3 rail up;Alarm on   Preferred Learning Style verbal;visual   General Comment patient agreeable to sit edge of bed and try sera-stedy to attempt transfer to recliner.  patient completed sit<>stand with less assist than anticipated though  unable to transfer to the chair due to chair being incompatible with sera-stedy.  patient too fatigued at this point to attempt second trial to a new chair.   Precautions   Medical Precautions Fall precautions   Precautions Comment NWB R hand   Pain Assessment   Pain Type Acute pain   Pain Location Hand   Pain Orientation Right   Pain Radiating Towards when touched   Cognition   Overall Cognitive Status Impaired   Feeding   Feeding Level of Assistance Moderate assistance   Feeding Where Assessed Bed level   Feeding Comments assist with steadying hand when bringing cup to mouth. Pt hands shake due to parkinsons   Grooming   Grooming Level of Assistance Moderate assistance   Grooming Where Assessed Bed level   Grooming Comments washing face. Hands shaking, requiring assist for thoroughness   LE Dressing   LE Dressing   (dep to don socks)   Toileting   Toileting Level of Assistance Dependent   Where Assessed Bed level   Bed Mobility   Bed Mobility   (max A of 2 people to get in and OOB)   Transfers   Transfer Yes   Transfer 1   Transfer From 1 Bed to   Transfer to 1 Stand   Technique 1 Sit to stand;Stand to sit   Transfer Device 1 Indira Stedy   Transfer Level of Assistance 1 Moderate assistance;+2;+1 to manage equipment   Static Sitting Balance   Static Sitting-Level of Assistance Contact guard   IP OT Assessment   OT Assessment improved from prior session on Friday. Pt fatigues easily   Evaluation/Treatment Tolerance Patient limited by fatigue   End of Session Communication Bedside nurse   End of Session Patient Position Alarm on;Bed, 3 rail up   OT Assessment   OT Assessment Results Decreased ADL status;Decreased upper extremity range of motion;Decreased upper extremity strength;Decreased safe judgment during ADL;Decreased cognition;Decreased endurance;Decreased fine motor control;Decreased functional mobility;Decreased gross motor control;Decreased trunk control for functional activities   Education   Individual(s)  Educated Patient   Education Provided Fall precautons   Patient Response to Education Patient/Caregiver Verbalized Understanding of Information   Education Comment pt would benefit from continued reinforcement   Inpatient Plan   Treatment Interventions Functional transfer training;ADL retraining   OT Frequency 3 times per week   OT Discharge Recommendations Moderate intensity level of continued care         Goals:  Encounter Problems       Encounter Problems (Active)       OT Goals       Max assist bed mobility.       Start:  03/12/24    Expected End:  03/21/24            Max assist sit/stand for ADL.        Start:  03/12/24    Expected End:  03/21/24            Mod assist UB dressing.        Start:  03/12/24    Expected End:  03/21/24            Fair dynamic sitting balance for ADL.        Start:  03/12/24    Expected End:  03/21/24

## 2024-03-18 NOTE — PROGRESS NOTES
Speech-Language Pathology    SLP Adult Inpatient  Speech-Language Pathology Treatment     Patient Name: Vamsi Benson  MRN: [id number]  Today's Date: 3/18/2024  Time Calculation  Start Time: 1108  Stop Time: 1130  Time Calculation (min): 22 min         Current Problem:   1. Gastrointestinal hemorrhage with melena  pantoprazole (ProtoNix) 40 mg EC tablet      2. Subtherapeutic international normalized ratio (INR)        3. Normocytic normochromic anemia        4. Bilateral pleural effusion        5. Extensor tenosynovitis of right wrist  Vascular US upper extremity venous duplex right    Vascular US upper extremity venous duplex right    cefTRIAXone (Rocephin) 2 gram/50 mL IV      6. Swelling of extremity, right  Vascular US upper extremity venous duplex right    Vascular US upper extremity venous duplex right      7. Uremic acidosis        8. Pleural effusion, bilateral  Transthoracic Echo (TTE) Complete    Transthoracic Echo (TTE) Complete      9. FRANCIE (acute kidney injury) (CMS/HCC)  Transthoracic Echo (TTE) Complete    Transthoracic Echo (TTE) Complete      10. Coronary artery disease, unspecified vessel or lesion type, unspecified whether angina present, unspecified whether native or transplanted heart  Transthoracic Echo (TTE) Complete    Transthoracic Echo (TTE) Complete      11. Atrial fibrillation, unspecified type (CMS/HCC)  Transthoracic Echo (TTE) Complete    Transthoracic Echo (TTE) Complete      12. Constipation, unspecified constipation type  polyethylene glycol (Glycolax, Miralax) 17 gram packet      13. Localized edema  Vascular US upper extremity venous duplex right    Vascular US upper extremity venous duplex right      14. Pain in right arm  Vascular US upper extremity venous duplex right      15. Dysphagia, oropharyngeal phase [R13.12]          Assessment:  Patient was seen for dysphagia management following an MBSS completed 3/15/24. The patient has not been eating meals and he dislikes mildly  thick liquids. The patient was agreeable this date to eat a couple pieces of fruit from a fruit cup since he has not been observed with any solid consistencies thus far. The patient demonstrated mildly prolonged mastication and bolus prep but he had full oral clearance and no overt s/s of aspiration. Recommend upgrading solid consistency to soft and bite sized/IDDSI Level 6. Continue with mildly thick (nectar)/IDDSI Level 2. Swallow exercises were introduced as well. See below.     Recommendations:  Therapeutic Swallow Intervention : Compensatory Strategies, PO Trials, Pharyngeal Strengthening Techniques, Caregiver Education  Pharyngeal Strengthening Techniques: Effortful Swallow, Supraglottic Swallow   Solid Diet Recommendations: Soft & bite sized/chopped (IDDSI Level 6)   Liquid Diet Recommendations: Nectar thick/mildly thick (IDDS Level 2)   Compensatory Swallowing Strategies: Upright 90 degrees as possible for all oral intake, Remain upright for 20-30 minutes after meals, Full supervision with meals, One to one assist with meals, Small bites/sips, Eat/feed slowly, Other (Comment) (STRICT oral care)     Medication Administration Recommendations: Whole, With Pureed, Other (Comment) (one at a time)      Baseline Assessment:  Oxygen:   Respiratory Status: Oxygen via nasal cannula (2L O2)   Patient Positioning: Upright in Bed      Plan:  Skilled speech therapy for dysphagia treatment is warranted in order to provide training and instruction regarding the use of compensatory swallow strategies, oropharyngeal strengthening exercises, and pt/caregiver education in order to reduce risk of aspiration, dehydration and malnutrition.  Plan  Inpatient/Swing Bed or Outpatient: Inpatient  SLP TX Plan: Continue Plan of Care  SLP Plan: Skilled SLP  SLP Frequency: 3x per week  Duration: 2 weeks  SLP Discharge Recommendations: Continue skilled SLP services at the next level of care  Equipment Recommended: The Breather  Next  Treatment Priority: RMST  Discussed POC: Patient, Caregiver/family  Discussed Risks/Benefits: Yes  Patient/Caregiver Agreeable: Yes       GOALS: Established 3/14/24:  1. Patient will tolerate baseline diet absent of overt s/s of aspiration in 90% of observed therapeutic trials.  Status: Goalstatus: Ongoing  Progress: See recommendations above     2. Patient will implement safe swallowing strategies to reduce risk of aspiration in 90% of trials given caregiver assistance/cueing as needed.  Status: Goalstatus: Ongoing  Progress: Goal partially met  -Education provided to the patient regarding swallow strategies with emphasis on sitting upright 90 degrees and being diligent and thorough with oral care. Further education/review was provided as to the results of the study and the rationale for the current diet.     3. Patient will complete modified barium swallow study (MBSS) for objective assessment of oropharyngeal swallow function, to assess for aspiration, and to guide further recommendations and treatment plan.  Status: Goal met     4. Patient will complete recommended swallowing exercises (effortful swallow, chin tuck against resistance, supraglottic swallow) for at least 30 repetitions during treatment session given minimal-moderate cues.   Status: Goalstatus: Ongoing  Progress: See objective data below   -Verbal and written instruction with demonstration was provided for Effortful swallows, CTAR and supraglottic swallow. The patient returned demonstration of the ES. He had difficulty with the supraglottic swallow as it was difficult to do all of the steps with one inhalation.     4. Patient will complete respiratory muscle strength training (RMST) at recommended frequency given minimal-moderate cues. NEW GOAL, established 3/15  Status: Goalstatus: Ongoing  Progress: Goal not addressed this date       Long term goal: Maintain adequate nutrition and hydration with the least restrictive oral diet with no overt s/s  "of aspiration or pulmonary compromise.    Subjective:  Patient was agreeable and cooperative for the session.    Pain:  Pain Assessment  Pain Assessment: 0-10  Pain Score: 0 - No pain     Education:  Learner:  Patient  Barriers to Learning: None  Method: Verbal, Written (\"Swallowing Guidelines\" posted at patient's bedside), Demonstration  Education - Topic: ST provided patient education regarding role of ST, purpose of assessment, clinical impressions, goals of treatment, and plan of care. Patient verbalized full comprehension, consistent with cognitive status. Education will be reinforced. ST further coordinated with RN regarding recommendations and precautions per this assessment, with RN verbalizing understanding.  Outcome:  verbalized understanding and agreement                                   "

## 2024-03-18 NOTE — PROGRESS NOTES
INPATIENT NEPHROLOGY CONSULT PROGRESS NOTE      Patient Name: Vamsi Benson MRN: [id number]  DATE of SERVICE: March 18, 2024  TIME of SERVICE: 10:40 AM  CONSULTING SERVICE: Nephrology    REASON for CONSULT: FRANCIE    SUBJECTIVE:  Seen and evaluated at bedside,   Discussed with the nurse and patient apparently has been refusing medications.  Denies any complaints.  Denies any chest pain, shortness of breath, nausea, vomitings.  Nursing staff reported that he is refusing some of his medications.  SUMMARY OF STAY:  Mr. Benson is a 78-year-old male who presented to [facility] March 8, 2024 for evaluation of GI bleed complicated with acute kidney injury.  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  Recent evaluation of right upper extremity swelling was negative for DVT.  History of GI bleed status post EGD March 4, 2024 which demonstrated 3 nonbleeding ulcers in swelling of gastric outlet.  Patient presented to [facility] from [facility] for evaluation of acute blood loss anemia with drop in hemoglobin to 7 mg/dL associated with melena for 2 to 3 days prior to admission in addition to poor oral intake for 4 days.      ASSESSMENT:  1.  FRANCIE secondary to hemodynamically mediated in the setting of acute blood loss anemia with GI bleed and group A streptococcal bacteremia.  2.  Hypervolemia  3.  Hyperkalemia, improving.  4.  Metabolic  acidosis, improving.  5.  Acute blood loss anemia.  6.  UTI.    Plan.  -Renal function has been stable with creatinine around 1.9 mg/dL and BUN is around 32.  -Mild hypokalemia and hypomagnesemia, replaced.  -Hemoglobin stable around 8.2.  -Had a urine output of 850 cc in the last 24 hours and significant for net 500 cc negative and total 7.4 L since admission.  -Will cut down Lasix to 40 mg p.o. daily.  -Currently on ceftriaxone 2 g IV daily.  Metabolic acidosis, continue with sodium bicarbonate 650 mg twice daily.  -Hypertension: Blood pressure has been suboptimally controlled with systolic blood pressure mostly in 140s to 160s.  Currently on amlodipine 5 mg twice daily.  Will avoid ACE inhibitor and diuretics' at this time given renal failure.  Will start on hydralazine 25 mg twice daily.  -Reviewed echocardiogram from 3/10, has a normal ejection fraction and moderate pleural effusion along with moderate mitral valve regurgitation with elevated RVSP.     Will follow, thank you!        Medications:    Current Facility-Administered Medications:     acetaminophen (Tylenol) tablet 650 mg, 650 mg, oral, q4h PRN, BRENDA Navarro, 650 mg at 03/17/24 0326    amLODIPine (Norvasc) tablet 5 mg, 5 mg, oral, BID, Abena Vincent MD, 5 mg at 03/18/24 1010    atorvastatin (Lipitor) tablet 80 mg, 80 mg, oral, Nightly, BRENDA Navarro, 80 mg at 03/17/24 2126    brimonidine (AlphaGAN) 0.2 % ophthalmic solution 1 drop, 1 drop, Right Eye, BID, BRENDA Navarro, 1 drop at 03/18/24 1011    carbamide peroxide (Debrox) 6.5 % otic solution 5 drop, 5 drop, Left Ear, BID, BRENDA Landeros, 5 drop at 03/18/24 1011    carbidopa-levodopa (Sinemet)  mg per tablet 1 tablet, 1 tablet, oral, BID, BRENDA Navarro, 1 tablet at 03/17/24 2125    cefTRIAXone (Rocephin) 2 g IV in dextrose 5% 50 mL, 2 g, intravenous, q24h, BRENDA Navarro, Stopped at 03/17/24 9591    cholecalciferol  (Vitamin D-3) tablet 4,000 Units, 4,000 Units, oral, Daily, BRENDA Navarro, 4,000 Units at 03/16/24 0911    dextrose 10 % in water (D10W) infusion, 0.3 g/kg/hr, intravenous, Once PRN, BRENDA Moulton    dextrose 50 % injection 25 g, 25 g, intravenous, q15 min PRN, BRENDA Moulton    [START ON 3/19/2024] furosemide (Lasix) tablet 40 mg, 40 mg, oral, Daily, Abena Vincent MD    gabapentin (Neurontin) capsule 100 mg, 100 mg, oral, BID, BRENDA Navarro, 100 mg at 03/18/24 1010    [Held by provider] glipiZIDE (Glucotrol) tablet 2.5 mg, 2.5 mg, oral, BID AC, BRENDA Navarro    glucagon (Glucagen) injection 1 mg, 1 mg, intramuscular, q15 min PRN, BRENDA Moulton    guaiFENesin (Mucinex) 12 hr tablet 600 mg, 600 mg, oral, BID, BRENDA Navarro, 600 mg at 03/16/24 2113    insulin lispro (HumaLOG) injection 0-5 Units, 0-5 Units, subcutaneous, Before meals & nightly, BRENDA Moulton, 1 Units at 03/14/24 2140    [Held by provider] insulin lispro (HumaLOG) injection 4 Units, 4 Units, subcutaneous, Before meals & nightly, BRENDA Navarro    ipratropium-albuteroL (Duo-Neb) 0.5-2.5 mg/3 mL nebulizer solution 3 mL, 3 mL, nebulization, q2h PRN, BRENDA Navarro, 3 mL at 03/14/24 0738    lidocaine 4 % patch 2 patch, 2 patch, transdermal, Daily, BRENDA Landeros, 2 patch at 03/18/24 0900    lidocaine PF (Xylocaine) 10 mg/mL (1 %) injection 50 mg, 5 mL, infiltration, Once, Dorina R Hatch, APRN-CNP    melatonin tablet 3 mg, 3 mg, oral, Daily PRN, CARLO Navarro-CNP, 3 mg at 03/16/24 2113    metoprolol succinate XL (Toprol-XL) 24 hr tablet 50 mg, 50 mg, oral, Daily, BRENDA Navarro, 50 mg at 03/18/24 1010    oxygen (O2) therapy, , inhalation, Continuous PRN - O2/gases, BRENDA Navarro, 1 L/min at 03/16/24 2100    pantoprazole (ProtoNix) EC tablet 40 mg, 40 mg, oral, BID, Oralia Gramajo,  APRN-CNP, 40 mg at 03/16/24 2113    polyethylene glycol (Glycolax, Miralax) packet 17 g, 17 g, oral, Daily PRN, Candy Curran APRN-CNP    potassium chloride 20 mEq in 100 mL IV premix, 20 mEq, intravenous, q2h, Dorina Hatch, APRN-CNP, Last Rate: 50 mL/hr at 03/18/24 1159, 20 mEq at 03/18/24 1159    sodium bicarbonate tablet 650 mg, 650 mg, oral, BID, Dylon Marin MD, 650 mg at 03/16/24 2113    sodium chloride 0.9% flush 10 mL, 10 mL, intravenous, BID, Candy Curran APRN-CNP, 10 mL at 03/18/24 1031    thiamine (Vitamin B-1) tablet 100 mg, 100 mg, oral, Daily, Candy Curran APRN-CNP, 100 mg at 03/16/24 0911    warfarin (Coumadin) tablet 2.5 mg, 2.5 mg, oral, Daily, Oralia Gramajo APRN-CNP, 2.5 mg at 03/17/24 1709    PERTINENT ROS:  GENERAL:  positive for fatigue, poor appetite.  No fever/chills  RESPIRATORY:  positive for shortness of breath.  Negative for cough, wheezing.  CARDIOVASCULAR:   Negative for chest pain or palpitation.  GI:  Negative for abdominal pain, diarrhea, heartburn, nausea, vomiting  : Indwelling Conner catheter in place    Physical Exam:  Vital signs in last 24 hours:  Temp:  [35.7 °C (96.3 °F)-36.5 °C (97.7 °F)] 36.4 °C (97.5 °F)  Heart Rate:  [53-83] 75  Resp:  [16-18] 18  BP: (138-170)/(74-95) 170/91    General: Awake, cooperative, not in acute distress, right central catheter in place with no signs of infection at exit site.  HEENT:  NCAT,  mucous membranes moist and pink  NECK:  Elevated JVD, no carotid bruit, supple, no cervical mass or thyromegaly  LUNGS;  Diminished breath sounds, fine Rales  CV:  Distant, regular rate and rhythm, + murmurs  ABDOMEN:  abdomen soft, nontender, ongoing GI bleed  EDEMA:  trace lower extremity edema/+dependent edema  SKIN: Right breast with dressing in place  : Indwelling Conner catheter in place    Intake/Output last 3 shifts:  I/O last 3 completed shifts:  In: 340 (3.9 mL/kg) [P.O.:340]  Out: 1350 (15.7 mL/kg) [Urine:1350 (0.4  mL/kg/hr)]  Weight: 86.2 kg     DATA:  Diagnotic tests reviewed for Todays visit:  Results from last 7 days   Lab Units 03/18/24  0355   WBC AUTO x10*3/uL 6.1   RBC AUTO x10*6/uL 2.77*   HEMOGLOBIN g/dL 8.2*   HEMATOCRIT % 26.2*     Results from last 7 days   Lab Units 03/18/24  0355 03/17/24  0445 03/14/24  0518 03/13/24  1555   SODIUM mmol/L 140 140   < >  --    POTASSIUM mmol/L 3.3* 3.5   < >  --    CHLORIDE mmol/L 104 106   < >  --    CO2 mmol/L 28 29   < >  --    BUN mg/dL 32* 35*   < >  --    CREATININE mg/dL 1.91* 1.89*   < >  --    CALCIUM mg/dL 7.8* 7.8*   < >  --    PHOSPHORUS mg/dL  --  3.3  --   --    MAGNESIUM mg/dL 1.59* 1.68   < >  --    BILIRUBIN TOTAL mg/dL  --   --   --  0.3   ALT U/L  --   --   --  <3*   AST U/L  --   --   --  11    < > = values in this interval not displayed.           IMAGING: CXR reviewed in  images      SIGNATURE: Abena Vincent MD  Nephrology and Hypertension      This note was partially generated using the Dragon voice recognition system, and there may be some incorrect words, spelling's and punctuation that were not noted in checking the note before saving.

## 2024-03-18 NOTE — PROGRESS NOTES
Internal Medicine Progress Note    Patient Name: Vamsi Benson          MRN: [id number]  Today's Date: March 18, 2024          Attending: Rufino Osman MD    Subjective:  Patient was seen and examined at bedside.    Review Of Systems:  CARDIOVASCULAR: Negative for chest pain  RESPIRATORY: Improving shortness of breath  GI: No nausea, vomiting, or diarrhea  Musculoskeletal: Positive for pain in the right hand and right leg    Objective:  Vitals:    03/18/24 0000 03/18/24 0400 03/18/24 0600 03/18/24 0800   BP: 157/82 146/74  (!) 168/95   BP Location: Left arm Left arm     Patient Position: Lying Lying     Pulse: 78 53  60   Resp: 16 16  18   Temp: 36.3 °C (97.3 °F) 35.7 °C (96.3 °F)  36.1 °C (97 °F)   TempSrc: Temporal Temporal     SpO2: 98% 97%  94%   Weight:   86.2 kg (190 lb 0.6 oz)    Height:               Physical Exam:   General appearance: Alert, in no acute distress  Lungs: Diffuse crackles  Heart: RRR without murmur, gallop, or rubs.  Abdomen: Soft, non-tender. Bowel sounds normal  Extremities: wound on the right hand  Neuro: Alert, no focal deficit.    Labs:  Results for orders placed or performed during the hospital encounter of 03/08/24 (from the past 24 hour(s))   POCT GLUCOSE   Result Value Ref Range    POCT Glucose 134 (H) 74 - 99 mg/dL   POCT GLUCOSE   Result Value Ref Range    POCT Glucose 117 (H) 74 - 99 mg/dL   POCT GLUCOSE   Result Value Ref Range    POCT Glucose 119 (H) 74 - 99 mg/dL   Magnesium   Result Value Ref Range    Magnesium 1.59 (L) 1.60 - 2.40 mg/dL   CBC   Result Value Ref Range    WBC 6.1 4.4 - 11.3 x10*3/uL    nRBC 0.0 0.0 - 0.0 /100 WBCs    RBC 2.77 (L) 4.50 - 5.90 x10*6/uL    Hemoglobin 8.2 (L) 13.5 - 17.5 g/dL    Hematocrit 26.2 (L) 41.0 - 52.0 %    MCV 95 80 - 100 fL    MCH 29.6 26.0 - 34.0 pg    MCHC 31.3 (L) 32.0 - 36.0 g/dL    RDW 16.1 (H) 11.5 - 14.5 %    Platelets 158 150 - 450 x10*3/uL   Basic metabolic panel   Result Value Ref Range    Glucose 107 (H) 74 - 99 mg/dL    Sodium  140 136 - 145 mmol/L    Potassium 3.3 (L) 3.5 - 5.3 mmol/L    Chloride 104 98 - 107 mmol/L    Bicarbonate 28 21 - 32 mmol/L    Anion Gap 11 10 - 20 mmol/L    Urea Nitrogen 32 (H) 6 - 23 mg/dL    Creatinine 1.91 (H) 0.50 - 1.30 mg/dL    eGFR 35 (L) >60 mL/min/1.73m*2    Calcium 7.8 (L) 8.6 - 10.3 mg/dL   Protime-INR   Result Value Ref Range    Protime 16.5 (H) 9.8 - 12.8 seconds    INR 1.5 (H) 0.9 - 1.1   Vascular US upper extremity venous duplex right   Result Value Ref Range    BSA 2.12 m2   POCT GLUCOSE   Result Value Ref Range    POCT Glucose 119 (H) 74 - 99 mg/dL       Medications:  Scheduled medications  amLODIPine, 5 mg, oral, BID  atorvastatin, 80 mg, oral, Nightly  brimonidine, 1 drop, Right Eye, BID  carbamide peroxide, 5 drop, Left Ear, BID  carbidopa-levodopa, 1 tablet, oral, BID  cefTRIAXone, 2 g, intravenous, q24h  cholecalciferol, 4,000 Units, oral, Daily  furosemide, 40 mg, oral, BID with meals  gabapentin, 100 mg, oral, BID  [Held by provider] glipiZIDE, 2.5 mg, oral, BID AC  guaiFENesin, 600 mg, oral, BID  insulin lispro, 0-5 Units, subcutaneous, Before meals & nightly  [Held by provider] insulin lispro, 4 Units, subcutaneous, Before meals & nightly  lidocaine, 2 patch, transdermal, Daily  lidocaine, 5 mL, infiltration, Once  magnesium sulfate, 2 g, intravenous, Once  metoprolol succinate XL, 50 mg, oral, Daily  pantoprazole, 40 mg, oral, BID  potassium chloride, 20 mEq, intravenous, q2h  sodium bicarbonate, 650 mg, oral, BID  sodium chloride 0.9%, 10 mL, intravenous, BID  thiamine, 100 mg, oral, Daily  warfarin, 2.5 mg, oral, Daily      Continuous medications     PRN medications  PRN medications: acetaminophen, dextrose 10 % in water (D10W), dextrose, glucagon, ipratropium-albuteroL, melatonin, oxygen, polyethylene glycol      Assessment/Plan:  Principal Problem:    Gastrointestinal hemorrhage with melena  Continue PPI p.o. 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)  Sliding scale insulin      FRANCIE (acute kidney injury) (CMS/[facility])  Serum creatinine 1.91  Continue oral Lasix  Nephrology is following      Metabolic acidosis  Resolved  Continue current medications      Dysphagia  Continue modified diet, I advanced patient to comply with speech therapy      Right hand pain    Right leg pain  Ultrasound of right upper extremity is negative for DVT  Will adjust pain medications    Discussed with patient, RN    Rufino Osman MD   Date: 03/18/24  Time: 10:15 AM    This note was partially created using voice recognition software and is inherently subject to errors including those of syntax and \"sound-alike\" substitutions which may escape proofreading. In such instances, original meaning may be extrapolated by contextual derivation  "

## 2024-03-18 NOTE — PROGRESS NOTES
"INPATIENT PROGRESS NOTES    PATIENT NAME: Vamsi Benson    MRN: [id number]  SERVICE DATE:  3/18/2024   SERVICE TIME:  11:30 AM    SIGNATURE: Aneudy Raymond MD      SUBJECTIVE  Afebrile  No events overnight       OBJECTIVE  PHYSICAL EXAM:   Patient Vitals for the past 24 hrs:   BP Temp Temp src Pulse Resp SpO2 Weight   03/18/24 0800 (!) 168/95 36.1 °C (97 °F) -- 60 18 94 % --   03/18/24 0600 -- -- -- -- -- -- 86.2 kg (190 lb 0.6 oz)   03/18/24 0400 146/74 35.7 °C (96.3 °F) Temporal 53 16 97 % --   03/18/24 0000 157/82 36.3 °C (97.3 °F) Temporal 78 16 98 % --   03/17/24 2000 160/87 36.4 °C (97.5 °F) Temporal 83 16 96 % --   03/17/24 1600 138/85 36.5 °C (97.7 °F) Temporal 73 18 96 % --   03/17/24 1200 163/87 37 °C (98.6 °F) -- 84 18 96 % --         Gen: NAD  Neck: symmetric, no mass  Cardiovascular: RRR  Respiratory: No distress   Extremities: Right hand is wrapped  Skin: Warm and dry.      Labs:  Lab Results   Component Value Date    WBC 6.1 03/18/2024    HGB 8.2 (L) 03/18/2024    HCT 26.2 (L) 03/18/2024    MCV 95 03/18/2024     03/18/2024     Lab Results   Component Value Date    GLUCOSE 107 (H) 03/18/2024    CALCIUM 7.8 (L) 03/18/2024     03/18/2024    K 3.3 (L) 03/18/2024    CO2 28 03/18/2024     03/18/2024    BUN 32 (H) 03/18/2024    CREATININE 1.91 (H) 03/18/2024   ESR: --No results found for: \"SEDRATE\"  Lab Results   Component Value Date    CRP 6.16 (H) 03/09/2024     Lab Results   Component Value Date    ALT <3 (L) 03/13/2024    AST 11 03/13/2024    ALKPHOS 50 03/13/2024    BILITOT 0.3 03/13/2024         DATA:   Diagnostic tests reviewed for today's visit:    Labs this admission reviewed  Imagings this admission reviewed  Cultures: Reviewed        ASSESSMENT :   -Recent right hand abscess with tenosynovitis status post I&D on 2/23 at [facility]  -Recent group A strep bacteremia  -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  Reported receiving 2 doses of IM penicillin and received a third " dose third dose on 3/11.  Repeat RPR in 3 to 6 weeks  -Acute anemia  -Positive urine culture for Candida albicans without symptoms likely colonization  -History of A-fib, renal failure, hypertension     PLAN:  -Clinically stable on Rocephin  -Labs reviewed no thrombocytopenia, slightly worsening creatinine  -No reported diarrhea      Aneudy Raymond MD.   Infectious Diseases Attending

## 2024-03-19 VITALS
DIASTOLIC BLOOD PRESSURE: 81 MMHG | RESPIRATION RATE: 16 BRPM | TEMPERATURE: 98.8 F | HEIGHT: 74 IN | WEIGHT: 190 LBS | BODY MASS INDEX: 24.38 KG/M2 | OXYGEN SATURATION: 98 % | SYSTOLIC BLOOD PRESSURE: 136 MMHG | HEART RATE: 71 BPM

## 2024-03-19 LAB
ANION GAP SERPL CALC-SCNC: 11 MMOL/L (ref 10–20)
BUN SERPL-MCNC: 33 MG/DL (ref 6–23)
CALCIUM SERPL-MCNC: 7.9 MG/DL (ref 8.6–10.3)
CHLORIDE SERPL-SCNC: 105 MMOL/L (ref 98–107)
CO2 SERPL-SCNC: 29 MMOL/L (ref 21–32)
CREAT SERPL-MCNC: 1.9 MG/DL (ref 0.5–1.3)
EGFRCR SERPLBLD CKD-EPI 2021: 35 ML/MIN/1.73M*2
ERYTHROCYTE [DISTWIDTH] IN BLOOD BY AUTOMATED COUNT: 16.3 % (ref 11.5–14.5)
GLUCOSE BLD MANUAL STRIP-MCNC: 108 MG/DL (ref 74–99)
GLUCOSE BLD MANUAL STRIP-MCNC: 124 MG/DL (ref 74–99)
GLUCOSE BLD MANUAL STRIP-MCNC: 139 MG/DL (ref 74–99)
GLUCOSE SERPL-MCNC: 123 MG/DL (ref 74–99)
HCT VFR BLD AUTO: 26.2 % (ref 41–52)
HGB BLD-MCNC: 8.4 G/DL (ref 13.5–17.5)
INR PPP: 1.6 (ref 0.9–1.1)
MAGNESIUM SERPL-MCNC: 2.27 MG/DL (ref 1.6–2.4)
MCH RBC QN AUTO: 29.9 PG (ref 26–34)
MCHC RBC AUTO-ENTMCNC: 32.1 G/DL (ref 32–36)
MCV RBC AUTO: 93 FL (ref 80–100)
NRBC BLD-RTO: 0 /100 WBCS (ref 0–0)
PLATELET # BLD AUTO: 145 X10*3/UL (ref 150–450)
POTASSIUM SERPL-SCNC: 3.7 MMOL/L (ref 3.5–5.3)
PROTHROMBIN TIME: 18.4 SECONDS (ref 9.8–12.8)
RBC # BLD AUTO: 2.81 X10*6/UL (ref 4.5–5.9)
SODIUM SERPL-SCNC: 141 MMOL/L (ref 136–145)
WBC # BLD AUTO: 6.5 X10*3/UL (ref 4.4–11.3)

## 2024-03-19 PROCEDURE — 92526 ORAL FUNCTION THERAPY: CPT | Mod: GN

## 2024-03-19 PROCEDURE — 82947 ASSAY GLUCOSE BLOOD QUANT: CPT

## 2024-03-19 PROCEDURE — 2500000004 HC RX 250 GENERAL PHARMACY W/ HCPCS (ALT 636 FOR OP/ED): Performed by: NURSE PRACTITIONER

## 2024-03-19 PROCEDURE — 2500000001 HC RX 250 WO HCPCS SELF ADMINISTERED DRUGS (ALT 637 FOR MEDICARE OP): Performed by: INTERNAL MEDICINE

## 2024-03-19 PROCEDURE — 83735 ASSAY OF MAGNESIUM: CPT | Performed by: NURSE PRACTITIONER

## 2024-03-19 PROCEDURE — 85610 PROTHROMBIN TIME: CPT | Performed by: NURSE PRACTITIONER

## 2024-03-19 PROCEDURE — 2500000004 HC RX 250 GENERAL PHARMACY W/ HCPCS (ALT 636 FOR OP/ED): Performed by: INTERNAL MEDICINE

## 2024-03-19 PROCEDURE — 2500000001 HC RX 250 WO HCPCS SELF ADMINISTERED DRUGS (ALT 637 FOR MEDICARE OP): Performed by: NURSE PRACTITIONER

## 2024-03-19 PROCEDURE — 85027 COMPLETE CBC AUTOMATED: CPT | Performed by: NURSE PRACTITIONER

## 2024-03-19 PROCEDURE — 2500000005 HC RX 250 GENERAL PHARMACY W/O HCPCS: Performed by: NURSE PRACTITIONER

## 2024-03-19 PROCEDURE — 80048 BASIC METABOLIC PNL TOTAL CA: CPT | Performed by: NURSE PRACTITIONER

## 2024-03-19 RX ORDER — LIDOCAINE 560 MG/1
2 PATCH PERCUTANEOUS; TOPICAL; TRANSDERMAL DAILY
Start: 2024-03-20

## 2024-03-19 RX ORDER — TALC
3 POWDER (GRAM) TOPICAL DAILY PRN
Refills: 0
Start: 2024-03-19

## 2024-03-19 RX ORDER — SODIUM BICARBONATE 325 MG/1
325 TABLET ORAL 2 TIMES DAILY
Qty: 60 TABLET | Refills: 1 | Status: SHIPPED | OUTPATIENT
Start: 2024-03-19 | End: 2024-05-18

## 2024-03-19 RX ORDER — MAGNESIUM SULFATE HEPTAHYDRATE 40 MG/ML
2 INJECTION, SOLUTION INTRAVENOUS ONCE
Status: COMPLETED | OUTPATIENT
Start: 2024-03-19 | End: 2024-03-19

## 2024-03-19 RX ORDER — POTASSIUM CHLORIDE 1.5 G/1.58G
20 POWDER, FOR SOLUTION ORAL DAILY
Qty: 30 PACKET | Refills: 2 | Status: SHIPPED | OUTPATIENT
Start: 2024-03-20 | End: 2024-04-19

## 2024-03-19 RX ORDER — HYDRALAZINE HYDROCHLORIDE 25 MG/1
25 TABLET, FILM COATED ORAL 2 TIMES DAILY
Start: 2024-03-19

## 2024-03-19 RX ORDER — INSULIN LISPRO 100 [IU]/ML
0-5 INJECTION, SOLUTION INTRAVENOUS; SUBCUTANEOUS
Start: 2024-03-19

## 2024-03-19 RX ORDER — SODIUM BICARBONATE 650 MG/1
325 TABLET ORAL 2 TIMES DAILY
Status: DISCONTINUED | OUTPATIENT
Start: 2024-03-19 | End: 2024-03-19 | Stop reason: HOSPADM

## 2024-03-19 RX ORDER — POTASSIUM CHLORIDE 1.5 G/1.58G
20 POWDER, FOR SOLUTION ORAL DAILY
Status: DISCONTINUED | OUTPATIENT
Start: 2024-03-20 | End: 2024-03-19 | Stop reason: HOSPADM

## 2024-03-19 RX ORDER — FUROSEMIDE 40 MG/1
40 TABLET ORAL DAILY
Start: 2024-03-20

## 2024-03-19 RX ORDER — AMLODIPINE BESYLATE 5 MG/1
5 TABLET ORAL 2 TIMES DAILY
Start: 2024-03-19

## 2024-03-19 RX ORDER — SODIUM BICARBONATE 650 MG/1
650 TABLET ORAL 2 TIMES DAILY
Start: 2024-03-19

## 2024-03-19 RX ADMIN — LIDOCAINE 4% 2 PATCH: 40 PATCH TOPICAL at 09:52

## 2024-03-19 RX ADMIN — CARBIDOPA AND LEVODOPA 1 TABLET: 25; 100 TABLET ORAL at 09:51

## 2024-03-19 RX ADMIN — MAGNESIUM SULFATE HEPTAHYDRATE 2 G: 40 INJECTION, SOLUTION INTRAVENOUS at 00:51

## 2024-03-19 RX ADMIN — BRIMONIDINE TARTRATE 1 DROP: 2 SOLUTION OPHTHALMIC at 09:52

## 2024-03-19 RX ADMIN — FUROSEMIDE 40 MG: 40 TABLET ORAL at 09:51

## 2024-03-19 RX ADMIN — AMLODIPINE BESYLATE 5 MG: 5 TABLET ORAL at 09:51

## 2024-03-19 RX ADMIN — Medication 4000 UNITS: at 10:28

## 2024-03-19 RX ADMIN — Medication 5 DROP: at 09:52

## 2024-03-19 RX ADMIN — Medication 10 ML: at 10:59

## 2024-03-19 RX ADMIN — HYDRALAZINE HYDROCHLORIDE 25 MG: 25 TABLET, FILM COATED ORAL at 09:50

## 2024-03-19 RX ADMIN — METOPROLOL SUCCINATE 50 MG: 50 TABLET, EXTENDED RELEASE ORAL at 09:51

## 2024-03-19 ASSESSMENT — COGNITIVE AND FUNCTIONAL STATUS - GENERAL
MOVING TO AND FROM BED TO CHAIR: A LOT
HELP NEEDED FOR BATHING: A LOT
DAILY ACTIVITIY SCORE: 13
TOILETING: A LOT
EATING MEALS: A LITTLE
WALKING IN HOSPITAL ROOM: A LOT
PERSONAL GROOMING: A LOT
MOVING FROM LYING ON BACK TO SITTING ON SIDE OF FLAT BED WITH BEDRAILS: A LOT
TURNING FROM BACK TO SIDE WHILE IN FLAT BAD: A LOT
CLIMB 3 TO 5 STEPS WITH RAILING: TOTAL
STANDING UP FROM CHAIR USING ARMS: A LOT
MOBILITY SCORE: 11
DRESSING REGULAR UPPER BODY CLOTHING: A LOT
DRESSING REGULAR LOWER BODY CLOTHING: A LOT

## 2024-03-19 ASSESSMENT — PAIN - FUNCTIONAL ASSESSMENT: PAIN_FUNCTIONAL_ASSESSMENT: 0-10

## 2024-03-19 ASSESSMENT — PAIN SCALES - GENERAL
PAINLEVEL_OUTOF10: 0 - NO PAIN
PAINLEVEL_OUTOF10: 0 - NO PAIN

## 2024-03-19 NOTE — NURSING NOTE
1230    Patient had 5 beats of vtach. VS stable. Patient resting in bed. Provider contacted and states plan is to order mg. No c/o pain at this time    0500    Patient alert and oriented with some confusion. Conner in place and draining to gravity. Conner has some occasional blood tinges and was changed on prior shift. Patient encouraged to turn and reposition. Right hand dressing changed this shift. No acute changes this shift. Patient currently in bed watching tv.

## 2024-03-19 NOTE — CARE PLAN
The patient's goals for the shift include      The clinical goals for the shift include see POC      Problem: Psychosocial Needs  Goal: Collaborate with me, my family, and caregiver to identify my specific goals  Outcome: Progressing     Problem: Discharge Barriers  Goal: My discharge needs are met  Outcome: Progressing     Problem: Skin  Goal: Promote/optimize nutrition  Outcome: Progressing  Goal: Decreased wound size/increased tissue granulation at next dressing change  Outcome: Progressing  Goal: Prevent/minimize sheer/friction injuries  Outcome: Progressing  Goal: Promote skin healing  Outcome: Progressing     Problem: Respiratory  Goal: Clear secretions with interventions this shift  Outcome: Progressing  Goal: Minimal/no exertional discomfort or dyspnea this shift  Outcome: Progressing  Goal: No signs of respiratory distress (eg. Use of accessory muscles. Peds grunting)  Outcome: Progressing     Problem: Pain  Goal: Takes deep breaths with improved pain control throughout the shift  Outcome: Met  Goal: Turns in bed with improved pain control throughout the shift  Outcome: Met  Goal: Walks with improved pain control throughout the shift  Outcome: Met  Goal: Performs ADL's with improved pain control throughout shift  Outcome: Met  Goal: Participates in PT with improved pain control throughout the shift  Outcome: Met  Goal: Free from opioid side effects throughout the shift  Outcome: Met  Goal: Free from acute confusion related to pain meds throughout the shift  Outcome: Met     Problem: Diabetes  Goal: Achieve decreasing blood glucose levels by end of shift  Outcome: Met  Goal: Increase stability of blood glucose readings by end of shift  Outcome: Met  Goal: Decrease in ketones present in urine by end of shift  Outcome: Met  Goal: Maintain electrolyte levels within acceptable range throughout shift  Outcome: Met  Goal: Maintain glucose levels >70mg/dl to <250mg/dl throughout shift  Outcome: Met  Goal: No  changes in neurological exam by end of shift  Outcome: Met  Goal: Learn about and adhere to nutrition recommendations by end of shift  Outcome: Met  Goal: Vital signs within normal range for age by end of shift  Outcome: Met  Goal: Increase self care and/or family involovement by end of shift  Outcome: Met  Goal: Receive DSME education by end of shift  Outcome: Met     Problem: Pain - Adult  Goal: Verbalizes/displays adequate comfort level or baseline comfort level  Outcome: Progressing     Problem: Safety - Adult  Goal: Free from fall injury  Outcome: Progressing     Problem: Discharge Planning  Goal: Discharge to home or other facility with appropriate resources  Outcome: Progressing     Problem: Chronic Conditions and Co-morbidities  Goal: Patient's chronic conditions and co-morbidity symptoms are monitored and maintained or improved  Outcome: Progressing

## 2024-03-19 NOTE — PROGRESS NOTES
Speech-Language Pathology    SLP Adult Inpatient  Speech-Language Pathology Treatment     Patient Name: Vamsi Benson  MRN: [id number]  Today's Date: 3/19/2024  Time Calculation  Start Time: 0850  Stop Time: 0928  Time Calculation (min): 38 min        Recommendations:  DIET:   -Soft and bite sized solids/IDDSI Level 6  -Mildly thick (nectar) liquids/IDDSI Level 2   -If cleared by the MD, patient may have small single ice chips intermittently throughout the day (not with meals) for pleasure/comfort/therapeutic purposes following oral care, via RN supervision/administration, and with strict aspiration precautions. Please do not exceed more than 5 ice chips per hour. Please discontinue if overt clinical s/s of aspiration occur and/or his pulmonary status declines.   STRATEGIES:   -Sit upright 90 degrees for all PO intake  -Remain upright 20-30 minutes after meals  -Feed / Eat at a slow rate  -Double swallows  -Small Bite/Sip  -Moistening agents  -Medications whole in a puree carrier  -Diligent and routine oral care   Please notify the SLP team with any concerns or issues regarding patient' PO intake/aspiration.      SLP Assessment:  SLP TX Intervention Outcome: Making Progress Towards Goals  Prognosis: Good  Treatment Tolerance: Patient tolerated treatment well  Medical Staff Made Aware: Yes  Strengths: Cognition, Motivation, Family/Caregiver Suppport  Barriers: Comorbidities  Education Provided: Yes      Plan:  Inpatient/Swing Bed or Outpatient: Inpatient  SLP TX Plan: Continue Plan of Care  SLP Plan: Skilled SLP  SLP Frequency: 3x per week  Duration: 2 weeks  SLP Discharge Recommendations: Continue skilled SLP services at the next level of care  Next Treatment Priority: Will address/initiate RMST if equipment is available  Discussed POC: Patient, Nursing  Discussed Risks/Benefits: Yes  Patient/Caregiver Agreeable: Yes      Current Problem:   Principal Problem: Gastrointestinal hemorrhage with melena  -Continue PPI  p.o. twice daily  -Hemoglobin 8.2  -GI is following  -Start patient on Coumadin  -Monitor INR  Please see MD reports for additional/detailed information.      Subjective   Patient denied swallow dysfunction at this time. Nursing reported no issues with patient's recent PO intake.   Patient declined his breakfast meal; however, was willing to participate in therapy and eduction.   Patient endorsed feeling weak.       Most Recent Visit:  SLP Received On: 03/18/24      General Visit Information:  Patient Seen During This Visit: Yes  Prior to Session Communication: Bedside nurse      Pain Assessment:  Pain Assessment: 0-10  Pain Score: 0 - No pain      Therapeutic Swallow/Objective/Clinical Observations:  Therapeutic Swallow Intervention : PO Trials, Caregiver Education, Pharyngeal Strengthening Techniques  Pharyngeal Strengthening Techniques: Effortful Swallow  Solid Diet Recommendations: Soft & bite sized/chopped (IDDSI Level 6)  Liquid Diet Recommendations: Thin (IDDSI Level 0)  Xerostomia visualized; oral moisturizer provided/applied.   Patient consumed one sip of the mildly thick liquids via straw and ~5 therapeutic ice chips this date; declined further PO intake.   Oral phase appeared functional with the above consistencies. No overt clinical s/s of aspiration noted. Swallow onset appeared to be timely and laryngeal elevation was visually appreciated.   There is no updated chest imaging to report on. Patient is stable and on room air at this time.       GOALS: Established 3/14/24:  1. Patient will tolerate baseline diet absent of overt s/s of aspiration in 90% of observed therapeutic trials.  Status: Ongoing  Progress: Progressing, see above     2. Patient will implement safe swallowing strategies to reduce risk of aspiration in 90% of trials given caregiver assistance/cueing as needed.  Status: Ongoing  Progress: Goal partially met  Ongoing education provided to the patient regarding swallow strategies/aspiration  precautions with emphasis on sitting upright 90 degrees, small sips, and being diligent/thorough with oral care. Further education/review was provided on the results of the study and the rationale for the current recommendations.      3. Patient will complete modified barium swallow study (MBSS) for objective assessment of oropharyngeal swallow function, to assess for aspiration, and to guide further recommendations and treatment plan.  Status: Goal met     4. Patient will complete recommended swallowing exercises (effortful swallow, chin tuck against resistance, supraglottic swallow) for at least 30 repetitions during treatment session given minimal-moderate cues.   Status: Ongoing  Progress: Patient was re-educated on the effortful swallow exercise to target his pharyngeal phase deficits (tongue base retraction, laryngeal elevation, laryngeal vestibular closure, pharyngeal stripping wave).  Patient was able to complete the exercise without difficulty following education on its rationale, instruction, and demonstration. He was instructed to complete the exercise 50-100x/daily with rest breaks in between. His questions were answered to satisfaction at this time.      4. Patient will complete respiratory muscle strength training (RMST) at recommended frequency given minimal-moderate cues. NEW GOAL, established 3/15  Status: Ongoing  Progress: Goal not addressed this date; will address next session if equipment is available      Long term goal: Maintain adequate oral nutrition and hydration with least restrictive diet/liquid consistencies without overt s/s of aspiration and/or adverse outcomes associated with aspiration/dysphagia       Inpatient Education:  Individual(s) Educated: Patient, Caregiver  Verbal Education : MBSS results/recomendations, aspiration precautions, exercises, ST POC  Risk and Benefits Discussed with Patient/Caregiver/Other: yes  Patient/Caregiver Demonstrated Understanding: yes  Plan of Care  Discussed and Agreed Upon: yes  Patient Response to Education: Patient/Caregiver Verbalized Understanding of Information, Patient/Caregiver Performed Return Demonstration of Exercises/Activities      Thank you!

## 2024-03-19 NOTE — PROGRESS NOTES
03/19/24 1216   Discharge Planning   Home or Post Acute Services Post acute facilities (Rehab/SNF/etc)   Type of Post Acute Facility Services Skilled nursing   [facility] expects to be discharged [facility]: [facility]   What day is the transport expected? 03/19/24   What time is the transport expected? 1515     Notified pt's son of d/c and  time of 3:15pm

## 2024-03-19 NOTE — NURSING NOTE
EOS: Patient remained A&O x3-4 noting confusion of situation at times. Patient was repositioned every two hours and ramirez care/madhu care completed. Patient refused some of his AM PO medications d/t refusing to allow this RN to administer in purred. Patient stated he only wants to take it with water. MD notified and education provided.   Patient safety maintained with bed alarm on and audible with call bell within reach.   1430- Report called to [facility]. Scheduled  time at 1515.   1510- Transport here to take patient to SNF. Tele removed. Personal belongings with patient. Vitals taken and stable.

## 2024-03-19 NOTE — PROGRESS NOTES
03/19/24 1131   Discharge Planning   Home or Post Acute Services Post acute facilities (Rehab/SNF/etc)   Type of Post Acute Facility Services Skilled nursing   Patient expects to be discharged to: [facility]     Patient ready to d/c per medical team. Request sent to [facility] to complete 7000, send gold form and AVS. Message to [facility] to ask if they can accept today- awaiting reply. SW to arrange transport and notify family.     1200- [facility] transport confirmed for 3:15pm. Notified nursing and facility. SW to notify pt's son. No need for 7000 per [facility] as patient is returning to their SNF. Updated [facility] to only send gold form and AVS.

## 2024-03-19 NOTE — CARE PLAN
The patient's goals for the shift include      The clinical goals for the shift include see POC    Problem: Psychosocial Needs  Goal: Collaborate with me, my family, and caregiver to identify my specific goals  Outcome: Progressing     Problem: Discharge Barriers  Goal: My discharge needs are met  Outcome: Progressing     Problem: Skin  Goal: Promote/optimize nutrition  Outcome: Progressing  Flowsheets (Taken 3/18/2024 1143 by Klaudia Villa, RN)  Promote/optimize nutrition:   Assist with feeding   Monitor/record intake including meals  Goal: Decreased wound size/increased tissue granulation at next dressing change  Outcome: Progressing  Flowsheets (Taken 3/18/2024 1143 by Klaudia Villa, RN)  Decreased wound size/increased tissue granulation at next dressing change:   Promote sleep for wound healing   Protective dressings over bony prominences  Goal: Prevent/minimize sheer/friction injuries  Outcome: Progressing  Flowsheets (Taken 3/19/2024 1303)  Prevent/minimize sheer/friction injuries: Use pull sheet  Goal: Promote skin healing  Outcome: Progressing  Flowsheets (Taken 3/19/2024 1303)  Promote skin healing: Protective dressings over bony prominences     Problem: Respiratory  Goal: Clear secretions with interventions this shift  Outcome: Progressing  Goal: Minimal/no exertional discomfort or dyspnea this shift  Outcome: Progressing  Goal: No signs of respiratory distress (eg. Use of accessory muscles. Peds grunting)  Outcome: Progressing     Problem: Fall/Injury  Goal: Verbalize understanding of personal risk factors for fall in the hospital  Outcome: Progressing  Goal: Verbalize understanding of risk factor reduction measures to prevent injury from fall in the home  Outcome: Progressing  Goal: Use assistive devices by end of the shift  Outcome: Progressing     Problem: Pain - Adult  Goal: Verbalizes/displays adequate comfort level or baseline comfort level  Outcome: Progressing     Problem: Safety -  Adult  Goal: Free from fall injury  Outcome: Progressing     Problem: Discharge Planning  Goal: Discharge to home or other facility with appropriate resources  Outcome: Progressing     Problem: Chronic Conditions and Co-morbidities  Goal: Patient's chronic conditions and co-morbidity symptoms are monitored and maintained or improved  Outcome: Progressing

## 2024-03-19 NOTE — PROGRESS NOTES
INPATIENT NEPHROLOGY CONSULT PROGRESS NOTE      Patient Name: Vamsi Benson MRN: [id number]  DATE of SERVICE: March 19, 2024  TIME of SERVICE: 10:40 AM  CONSULTING SERVICE: Nephrology    REASON for CONSULT: FRANCIE    SUBJECTIVE:  Seen and evaluated at bedside,    Denies any complaints.  Denies any chest pain, shortness of breath, nausea, vomiting, abdominal pain, headache, dizziness.  SUMMARY OF STAY:  Mr. Benson is a 78-year-old male who presented to [facility] March 8, 2024 for evaluation of GI bleed complicated with acute kidney injury.  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  Recent evaluation of right upper extremity swelling was negative for DVT.  History of GI bleed status post EGD March 4, 2024 which demonstrated 3 nonbleeding ulcers in swelling of gastric outlet.  Patient presented to [facility] from [facility] for evaluation of acute blood loss anemia with drop in hemoglobin to 7 mg/dL associated with melena for 2 to 3 days prior to admission in addition to poor oral intake for 4 days.      ASSESSMENT:  1.  FRANCIE secondary to hemodynamically mediated in the setting of acute blood loss anemia with GI bleed and group A streptococcal bacteremia.  2.  Hypervolemia  3.  Hyperkalemia, improving.  4.  Metabolic acidosis, improving.  5.  Acute blood loss anemia.  6.  UTI.    Plan.  - Renal function remained stable with  creatinine around 1.9 mg/dL and BUN is around 33.  -Remains nonoliguric with urine output of 1 L in the last 24 hours and  No significant for net 775 cc negative.  Weight is down by 10 pounds since admission.  -Currently on Lasix 40 mg p.o. daily and will add KCl 20 mg p.o. daily.  -Hypertension: Blood pressure has been optimally controlled with systolic blood pressure mostly in the 120s to 140s.  -Currently on hydralazine 25 mg twice daily and amlodipine 5 mg p.o. twice daily.  -Metabolic acidosis, on sodium bicarbonate 650 mg twice daily and bicarb is up to 29, will cut down sodium bicarbonate to 325 mg twice daily  -Hold ACE inhibitors and diuretics for now.  -Reviewed echocardiogram from 3/10, has a normal ejection fraction and moderate pleural effusion along with moderate mitral valve regurgitation with elevated RVSP.   -Okay to discharge from nephrology standpoint, needs a BMP in 1 week and a follow-up with nephrology in 2 weeks.  Please fax lab results to my office at [phone].  Discussed with NP.    Will follow, thank you!        Medications:    Current Facility-Administered Medications:     acetaminophen (Tylenol) tablet 650 mg, 650 mg, oral, q4h PRN, BRENDA Navarro, 650 mg at 03/18/24 2239    amLODIPine (Norvasc) tablet 5 mg, 5 mg, oral, BID, Abena Vincent MD, 5 mg at 03/19/24 0951    atorvastatin (Lipitor) tablet 80 mg, 80 mg, oral, Nightly, BRENDA Navarro, 80 mg at 03/18/24 2241    brimonidine (AlphaGAN) 0.2 % ophthalmic solution 1 drop, 1 drop, Right Eye, BID, BRENDA Navarro, 1 drop at 03/19/24 0952    carbamide peroxide (Debrox) 6.5 % otic solution 5 drop, 5 drop, Left Ear, BID, BRENDA Landeros, 5 drop at 03/19/24 0952    carbidopa-levodopa (Sinemet)  mg per tablet 1 tablet, 1 tablet, oral, BID, BRENDA Navarro, 1 tablet at 03/19/24 0951    cefTRIAXone (Rocephin) 2 g IV in dextrose 5% 50 mL, 2 g, intravenous, q24h, Candy Curran,  APRN-CNP, Stopped at 03/18/24 1836    cholecalciferol (Vitamin D-3) tablet 4,000 Units, 4,000 Units, oral, Daily, BRENDA Navarro, 4,000 Units at 03/19/24 1028    dextrose 10 % in water (D10W) infusion, 0.3 g/kg/hr, intravenous, Once PRN, BRENDA Moulton    dextrose 50 % injection 25 g, 25 g, intravenous, q15 min PRN, BRENDA Moulton    furosemide (Lasix) tablet 40 mg, 40 mg, oral, Daily, Abena Vincent MD, 40 mg at 03/19/24 0951    gabapentin (Neurontin) capsule 100 mg, 100 mg, oral, BID, BRENDA Navarro, 100 mg at 03/18/24 2241    [Held by provider] glipiZIDE (Glucotrol) tablet 2.5 mg, 2.5 mg, oral, BID AC, BRENDA Navarro    glucagon (Glucagen) injection 1 mg, 1 mg, intramuscular, q15 min PRN, BRENDA Moulton    guaiFENesin (Mucinex) 12 hr tablet 600 mg, 600 mg, oral, BID, BRENDA Navarro, 600 mg at 03/18/24 2239    hydrALAZINE (Apresoline) tablet 25 mg, 25 mg, oral, BID, Abena Vincent MD, 25 mg at 03/19/24 0950    insulin lispro (HumaLOG) injection 0-5 Units, 0-5 Units, subcutaneous, Before meals & nightly, BRENDA Moulton, 1 Units at 03/14/24 2140    [Held by provider] insulin lispro (HumaLOG) injection 4 Units, 4 Units, subcutaneous, Before meals & nightly, BRENDA Navarro    ipratropium-albuteroL (Duo-Neb) 0.5-2.5 mg/3 mL nebulizer solution 3 mL, 3 mL, nebulization, q2h PRN, BRENDA Navarro, 3 mL at 03/14/24 0738    lidocaine 4 % patch 2 patch, 2 patch, transdermal, Daily, BRENDA Landeros, 2 patch at 03/19/24 0952    lidocaine PF (Xylocaine) 10 mg/mL (1 %) injection 50 mg, 5 mL, infiltration, Once, BRENDA Landeros    melatonin tablet 3 mg, 3 mg, oral, Daily PRN, BRENDA Navarro, 3 mg at 03/18/24 2241    metoprolol succinate XL (Toprol-XL) 24 hr tablet 50 mg, 50 mg, oral, Daily, BRENDA Navarro, 50 mg at 03/19/24 0951    oxygen (O2) therapy, , inhalation,  Continuous PRN - O2/gases, BRENDA Navarro, 1 L/min at 03/16/24 2100    pantoprazole (ProtoNix) EC tablet 40 mg, 40 mg, oral, BID, BRENDA Moulton, 40 mg at 03/18/24 2241    polyethylene glycol (Glycolax, Miralax) packet 17 g, 17 g, oral, Daily PRN, BRENDA Navarro    sodium bicarbonate tablet 650 mg, 650 mg, oral, BID, Dylon Marin MD, 650 mg at 03/18/24 2240    sodium chloride 0.9% flush 10 mL, 10 mL, intravenous, BID, BRENDA Navarro, 10 mL at 03/19/24 1059    thiamine (Vitamin B-1) tablet 100 mg, 100 mg, oral, Daily, BRENDA Navarro, 100 mg at 03/16/24 0911    warfarin (Coumadin) tablet 2.5 mg, 2.5 mg, oral, Daily, BRENDA Moulton, 2.5 mg at 03/18/24 1806    PERTINENT ROS:  GENERAL:  positive for fatigue, poor appetite.  No fever/chills  RESPIRATORY:  positive for shortness of breath.  Negative for cough, wheezing.  CARDIOVASCULAR:   Negative for chest pain or palpitation.  GI:  Negative for abdominal pain, diarrhea, heartburn, nausea, vomiting  : Indwelling Conner catheter in place    Physical Exam:  Vital signs in last 24 hours:  Temp:  [36 °C (96.8 °F)-37.3 °C (99.1 °F)] 36.3 °C (97.3 °F)  Heart Rate:  [63-89] 63  Resp:  [16-20] 18  BP: (105-170)/(61-83) 117/61    General: Awake, cooperative, not in acute distress, right central catheter in place with no signs of infection at exit site.  HEENT:  NCAT,  mucous membranes moist and pink  NECK:  Elevated JVD, no carotid bruit, supple, no cervical mass or thyromegaly  LUNGS;  Diminished breath sounds, fine Rales  CV:  Distant, regular rate and rhythm, + murmurs  ABDOMEN:  abdomen soft, nontender, ongoing GI bleed  EDEMA:  - lower extremity edema/dependent edema  SKIN: Right breast with dressing in place    Intake/Output last 3 shifts:  I/O last 3 completed shifts:  In: 300 (3.5 mL/kg) [I.V.:50 (0.6 mL/kg); IV Piggyback:250]  Out: 1925 (22.3 mL/kg) [Urine:1925 (0.6 mL/kg/hr)]  Weight: 86.2 kg      DATA:  Diagnotic tests reviewed for Todays visit:  Results from last 7 days   Lab Units 03/19/24  0602   WBC AUTO x10*3/uL 6.5   RBC AUTO x10*6/uL 2.81*   HEMOGLOBIN g/dL 8.4*   HEMATOCRIT % 26.2*     Results from last 7 days   Lab Units 03/19/24  0602 03/18/24  0355 03/17/24  0445 03/14/24  0518 03/13/24  1555   SODIUM mmol/L 141   < > 140   < >  --    POTASSIUM mmol/L 3.7   < > 3.5   < >  --    CHLORIDE mmol/L 105   < > 106   < >  --    CO2 mmol/L 29   < > 29   < >  --    BUN mg/dL 33*   < > 35*   < >  --    CREATININE mg/dL 1.90*   < > 1.89*   < >  --    CALCIUM mg/dL 7.9*   < > 7.8*   < >  --    PHOSPHORUS mg/dL  --   --  3.3  --   --    MAGNESIUM mg/dL 2.27   < > 1.68   < >  --    BILIRUBIN TOTAL mg/dL  --   --   --   --  0.3   ALT U/L  --   --   --   --  <3*   AST U/L  --   --   --   --  11    < > = values in this interval not displayed.           IMAGING: CXR reviewed in  images      SIGNATURE: Abena Vincent MD  Nephrology and Hypertension      This note was partially generated using the Dragon voice recognition system, and there may be some incorrect words, spelling's and punctuation that were not noted in checking the note before saving.

## 2024-03-20 NOTE — DISCHARGE SUMMARY
Discharge Diagnosis  Gastrointestinal hemorrhage  Hyperlipidemia  Hypertension  Extensor tenosynovitis in the right wrist  Atrial fibrillation  Insulin-dependent type 2 diabetes mellitus  Acute kidney injury  Metabolic acidosis  Dysphagia    Discharge Meds     Your medication list        START taking these medications        Instructions Last Dose Given Next Dose Due   amLODIPine 5 mg tablet  Commonly known as: Norvasc      Take 1 tablet (5 mg) by mouth 2 times a day.       cefTRIAXone 2 gram/50 mL IV  Commonly known as: Rocephin      Infuse 50 mL (2 g) at 100 mL/hr over 30 minutes into a venous catheter once every 24 hours for 11 days.       furosemide 40 mg tablet  Commonly known as: Lasix  Start taking on: March 20, 2024      Take 1 tablet (40 mg) by mouth once daily. Do not start before March 20, 2024.       hydrALAZINE 25 mg tablet  Commonly known as: Apresoline      Take 1 tablet (25 mg) by mouth 2 times a day.       lidocaine 4 % patch  Start taking on: March 20, 2024      Place 2 patches over 12 hours on the skin once daily. Remove & discard patch within 12 hours or as directed by MD. Do not start before March 20, 2024.       melatonin 3 mg tablet      Take 1 tablet (3 mg) by mouth once daily as needed for sleep.       pantoprazole 40 mg EC tablet  Commonly known as: ProtoNix      Take 1 tablet (40 mg) by mouth 2 times a day. Do not crush, chew, or split.       polyethylene glycol 17 gram packet  Commonly known as: Glycolax, Miralax      Take 17 g by mouth once daily as needed (Constipation).       potassium chloride 20 mEq packet  Commonly known as: Klor-Con  Start taking on: March 20, 2024      Take 20 mEq by mouth once daily. Do not start before March 20, 2024.       sodium bicarbonate 650 mg tablet      Take 1 tablet (650 mg) by mouth 2 times a day.       sodium bicarbonate 325 mg tablet      Take 1 tablet (325 mg) by mouth 2 times a day.              CHANGE how you take these medications         Instructions Last Dose Given Next Dose Due   insulin lispro 100 unit/mL injection  Commonly known as: HumaLOG  What changed: how much to take      Inject 0-0.05 mL (0-5 Units) under the skin 4 times a day before meals. Take as directed per insulin instructions.              CONTINUE taking these medications        Instructions Last Dose Given Next Dose Due   acetaminophen 325 mg tablet  Commonly known as: Tylenol           atorvastatin 80 mg tablet  Commonly known as: Lipitor           bisacodyl 10 mg suppository  Commonly known as: Dulcolax           brimonidine 0.2 % ophthalmic solution  Commonly known as: AlphaGAN           carbidopa-levodopa  mg tablet  Commonly known as: Sinemet           cholecalciferol 25 MCG (1000 UT) tablet  Commonly known as: Vitamin D-3           gabapentin 100 mg capsule  Commonly known as: Neurontin           metoprolol succinate XL 50 mg 24 hr tablet  Commonly known as: Toprol-XL           Normal Saline Flush flush  Generic drug: sodium chloride 0.9%           thiamine 100 mg tablet  Commonly known as: Vitamin B-1           warfarin 2.5 mg tablet  Commonly known as: Coumadin                  STOP taking these medications      alum-mag hydroxide-simeth 200-200-20 mg/5 mL oral suspension  Commonly known as: Mylanta        cefTRIAXone 2 gram recon soln        enoxaparin 100 mg/mL syringe  Commonly known as: Lovenox        glipiZIDE 5 mg tablet  Commonly known as: Glucotrol        Jardiance 10 mg  Generic drug: empagliflozin        magnesium hydroxide 400 mg/5 mL suspension  Commonly known as: Milk of Magnesia        nitroglycerin 0.4 mg SL tablet  Commonly known as: Nitrostat                  Where to Get Your Medications        These medications were sent to [facility] [address]      Phone: [phone]   potassium chloride 20 mEq packet  sodium bicarbonate 325 mg tablet       Information about where to get  these medications is not yet available    Ask your nurse or doctor about these medications  amLODIPine 5 mg tablet  cefTRIAXone 2 gram/50 mL IV  furosemide 40 mg tablet  hydrALAZINE 25 mg tablet  insulin lispro 100 unit/mL injection  lidocaine 4 % patch  melatonin 3 mg tablet  pantoprazole 40 mg EC tablet  polyethylene glycol 17 gram packet  sodium bicarbonate 650 mg tablet         Test Results Pending At Discharge  Pending Labs       No current pending labs.            Hospital Course  Patient is a 78-year-old male with history of hyperlipidemia, hypertension, tenosynovitis of the right wrist on IV antibiotics, late latent syphilis presented from SNF with worsening weakness, fatigue, melena, patient with history of GI bleed, recent EGD showed nonbleeding ulcers, patient was evaluated by GI, treatment with PPI is recommended and there was no indication for EGD, hemoglobin is stable, while hospitalized patient was evaluated by speech therapy, nephrology, pulmonology, patient improved and was discharged back to SNF.    Discharge time 35 minutes    Pertinent Physical Exam At Time of Discharge  Physical Exam  HENT:      Head: Normocephalic and atraumatic.      Mouth/Throat:      Mouth: Mucous membranes are moist.      Pharynx: Oropharynx is clear.   Eyes:      Pupils: Pupils are equal, round, and reactive to light.   Cardiovascular:      Rate and Rhythm: Normal rate.      Pulses: Normal pulses.      Heart sounds: Normal heart sounds.   Pulmonary:      Effort: Pulmonary effort is normal.      Breath sounds: Normal breath sounds.   Abdominal:      General: Abdomen is flat. Bowel sounds are normal.      Palpations: Abdomen is soft.   Neurological:      General: No focal deficit present.      Mental Status: He is alert and oriented to person, place, and time. Mental status is at baseline.         Outpatient Follow-Up  No future appointments.      Rufino Osman MD

## 2024-03-29 ENCOUNTER — NURSING HOME VISIT (OUTPATIENT)
Dept: POST ACUTE CARE | Facility: EXTERNAL LOCATION | Age: 79
End: 2024-03-29
Payer: MEDICARE

## 2024-03-29 DIAGNOSIS — Z79.4 INSULIN DEPENDENT TYPE 2 DIABETES MELLITUS (MULTI): ICD-10-CM

## 2024-03-29 DIAGNOSIS — E11.9 INSULIN DEPENDENT TYPE 2 DIABETES MELLITUS (MULTI): ICD-10-CM

## 2024-03-29 DIAGNOSIS — G20.A1 PARKINSON'S DISEASE, UNSPECIFIED WHETHER DYSKINESIA PRESENT, UNSPECIFIED WHETHER MANIFESTATIONS FLUCTUATE (MULTI): Chronic | ICD-10-CM

## 2024-03-29 DIAGNOSIS — I10 HYPERTENSION, UNSPECIFIED TYPE: Chronic | ICD-10-CM

## 2024-03-29 DIAGNOSIS — J90 PLEURAL EFFUSION, BILATERAL: ICD-10-CM

## 2024-03-29 DIAGNOSIS — D64.9 NORMOCYTIC ANEMIA: ICD-10-CM

## 2024-03-29 DIAGNOSIS — M79.89 SWELLING OF EXTREMITY, RIGHT: ICD-10-CM

## 2024-03-29 DIAGNOSIS — N17.9 AKI (ACUTE KIDNEY INJURY) (CMS-HCC): ICD-10-CM

## 2024-03-29 DIAGNOSIS — K92.1 GASTROINTESTINAL HEMORRHAGE WITH MELENA: ICD-10-CM

## 2024-03-29 DIAGNOSIS — M65.831 EXTENSOR TENOSYNOVITIS OF RIGHT WRIST: Primary | ICD-10-CM

## 2024-03-29 DIAGNOSIS — A52.8 LATE LATENT SYPHILIS: Chronic | ICD-10-CM

## 2024-03-29 DIAGNOSIS — D62 ACUTE BLOOD LOSS ANEMIA: ICD-10-CM

## 2024-03-29 DIAGNOSIS — I48.91 ATRIAL FIBRILLATION, UNSPECIFIED TYPE (MULTI): ICD-10-CM

## 2024-03-29 DIAGNOSIS — F01.50 VASCULAR DEMENTIA WITHOUT BEHAVIORAL DISTURBANCE, PSYCHOTIC DISTURBANCE, MOOD DISTURBANCE, OR ANXIETY, UNSPECIFIED DEMENTIA SEVERITY (MULTI): ICD-10-CM

## 2024-03-29 PROCEDURE — 99309 SBSQ NF CARE MODERATE MDM 30: CPT

## 2024-03-29 NOTE — LETTER
Patient: Vamsi Benson  : 1945    Encounter Date: 2024    Visit  Note   Subjective  Vamsi Benson is a 78 y.o. male who is being seen and evaluated for multiple medical problems. Nursing notes, vital signs, and labs were reviewed in the local facility chart.    This is a 78-year-old male who was hospitalized on 2024 for right hand abscess with tenosynovitis (status post I&D on 2024 at [facility]) that was complicated by acute blood loss anemia with a presenting hemoglobin of 7, latent syphilis, melena, acute kidney injury on CKD stage II (baseline creatinine 1.2), pleural effusions. He received 1 unit of PRBCs while hospitalized for a hemoglobin 6.7 that recovered to 8.1. GI specialist suspect slow bleeding from known ulcers after recent EGD and had recommended PPI BID and to hold anticoagulation for 5 days. Nephrology optimized his kidney injury and fluid corrective status as the pleural effusions were thought to be fluid overload after kidney recovery and he was gently diuresed successfully. His coumadin was resumed and he was discharged to [facility] where wound team mechanically debrided the wound and removed 4 sutures on 3/26/2024. Ultimately, it was decided to transfer to [facility] to continue his rehabilitation.     He has a past medical history of coronary artery disease (status post CABG ), hypertension, hyperlipidemia, atrial fibrillation, GERD, peptic ulcer disease, Nissen, parkinsonism, embolic CVA with vascular dementia. Recently hospitalized with group A strep bacteremia, GI bleed (s/p EGD 2024) nonbleeding ulcers in swelling of gastric outlet.        Objective  There were no vitals taken for this visit.  Physical Exam  Vitals reviewed.   Constitutional:       Appearance: Normal appearance.   HENT:      Head: Normocephalic.   Cardiovascular:      Rate and Rhythm: Normal rate and regular rhythm.   Pulmonary:      Effort:  Pulmonary effort is normal. No respiratory distress.      Breath sounds: Normal breath sounds. No stridor. No wheezing, rhonchi or rales.   Abdominal:      General: Bowel sounds are normal.      Palpations: Abdomen is soft.   Musculoskeletal:      Cervical back: Neck supple.   Skin:     General: Skin is warm and dry.      Capillary Refill: Capillary refill takes less than 2 seconds.      Comments: RUE +2 edema forearm area mild erythema; right hand dressing intact + 2 radial pulse    Neurological:      Mental Status: He is alert.       Assessment/Plan  Plan for weekly INR checks, CBC, BMP, LFTs  Problem List Items Addressed This Visit       Gastrointestinal hemorrhage with melena     S/p egd indicative of ulcers; PPI BID continued for protection. Will trend H/H weekly while he is here         Parkinson's disease (Chronic)     Continue sinemet          Normocytic anemia    Late latent syphilis (Chronic)     most recent RPR 1:1. Reported receiving 2 doses of IM penicillin and received a third dose third dose on 3/11. He follows with ID at [facility].          HTN (hypertension) (Chronic)    Extensor tenosynovitis of right wrist - Primary     This was the main reason for his hospitalization. It is planned to continue his antibiotic coverage with rocephin through 3/30; I extended this coverage today through 4/2 as wound nurse is reporting it is odorous with purulent drainage present. Appreciate wound team recommendations.          Atrial fibrillation (CMS/HCC)     Managed with metoprolol and coumadin         Insulin dependent type 2 diabetes mellitus (CMS/HCC)     On sliding scale only; added lantus today 3U nightly. Will titrate based on glucose values.  Lab Results   Component Value Date    HGBA1C 6.4 (H) 03/20/2024             FRANCIE (acute kidney injury) (CMS/HCC)     Lab Results   Component Value Date    GLUCOSE 123 (H) 03/19/2024    CALCIUM 7.9 (L) 03/19/2024     03/19/2024    K 3.7 03/19/2024    CO2 29  03/19/2024     03/19/2024    BUN 33 (H) 03/19/2024    CREATININE 1.90 (H) 03/19/2024   Most recent creatinine 2.1 on 3/25/2024; we will plan for repeat labs next week.          Pleural effusion, bilateral     Patient is to continue with oral lasix. Clinical examination was negative; asymptomatic.          Swelling of extremity, right     Ultrasound ruled out DVT; patient is to continue with IV antibiotics through 4/2         Vascular dementia without behavioral disturbance, psychotic disturbance, mood disturbance, or anxiety, unspecified dementia severity (CMS/HCC)     No reported mood or behavioral disturbances. Will continue supportive care.           Other Visit Diagnoses       Acute blood loss anemia                   Electronically Signed By: CARLO Mcdaniel-CNP   3/30/24  7:33 AM

## 2024-03-30 PROBLEM — F01.50 VASCULAR DEMENTIA WITHOUT BEHAVIORAL DISTURBANCE, PSYCHOTIC DISTURBANCE, MOOD DISTURBANCE, OR ANXIETY, UNSPECIFIED DEMENTIA SEVERITY (MULTI): Status: ACTIVE | Noted: 2024-03-30

## 2024-03-30 NOTE — ASSESSMENT & PLAN NOTE
Lab Results   Component Value Date    GLUCOSE 123 (H) 03/19/2024    CALCIUM 7.9 (L) 03/19/2024     03/19/2024    K 3.7 03/19/2024    CO2 29 03/19/2024     03/19/2024    BUN 33 (H) 03/19/2024    CREATININE 1.90 (H) 03/19/2024     Most recent creatinine 2.1 on 3/25/2024; we will plan for repeat labs next week.

## 2024-03-30 NOTE — ASSESSMENT & PLAN NOTE
most recent RPR 1:1. Reported receiving 2 doses of IM penicillin and received a third dose third dose on 3/11. He follows with ID at [facility].

## 2024-03-30 NOTE — ASSESSMENT & PLAN NOTE
On sliding scale only; added lantus today 3U nightly. Will titrate based on glucose values.  Lab Results   Component Value Date    HGBA1C 6.4 (H) 03/20/2024

## 2024-03-30 NOTE — ASSESSMENT & PLAN NOTE
S/p egd indicative of ulcers; PPI BID continued for protection. Will trend H/H weekly while he is here

## 2024-03-30 NOTE — PROGRESS NOTES
Visit  Note   Subjective   Vamsi Benson is a 78 y.o. male who is being seen and evaluated for multiple medical problems. Nursing notes, vital signs, and labs were reviewed in the local facility chart.    This is a 78-year-old male who was hospitalized on March 8, 2024 for right hand abscess with tenosynovitis (status post I&D on 2/23/2024 at [facility]) that was complicated by acute blood loss anemia with a presenting hemoglobin of 7, latent syphilis, melena, acute kidney injury on CKD stage II (baseline creatinine 1.2), pleural effusions. He received 1 unit of PRBCs while hospitalized for a hemoglobin 6.7 that recovered to 8.1. GI specialist suspect slow bleeding from known ulcers after recent EGD and had recommended PPI BID and to hold anticoagulation for 5 days. Nephrology optimized his kidney injury and fluid corrective status as the pleural effusions were thought to be fluid overload after kidney recovery and he was gently diuresed successfully. His coumadin was resumed and he was discharged to [facility] where wound team mechanically debrided the wound and removed 4 sutures on 3/26/2024. Ultimately, it was decided to transfer to [facility] to continue his rehabilitation.     He has a past medical history of coronary artery disease (status post CABG 2016), hypertension, hyperlipidemia, atrial fibrillation, GERD, peptic ulcer disease, Nissen, parkinsonism, embolic CVA with vascular dementia. Recently hospitalized with group A strep bacteremia, GI bleed (s/p EGD 03/04/2024) nonbleeding ulcers in swelling of gastric outlet.        Objective   There were no vitals taken for this visit.  Physical Exam  Vitals reviewed.   Constitutional:       Appearance: Normal appearance.   HENT:      Head: Normocephalic.   Cardiovascular:      Rate and Rhythm: Normal rate and regular rhythm.   Pulmonary:      Effort: Pulmonary effort is normal. No respiratory distress.      Breath sounds:  Normal breath sounds. No stridor. No wheezing, rhonchi or rales.   Abdominal:      General: Bowel sounds are normal.      Palpations: Abdomen is soft.   Musculoskeletal:      Cervical back: Neck supple.   Skin:     General: Skin is warm and dry.      Capillary Refill: Capillary refill takes less than 2 seconds.      Comments: RUE +2 edema forearm area mild erythema; right hand dressing intact + 2 radial pulse    Neurological:      Mental Status: He is alert.       Assessment/Plan   Plan for weekly INR checks, CBC, BMP, LFTs  Problem List Items Addressed This Visit       Gastrointestinal hemorrhage with melena     S/p egd indicative of ulcers; PPI BID continued for protection. Will trend H/H weekly while he is here         Parkinson's disease (Chronic)     Continue sinemet          Normocytic anemia    Late latent syphilis (Chronic)     most recent RPR 1:1. Reported receiving 2 doses of IM penicillin and received a third dose third dose on 3/11. He follows with ID at [facility].          HTN (hypertension) (Chronic)    Extensor tenosynovitis of right wrist - Primary     This was the main reason for his hospitalization. It is planned to continue his antibiotic coverage with rocephin through 3/30; I extended this coverage today through 4/2 as wound nurse is reporting it is odorous with purulent drainage present. Appreciate wound team recommendations.          Atrial fibrillation (CMS/HCC)     Managed with metoprolol and coumadin         Insulin dependent type 2 diabetes mellitus (CMS/HCC)     On sliding scale only; added lantus today 3U nightly. Will titrate based on glucose values.  Lab Results   Component Value Date    HGBA1C 6.4 (H) 03/20/2024             FRANCIE (acute kidney injury) (CMS/HCC)     Lab Results   Component Value Date    GLUCOSE 123 (H) 03/19/2024    CALCIUM 7.9 (L) 03/19/2024     03/19/2024    K 3.7 03/19/2024    CO2 29 03/19/2024     03/19/2024    BUN 33 (H) 03/19/2024    CREATININE 1.90  (H) 03/19/2024   Most recent creatinine 2.1 on 3/25/2024; we will plan for repeat labs next week.          Pleural effusion, bilateral     Patient is to continue with oral lasix. Clinical examination was negative; asymptomatic.          Swelling of extremity, right     Ultrasound ruled out DVT; patient is to continue with IV antibiotics through 4/2         Vascular dementia without behavioral disturbance, psychotic disturbance, mood disturbance, or anxiety, unspecified dementia severity (CMS/HCC)     No reported mood or behavioral disturbances. Will continue supportive care.           Other Visit Diagnoses       Acute blood loss anemia

## 2024-03-30 NOTE — ASSESSMENT & PLAN NOTE
This was the main reason for his hospitalization. It is planned to continue his antibiotic coverage with rocephin through 3/30; I extended this coverage today through 4/2 as wound nurse is reporting it is odorous with purulent drainage present. Appreciate wound team recommendations.

## 2024-04-02 ENCOUNTER — NURSING HOME VISIT (OUTPATIENT)
Dept: PRIMARY CARE | Facility: CLINIC | Age: 79
End: 2024-04-02
Payer: MEDICARE

## 2024-04-02 DIAGNOSIS — K92.1 GASTROINTESTINAL HEMORRHAGE WITH MELENA: ICD-10-CM

## 2024-04-02 DIAGNOSIS — L89.152 PRESSURE INJURY OF SACRAL REGION, STAGE 2 (MULTI): ICD-10-CM

## 2024-04-02 DIAGNOSIS — M65.831 EXTENSOR TENOSYNOVITIS OF RIGHT WRIST: Primary | ICD-10-CM

## 2024-04-02 DIAGNOSIS — I48.91 ATRIAL FIBRILLATION, UNSPECIFIED TYPE (MULTI): ICD-10-CM

## 2024-04-02 DIAGNOSIS — Z79.4 INSULIN DEPENDENT TYPE 2 DIABETES MELLITUS (MULTI): ICD-10-CM

## 2024-04-02 DIAGNOSIS — E11.9 INSULIN DEPENDENT TYPE 2 DIABETES MELLITUS (MULTI): ICD-10-CM

## 2024-04-02 PROBLEM — N17.9 AKI (ACUTE KIDNEY INJURY) (CMS-HCC): Status: RESOLVED | Noted: 2024-03-09 | Resolved: 2024-04-02

## 2024-04-02 PROBLEM — L03.90 CELLULITIS: Status: RESOLVED | Noted: 2024-02-22 | Resolved: 2024-04-02

## 2024-04-02 PROCEDURE — 99306 1ST NF CARE HIGH MDM 50: CPT | Performed by: FAMILY MEDICINE

## 2024-04-02 NOTE — ASSESSMENT & PLAN NOTE
Lab Results   Component Value Date    HGBA1C 6.4 (H) 03/20/2024   Blood sugars have ranged between 150 and 173 since initiating the low-dose insulin.  This appears to be at goal

## 2024-04-02 NOTE — PROGRESS NOTES
Admission H&P  Subjective   Vamsi Benson is a 78 y.o. male who is being seen for an admission H&P.  Nursing notes, vital signs, and labs were reviewed in the local facility chart and he  is being evaluated for multiple medical problems.     HPI careful review of the hospital record indicates that this 78-year-old male was hospitalized on March 8 for a right hand abscess with tenosynovitis.  He had incision and drainage complicated by blood loss anemia with a hemoglobin as low as 7 and acute kidney injury.  He did receive a blood transfusion while in the hospital and hemoglobin improved to 8.1.  Gastroenterology was consulted and suspected a slow bleed from known stomach ulcers seen on recent EGD.  He was treated with PPI and careful monitoring.  He was treated with intravenous Rocephin for suspected infection.  Today wound care team is at bedside and we evaluated the wound together as well as the sacral wound.  He is still complaining with significant amount of pain in both areas  Objective   There were no vitals taken for this visit.  Physical Exam  Constitutional:       Appearance: Normal appearance.   HENT:      Head: Normocephalic.   Eyes:      Conjunctiva/sclera: Conjunctivae normal.   Cardiovascular:      Rate and Rhythm: Normal rate and regular rhythm.      Heart sounds: Normal heart sounds.   Pulmonary:      Effort: Pulmonary effort is normal.      Breath sounds: Normal breath sounds.   Musculoskeletal:      Cervical back: Neck supple.   Skin:     General: Skin is warm and dry.      Comments: There is a small open area with yellow slough midline sacrum without erythema or induration surrounding.    The right hand is edematous around the thumb dorsally.  There is no erythema.  There is a hard dry brown eschar over the large wound.  There is no fluctuance.   Neurological:      Mental Status: He is alert.       Assessment/Plan   Problem List Items Addressed This Visit       Gastrointestinal hemorrhage with  melena     S/p egd indicative of ulcers; PPI BID continued for protection. Will trend H/H weekly while he is here.  Labs are pending for tomorrow         Extensor tenosynovitis of right wrist - Primary     This 78-year-old male had incision and drainage of an infected tenosynovitis and is now completed a course of IV Rocephin which was extended for another week.  On examination today with the wound care team present there is no erythema or induration or purulent discharge and so it appears that the infection is cleared.  We will watch him closely for the next few days after the Rocephin has completed.  He will get a Santyl dressing for chemical debridement and be followed closely by the local wound care nursing staff.  We will continue weekly labs to assess for any sign of recurrent infection.  An MRI of the hand was done originally which did not show osteomyelitis but that was about a month ago.  Will go ahead and get a plain x-ray to rule out any suspicion for osteomyelitis now that we are discontinuing the antibiotic.  Diabetes will be tightly controlled and the patient will be encouraged to consume a higher protein calorie diet for wound healing         Atrial fibrillation (CMS/HCC)     Managed with metoprolol and coumadin         Insulin dependent type 2 diabetes mellitus (CMS/[facility])     Lab Results   Component Value Date    HGBA1C 6.4 (H) 03/20/2024   Blood sugars have ranged between 150 and 173 since initiating the low-dose insulin.  This appears to be at goal         Pressure injury of sacral region, stage 2 (CMS/HCC)     I pressure ulcer is noted in the center of the sacrum.  Fortunately there is no erythema and no discharge from the ulcer.  There is a yellow eschar overlying.  After consultation with the wound care team we will treat with Santyl debriding dressing change daily as well as pressure relief

## 2024-04-02 NOTE — ASSESSMENT & PLAN NOTE
This 78-year-old male had incision and drainage of an infected tenosynovitis and is now completed a course of IV Rocephin which was extended for another week.  On examination today with the wound care team present there is no erythema or induration or purulent discharge and so it appears that the infection is cleared.  We will watch him closely for the next few days after the Rocephin has completed.  He will get a Santyl dressing for chemical debridement and be followed closely by the local wound care nursing staff.  We will continue weekly labs to assess for any sign of recurrent infection.  An MRI of the hand was done originally which did not show osteomyelitis but that was about a month ago.  Will go ahead and get a plain x-ray to rule out any suspicion for osteomyelitis now that we are discontinuing the antibiotic.  Diabetes will be tightly controlled and the patient will be encouraged to consume a higher protein calorie diet for wound healing

## 2024-04-02 NOTE — ASSESSMENT & PLAN NOTE
S/p egd indicative of ulcers; PPI BID continued for protection. Will trend H/H weekly while he is here.  Labs are pending for tomorrow

## 2024-04-02 NOTE — ASSESSMENT & PLAN NOTE
I pressure ulcer is noted in the center of the sacrum.  Fortunately there is no erythema and no discharge from the ulcer.  There is a yellow eschar overlying.  After consultation with the wound care team we will treat with Siayl debriding dressing change daily as well as pressure relief

## 2024-04-03 ENCOUNTER — NURSING HOME VISIT (OUTPATIENT)
Dept: POST ACUTE CARE | Facility: EXTERNAL LOCATION | Age: 79
End: 2024-04-03
Payer: MEDICARE

## 2024-04-03 DIAGNOSIS — A52.8 LATE LATENT SYPHILIS: Chronic | ICD-10-CM

## 2024-04-03 DIAGNOSIS — K92.1 GASTROINTESTINAL HEMORRHAGE WITH MELENA: ICD-10-CM

## 2024-04-03 DIAGNOSIS — G20.A1 PARKINSON'S DISEASE, UNSPECIFIED WHETHER DYSKINESIA PRESENT, UNSPECIFIED WHETHER MANIFESTATIONS FLUCTUATE (MULTI): Chronic | ICD-10-CM

## 2024-04-03 DIAGNOSIS — J90 PLEURAL EFFUSION, BILATERAL: ICD-10-CM

## 2024-04-03 DIAGNOSIS — D62 ACUTE BLOOD LOSS ANEMIA: ICD-10-CM

## 2024-04-03 DIAGNOSIS — L89.152 PRESSURE INJURY OF SACRAL REGION, STAGE 2 (MULTI): ICD-10-CM

## 2024-04-03 DIAGNOSIS — M79.89 SWELLING OF EXTREMITY, RIGHT: ICD-10-CM

## 2024-04-03 DIAGNOSIS — M65.831 EXTENSOR TENOSYNOVITIS OF RIGHT WRIST: ICD-10-CM

## 2024-04-03 DIAGNOSIS — Z79.4 INSULIN DEPENDENT TYPE 2 DIABETES MELLITUS (MULTI): ICD-10-CM

## 2024-04-03 DIAGNOSIS — I48.91 ATRIAL FIBRILLATION, UNSPECIFIED TYPE (MULTI): Primary | ICD-10-CM

## 2024-04-03 DIAGNOSIS — E44.0 MALNUTRITION OF MODERATE DEGREE (MULTI): ICD-10-CM

## 2024-04-03 DIAGNOSIS — E11.9 INSULIN DEPENDENT TYPE 2 DIABETES MELLITUS (MULTI): ICD-10-CM

## 2024-04-03 PROCEDURE — 99309 SBSQ NF CARE MODERATE MDM 30: CPT

## 2024-04-03 NOTE — LETTER
Patient: Vamsi Benson  : 1945    Encounter Date: 2024    Visit  Note   Subjective  Vamsi Benson is a 78 y.o. male who is being seen and evaluated for multiple medical problems. Nursing notes, vital signs, and labs were reviewed in the local facility chart.    Patient was evaluated for a follow up on his XR of his hand and continued medical care while at [facility]. Also, dietitian weighed in on his weights today and appetite. Patient has no other concerns today.        Objective  There were no vitals taken for this visit.  Physical Exam  Vitals reviewed.   Constitutional:       Appearance: Normal appearance.   HENT:      Head: Normocephalic.   Cardiovascular:      Rate and Rhythm: Normal rate and regular rhythm.   Pulmonary:      Effort: Pulmonary effort is normal. No respiratory distress.      Breath sounds: Normal breath sounds. No wheezing, rhonchi or rales.   Musculoskeletal:      Cervical back: Neck supple.   Skin:     General: Skin is warm and dry.      Comments: Wound was evaluated Monday; see picture under media of right hand   Neurological:      Mental Status: He is alert. Mental status is at baseline.       Assessment/Plan  Plan for MRI of right hand  Start remeron for appetite support  Rest of the plan as follows below  Problem List Items Addressed This Visit       Gastrointestinal hemorrhage with melena     S/p egd indicative of ulcers; PPI BID continued for protection. Will trend H/H weekly while he is here         Parkinson's disease (CMS/HCC) (Chronic)     Continue sinemet          Malnutrition of moderate degree (CMS/HCC)     Added remeron 7.5 mg at bedtime for appetite support.         Late latent syphilis (Chronic)     most recent RPR 1:1. Reported receiving 2 doses of IM penicillin and received a third dose third dose on 3/11. He follows with ID at [facility].          Extensor tenosynovitis of right wrist     Infected tenosynovitis rocephin completed; XR of the  right hand requesting MRI for osteomyelitis rule out. MRI of the hand ulna/radius views ordered without contrast         Atrial fibrillation (CMS/[facility]) - Primary     Managed with metoprolol and coumadin. We will continue with weekly INR montioring while he is here         Insulin dependent type 2 diabetes mellitus (CMS/[facility])     Lab Results   Component Value Date    HGBA1C 6.4 (H) 03/20/2024    He is currently at goal. We will continue to titrate insulins         Pleural effusion, bilateral     Patient is to continue with oral lasix. Clinical examination was negative; asymptomatic.          Swelling of extremity, right     This appears to be improving. Will continue to monitor while he is now off antibiotics.         Pressure injury of sacral region, stage 2 (CMS/[facility])     pressure ulcer is noted in the center of the sacrum.  After consultation with the wound care team we will treat with Santyl debriding dressing change daily as well as pressure relief          Other Visit Diagnoses       Acute blood loss anemia                   Electronically Signed By: BRENDA Mcdaniel   4/3/24  5:17 PM

## 2024-04-03 NOTE — PROGRESS NOTES
Visit  Note   Subjective   Vamsi Benson is a 78 y.o. male who is being seen and evaluated for multiple medical problems. Nursing notes, vital signs, and labs were reviewed in the local facility chart.    Patient was evaluated for a follow up on his XR of his hand and continued medical care while at [facility]. Also, dietitian weighed in on his weights today and appetite. Patient has no other concerns today.        Objective   There were no vitals taken for this visit.  Physical Exam  Vitals reviewed.   Constitutional:       Appearance: Normal appearance.   HENT:      Head: Normocephalic.   Cardiovascular:      Rate and Rhythm: Normal rate and regular rhythm.   Pulmonary:      Effort: Pulmonary effort is normal. No respiratory distress.      Breath sounds: Normal breath sounds. No wheezing, rhonchi or rales.   Musculoskeletal:      Cervical back: Neck supple.   Skin:     General: Skin is warm and dry.      Comments: Wound was evaluated Monday; see picture under media of right hand   Neurological:      Mental Status: He is alert. Mental status is at baseline.       Assessment/Plan   Plan for MRI of right hand  Start remeron for appetite support  Rest of the plan as follows below  Problem List Items Addressed This Visit       Gastrointestinal hemorrhage with melena     S/p egd indicative of ulcers; PPI BID continued for protection. Will trend H/H weekly while he is here         Parkinson's disease (CMS/HCC) (Chronic)     Continue sinemet          Malnutrition of moderate degree (CMS/HCC)     Added remeron 7.5 mg at bedtime for appetite support.         Late latent syphilis (Chronic)     most recent RPR 1:1. Reported receiving 2 doses of IM penicillin and received a third dose third dose on 3/11. He follows with ID at [facility]          Extensor tenosynovitis of right wrist     Infected tenosynovitis rocephin completed; XR of the right hand requesting MRI for osteomyelitis rule out. MRI of the hand  ulna/radius views ordered without contrast         Atrial fibrillation (CMS/[facility]) - Primary     Managed with metoprolol and coumadin. We will continue with weekly INR montioring while he is here         Insulin dependent type 2 diabetes mellitus (CMS/[facility])     Lab Results   Component Value Date    HGBA1C 6.4 (H) 03/20/2024    He is currently at goal. We will continue to titrate insulins         Pleural effusion, bilateral     Patient is to continue with oral lasix. Clinical examination was negative; asymptomatic.          Swelling of extremity, right     This appears to be improving. Will continue to monitor while he is now off antibiotics.         Pressure injury of sacral region, stage 2 (CMS/[facility])     pressure ulcer is noted in the center of the sacrum.  After consultation with the wound care team we will treat with Santyl debriding dressing change daily as well as pressure relief          Other Visit Diagnoses       Acute blood loss anemia

## 2024-04-03 NOTE — ASSESSMENT & PLAN NOTE
pressure ulcer is noted in the center of the sacrum.  After consultation with the wound care team we will treat with Santyl debriding dressing change daily as well as pressure relief

## 2024-04-03 NOTE — ASSESSMENT & PLAN NOTE
Infected tenosynovitis rocephin completed; XR of the right hand requesting MRI for osteomyelitis rule out. MRI of the hand ulna/radius views ordered without contrast

## 2024-04-03 NOTE — ASSESSMENT & PLAN NOTE
Lab Results   Component Value Date    HGBA1C 6.4 (H) 03/20/2024    He is currently at goal. We will continue to titrate insulins

## 2024-04-03 NOTE — ASSESSMENT & PLAN NOTE
Managed with metoprolol and coumadin. We will continue with weekly INR montioring while he is here

## 2024-04-03 NOTE — ASSESSMENT & PLAN NOTE
most recent RPR 1:1. Reported receiving 2 doses of IM penicillin and received a third dose third dose on 3/11. He follows with ID at [facility].

## 2024-04-05 ENCOUNTER — NURSING HOME VISIT (OUTPATIENT)
Dept: POST ACUTE CARE | Facility: EXTERNAL LOCATION | Age: 79
End: 2024-04-05
Payer: MEDICARE

## 2024-04-05 DIAGNOSIS — M79.18 INTERCOSTAL MUSCLE PAIN: Primary | ICD-10-CM

## 2024-04-05 DIAGNOSIS — M65.831 EXTENSOR TENOSYNOVITIS OF RIGHT WRIST: ICD-10-CM

## 2024-04-05 DIAGNOSIS — R07.1 CHEST PAIN ON BREATHING: ICD-10-CM

## 2024-04-05 PROCEDURE — 99309 SBSQ NF CARE MODERATE MDM 30: CPT

## 2024-04-05 NOTE — PROGRESS NOTES
Visit  Note   Subjective   Vamsi Benson is a 78 y.o. male who is being seen and evaluated for multiple medical problems. Nursing notes, vital signs, and labs were reviewed in the local facility chart.    While rounding, notified of patient complaint of chest pain. Came to evaluate patient and he is rating his chest pain a 3/10 currently on the left side of his chest. He confirms it is worse with deep breathing and states it is an aching pain.        Objective   There were no vitals taken for this visit.  Physical Exam  Vitals reviewed.   Constitutional:       General: He is not in acute distress.     Appearance: Normal appearance. He is not ill-appearing or toxic-appearing.   HENT:      Head: Normocephalic.   Cardiovascular:      Rate and Rhythm: Normal rate. Rhythm irregular.   Pulmonary:      Effort: No respiratory distress.      Breath sounds: Normal breath sounds. No stridor. No wheezing, rhonchi or rales.   Chest:      Chest wall: No tenderness.   Musculoskeletal:      Cervical back: Neck supple.   Skin:     General: Skin is warm and dry.   Neurological:      Mental Status: He is alert.       Assessment/Plan   He does not appear in any acute distress at this time;  on-site EKG today.   Recommended tylenol for the pain. Reviewed EKG no immediate concerns. Patient has been refusing his medications and refused tylenol as well as routine medications today and food. Will continue to monitor  Encouraged to take medications  Plan for MRI right hand; reviewed most recent labs no current concerns INR within range at 2.3  Problem List Items Addressed This Visit       Extensor tenosynovitis of right wrist    Relevant Orders    MR hand right wo IV contrast     Other Visit Diagnoses       Intercostal muscle pain    -  Primary    Chest pain on breathing

## 2024-04-05 NOTE — LETTER
Patient: Vamsi Benson  : 1945    Encounter Date: 2024    Visit  Note   Subjective  Vamsi Benson is a 78 y.o. male who is being seen and evaluated for multiple medical problems. Nursing notes, vital signs, and labs were reviewed in the local facility chart.    While rounding, notified of patient complaint of chest pain. Came to evaluate patient and he is rating his chest pain a 3/10 currently on the left side of his chest. He confirms it is worse with deep breathing and states it is an aching pain.        Objective  There were no vitals taken for this visit.  Physical Exam  Vitals reviewed.   Constitutional:       General: He is not in acute distress.     Appearance: Normal appearance. He is not ill-appearing or toxic-appearing.   HENT:      Head: Normocephalic.   Cardiovascular:      Rate and Rhythm: Normal rate. Rhythm irregular.   Pulmonary:      Effort: No respiratory distress.      Breath sounds: Normal breath sounds. No stridor. No wheezing, rhonchi or rales.   Chest:      Chest wall: No tenderness.   Musculoskeletal:      Cervical back: Neck supple.   Skin:     General: Skin is warm and dry.   Neurological:      Mental Status: He is alert.       Assessment/Plan  He does not appear in any acute distress at this time;  on-site EKG today.   Recommended tylenol for the pain. Reviewed EKG no immediate concerns. Patient has been refusing his medications and refused tylenol as well as routine medications today and food. Will continue to monitor  Encouraged to take medications  Plan for MRI right hand  Problem List Items Addressed This Visit       Extensor tenosynovitis of right wrist    Relevant Orders    MR hand right wo IV contrast     Other Visit Diagnoses       Intercostal muscle pain    -  Primary    Chest pain on breathing                   Electronically Signed By: BRENDA Mcdaniel   24  6:32 PM

## 2024-04-10 ENCOUNTER — NURSING HOME VISIT (OUTPATIENT)
Dept: POST ACUTE CARE | Facility: EXTERNAL LOCATION | Age: 79
End: 2024-04-10
Payer: MEDICARE

## 2024-04-10 DIAGNOSIS — Z79.4 INSULIN DEPENDENT TYPE 2 DIABETES MELLITUS (MULTI): ICD-10-CM

## 2024-04-10 DIAGNOSIS — A52.8 LATE LATENT SYPHILIS: Chronic | ICD-10-CM

## 2024-04-10 DIAGNOSIS — K92.1 GASTROINTESTINAL HEMORRHAGE WITH MELENA: ICD-10-CM

## 2024-04-10 DIAGNOSIS — I48.91 ATRIAL FIBRILLATION, UNSPECIFIED TYPE (MULTI): ICD-10-CM

## 2024-04-10 DIAGNOSIS — L89.152 PRESSURE INJURY OF SACRAL REGION, STAGE 2 (MULTI): ICD-10-CM

## 2024-04-10 DIAGNOSIS — E11.9 INSULIN DEPENDENT TYPE 2 DIABETES MELLITUS (MULTI): ICD-10-CM

## 2024-04-10 DIAGNOSIS — M65.831 EXTENSOR TENOSYNOVITIS OF RIGHT WRIST: Primary | ICD-10-CM

## 2024-04-10 DIAGNOSIS — E44.0 MALNUTRITION OF MODERATE DEGREE (MULTI): ICD-10-CM

## 2024-04-10 DIAGNOSIS — F01.50 VASCULAR DEMENTIA WITHOUT BEHAVIORAL DISTURBANCE, PSYCHOTIC DISTURBANCE, MOOD DISTURBANCE, OR ANXIETY, UNSPECIFIED DEMENTIA SEVERITY (MULTI): ICD-10-CM

## 2024-04-10 PROCEDURE — 99308 SBSQ NF CARE LOW MDM 20: CPT

## 2024-04-10 NOTE — PROGRESS NOTES
Visit  Note   Subjective   Vamsi Benson is a 78 y.o. male who is being seen and evaluated for multiple medical problems. Nursing notes, vital signs, and labs were reviewed in the local facility chart.    Patient was evaluated today for general examination. He reports the chest discomfort he had has resolved and there are no further concerns. On examination today, he has no complaints.        Objective   There were no vitals taken for this visit.  Physical Exam  Vitals reviewed.   Constitutional:       Appearance: Normal appearance.   HENT:      Head: Normocephalic.   Cardiovascular:      Rate and Rhythm: Normal rate. Rhythm irregular.   Pulmonary:      Effort: Pulmonary effort is normal. No respiratory distress.      Breath sounds: Normal breath sounds. No stridor. No wheezing, rhonchi or rales.   Musculoskeletal:      Cervical back: Neck supple.      Right lower leg: No edema.      Left lower leg: No edema.      Comments: Right hand dressing intact   Skin:     General: Skin is warm and dry.   Neurological:      Mental Status: He is alert.       Assessment/Plan   Problem List Items Addressed This Visit       Gastrointestinal hemorrhage with melena     S/p egd indicative of ulcers; PPI BID continued for protection. Will trend H/H weekly while he is here         Malnutrition of moderate degree (CMS/HCC)     Currently on remeron for appetite support and dietitian is following while he is here.          Late latent syphilis (Chronic)     most recent RPR 1:1. Reported receiving 2 doses of IM penicillin and received a third dose third dose on 3/11. He follows with ID at [facility].          Extensor tenosynovitis of right wrist - Primary     Reviewed wound care notes; continue with ordered dressing changes; Infected tenosynovitis rocephin completed; XR of the right hand requesting MRI for osteomyelitis rule out. MRI of the hand  ordered without contrast; scheduled for 4/19         Atrial fibrillation (CMS/HCC)      Managed with metoprolol and coumadin. We will continue with weekly INR montioring while he is here; labs are pending this week         Insulin dependent type 2 diabetes mellitus (CMS/[facility])     Lab Results   Component Value Date    HGBA1C 6.4 (H) 03/20/2024    He is currently at goal. We will continue his current regimen         Vascular dementia without behavioral disturbance, psychotic disturbance, mood disturbance, or anxiety, unspecified dementia severity (CMS/[facility])     No reported mood or behavioral disturbances. Will continue supportive care.          Pressure injury of sacral region, stage 2 (CMS/[facility])     pressure ulcer is noted in the center of the sacrum.  After consultation with the wound care team we will treat with Santyl debriding dressing change daily as well as pressure relief

## 2024-04-10 NOTE — LETTER
Patient: Vamsi Benson  : 1945    Encounter Date: 04/10/2024    Visit  Note   Subjective  Vamsi Benson is a 78 y.o. male who is being seen and evaluated for multiple medical problems. Nursing notes, vital signs, and labs were reviewed in the local facility chart.    Patient was evaluated today for general examination. He reports the chest discomfort he had has resolved and there are no further concerns. On examination today, he has no complaints.        Objective  There were no vitals taken for this visit.  Physical Exam  Vitals reviewed.   Constitutional:       Appearance: Normal appearance.   HENT:      Head: Normocephalic.   Cardiovascular:      Rate and Rhythm: Normal rate. Rhythm irregular.   Pulmonary:      Effort: Pulmonary effort is normal. No respiratory distress.      Breath sounds: Normal breath sounds. No stridor. No wheezing, rhonchi or rales.   Musculoskeletal:      Cervical back: Neck supple.      Right lower leg: No edema.      Left lower leg: No edema.      Comments: Right hand dressing intact   Skin:     General: Skin is warm and dry.   Neurological:      Mental Status: He is alert.       Assessment/Plan  Problem List Items Addressed This Visit       Gastrointestinal hemorrhage with melena     S/p egd indicative of ulcers; PPI BID continued for protection. Will trend H/H weekly while he is here         Malnutrition of moderate degree (CMS/HCC)     Currently on remeron for appetite support and dietitian is following while he is here.          Late latent syphilis (Chronic)     most recent RPR 1:1. Reported receiving 2 doses of IM penicillin and received a third dose third dose on 3/11. He follows with ID at [facility].          Extensor tenosynovitis of right wrist - Primary     Reviewed wound care notes; continue with ordered dressing changes; Infected tenosynovitis rocephin completed; XR of the right hand requesting MRI for osteomyelitis rule out. MRI of the hand  ordered without  contrast; scheduled for 4/19         Atrial fibrillation (CMS/[facility])     Managed with metoprolol and coumadin. We will continue with weekly INR montioring while he is here; labs are pending this week         Insulin dependent type 2 diabetes mellitus (CMS/[facility])     Lab Results   Component Value Date    HGBA1C 6.4 (H) 03/20/2024    He is currently at goal. We will continue his current regimen         Vascular dementia without behavioral disturbance, psychotic disturbance, mood disturbance, or anxiety, unspecified dementia severity (CMS/[facility])     No reported mood or behavioral disturbances. Will continue supportive care.          Pressure injury of sacral region, stage 2 (CMS/[facility])     pressure ulcer is noted in the center of the sacrum.  After consultation with the wound care team we will treat with Santyl debriding dressing change daily as well as pressure relief               Electronically Signed By: BRENDA Mcdaniel   4/10/24  6:46 PM

## 2024-04-10 NOTE — ASSESSMENT & PLAN NOTE
Lab Results   Component Value Date    HGBA1C 6.4 (H) 03/20/2024    He is currently at goal. We will continue his current regimen

## 2024-04-10 NOTE — ASSESSMENT & PLAN NOTE
most recent RPR 1:1. Reported receiving 2 doses of IM penicillin and received a third dose third dose on 3/11. He follows with ID at [facility].

## 2024-04-10 NOTE — ASSESSMENT & PLAN NOTE
Reviewed wound care notes; continue with ordered dressing changes; Infected tenosynovitis rocephin completed; XR of the right hand requesting MRI for osteomyelitis rule out. MRI of the hand  ordered without contrast; scheduled for 4/19

## 2024-04-10 NOTE — ASSESSMENT & PLAN NOTE
Managed with metoprolol and coumadin. We will continue with weekly INR montioring while he is here; labs are pending this week

## 2024-04-17 ENCOUNTER — LAB REQUISITION (OUTPATIENT)
Dept: LAB | Facility: HOSPITAL | Age: 79
End: 2024-04-17
Payer: MEDICARE

## 2024-04-17 DIAGNOSIS — R53.1 WEAKNESS: ICD-10-CM

## 2024-04-17 LAB
INR PPP: 3.4 (ref 0.9–1.1)
PROTHROMBIN TIME: 38.5 SECONDS (ref 9.8–12.8)

## 2024-04-17 PROCEDURE — 85610 PROTHROMBIN TIME: CPT

## 2024-04-22 ENCOUNTER — NURSING HOME VISIT (OUTPATIENT)
Dept: POST ACUTE CARE | Facility: EXTERNAL LOCATION | Age: 79
End: 2024-04-22
Payer: MEDICARE

## 2024-04-22 DIAGNOSIS — I25.119 CORONARY ARTERY DISEASE WITH ANGINA PECTORIS, UNSPECIFIED VESSEL OR LESION TYPE, UNSPECIFIED WHETHER NATIVE OR TRANSPLANTED HEART (CMS-HCC): Chronic | ICD-10-CM

## 2024-04-22 DIAGNOSIS — Z79.4 INSULIN DEPENDENT TYPE 2 DIABETES MELLITUS (MULTI): ICD-10-CM

## 2024-04-22 DIAGNOSIS — K92.1 GASTROINTESTINAL HEMORRHAGE WITH MELENA: ICD-10-CM

## 2024-04-22 DIAGNOSIS — F01.50 VASCULAR DEMENTIA WITHOUT BEHAVIORAL DISTURBANCE, PSYCHOTIC DISTURBANCE, MOOD DISTURBANCE, OR ANXIETY, UNSPECIFIED DEMENTIA SEVERITY (MULTI): ICD-10-CM

## 2024-04-22 DIAGNOSIS — I48.91 ATRIAL FIBRILLATION, UNSPECIFIED TYPE (MULTI): ICD-10-CM

## 2024-04-22 DIAGNOSIS — G20.A1 PARKINSON'S DISEASE, UNSPECIFIED WHETHER DYSKINESIA PRESENT, UNSPECIFIED WHETHER MANIFESTATIONS FLUCTUATE (MULTI): Chronic | ICD-10-CM

## 2024-04-22 DIAGNOSIS — M79.89 SWELLING OF EXTREMITY, RIGHT: ICD-10-CM

## 2024-04-22 DIAGNOSIS — F41.9 ANXIETY: ICD-10-CM

## 2024-04-22 DIAGNOSIS — A52.8 LATE LATENT SYPHILIS: Chronic | ICD-10-CM

## 2024-04-22 DIAGNOSIS — E11.9 INSULIN DEPENDENT TYPE 2 DIABETES MELLITUS (MULTI): ICD-10-CM

## 2024-04-22 DIAGNOSIS — L89.152 PRESSURE INJURY OF SACRAL REGION, STAGE 2 (MULTI): ICD-10-CM

## 2024-04-22 DIAGNOSIS — M25.551 RIGHT HIP PAIN: ICD-10-CM

## 2024-04-22 DIAGNOSIS — I10 HYPERTENSION, UNSPECIFIED TYPE: Chronic | ICD-10-CM

## 2024-04-22 DIAGNOSIS — E44.0 MALNUTRITION OF MODERATE DEGREE (MULTI): Primary | ICD-10-CM

## 2024-04-22 PROCEDURE — 99309 SBSQ NF CARE MODERATE MDM 30: CPT

## 2024-04-22 RX ORDER — LORAZEPAM 0.5 MG/1
0.5 TABLET ORAL ONCE
Qty: 1 TABLET | Refills: 0 | Status: SHIPPED | OUTPATIENT
Start: 2024-04-22 | End: 2024-04-22

## 2024-04-22 NOTE — ASSESSMENT & PLAN NOTE
most recent RPR 1:1. Reported receiving 2 doses of IM penicillin and received a third dose third dose on 3/11. He follows with ID at [facility].

## 2024-04-22 NOTE — ASSESSMENT & PLAN NOTE
He continues to lose weight and reporting decreased appetite. Increased his Remeron dosing today.

## 2024-04-22 NOTE — PROGRESS NOTES
Visit  Note   Subjective   Vamsi Benson is a 78 y.o. male who is being seen and evaluated for multiple medical problems. Nursing notes, vital signs, and labs were reviewed in the local facility chart.    Patient evaluated today for reports of medication refusals, meal refusal and refusal to complete MRI as well. We discussed his goals for treatment today and he is stating he wishes to improve his health. He is also endorsing some right hip pain today however he states it hurts when he is up and moving.        Objective   There were no vitals taken for this visit.  Physical Exam  Vitals reviewed.   HENT:      Head: Normocephalic.   Cardiovascular:      Rate and Rhythm: Normal rate. Rhythm irregular.   Pulmonary:      Effort: No respiratory distress.      Breath sounds: Normal breath sounds. No wheezing, rhonchi or rales.   Musculoskeletal:         General: No swelling, deformity or signs of injury.      Cervical back: Neck supple.      Right lower leg: No edema.      Left lower leg: No edema.      Comments: RLE: hip FROM. No pain with rotation. No pain with abduction/adduction. No clicking with ROM. No erythema, edema.    Skin:     General: Skin is warm and dry.      Comments: MICHAELA is dressed today; mild swelling in distal phalanges    Neurological:      Mental Status: He is alert.       Assessment/Plan   We had a long discussion about his goals for treatment and his actions regarding his medical plan. Encouraged more fluids, foods, and to take his medication regularly. He has not been taking his medications regularly which is most likely why his INR was subtherapeutic last week. I did not adjust his dosing due to multiple medication refusals last week;  Increasing his remeron to 15mg at bedtime to help support appetite  Right hip pain: recommended taking tylenol; no known mechanism of injury assessment negative  MRI was unable to be completed; ordering a dose of ativan prior to MRI to assist with completing  testing.   Problem List Items Addressed This Visit       Gastrointestinal hemorrhage with melena     S/p egd indicative of ulcers; PPI BID continued for protection. Will trend H/H weekly while he is here         Parkinson's disease (Multi) (Chronic)     Continue sinemet          Malnutrition of moderate degree (Multi) - Primary     He continues to lose weight and reporting decreased appetite. Increased his Remeron dosing today.          Late latent syphilis (Chronic)     most recent RPR 1:1. Reported receiving 2 doses of IM penicillin and received a third dose third dose on 3/11. He follows with ID at [facility].          HTN (hypertension) (Chronic)     Continue amlodipine, hydralazine, metoprolol, lasix         CAD (coronary artery disease) (Chronic)    Atrial fibrillation (Multi)     Managed with metoprolol and coumadin. 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 We will continue his current regimen         Swelling of extremity, right     This appears to be improving. Will continue to monitor while he is now off antibiotics.         Vascular dementia without behavioral disturbance, psychotic disturbance, mood disturbance, or anxiety, unspecified dementia severity (Multi)     No reported mood or behavioral disturbances. Will continue supportive care. Refusing medications periodically.          Pressure injury of sacral region, stage 2 (Multi)     pressure ulcer is noted in the center of the sacrum.  After consultation with the wound care team we will treat with Santyl debriding dressing change daily as well as pressure relief          Other Visit Diagnoses       Right hip pain        Anxiety        Relevant Medications    LORazepam (Ativan) 0.5 mg tablet

## 2024-04-22 NOTE — LETTER
Patient: Vamsi Benson  : 1945    Encounter Date: 2024    Visit  Note   Subjective  Vamsi Benson is a 78 y.o. male who is being seen and evaluated for multiple medical problems. Nursing notes, vital signs, and labs were reviewed in the local facility chart.    Patient evaluated today for reports of medication refusals, meal refusal and refusal to complete MRI as well. We discussed his goals for treatment today and he is stating he wishes to improve his health. He is also endorsing some right hip pain today however he states it hurts when he is up and moving.        Objective  There were no vitals taken for this visit.  Physical Exam  Vitals reviewed.   HENT:      Head: Normocephalic.   Cardiovascular:      Rate and Rhythm: Normal rate. Rhythm irregular.   Pulmonary:      Effort: No respiratory distress.      Breath sounds: Normal breath sounds. No wheezing, rhonchi or rales.   Musculoskeletal:         General: No swelling, deformity or signs of injury.      Cervical back: Neck supple.      Right lower leg: No edema.      Left lower leg: No edema.      Comments: RLE: hip FROM. No pain with rotation. No pain with abduction/adduction. No clicking with ROM. No erythema, edema.    Skin:     General: Skin is warm and dry.      Comments: MICHAELA is dressed today; mild swelling in distal phalanges    Neurological:      Mental Status: He is alert.       Assessment/Plan  We had a long discussion about his goals for treatment and his actions regarding his medical plan. Encouraged more fluids, foods, and to take his medication regularly. He has not been taking his medications regularly which is most likely why his INR was subtherapeutic last week. I did not adjust his dosing due to multiple medication refusals last week;  Increasing his remeron to 15mg at bedtime to help support appetite  Right hip pain: recommended taking tylenol; no known mechanism of injury assessment negative  MRI was unable to be completed;  ordering a dose of ativan prior to MRI to assist with completing testing.   Problem List Items Addressed This Visit       Gastrointestinal hemorrhage with melena     S/p egd indicative of ulcers; PPI BID continued for protection. Will trend H/H weekly while he is here         Parkinson's disease (Multi) (Chronic)     Continue sinemet          Malnutrition of moderate degree (Multi) - Primary     He continues to lose weight and reporting decreased appetite. Increased his Remeron dosing today.          Late latent syphilis (Chronic)     most recent RPR 1:1. Reported receiving 2 doses of IM penicillin and received a third dose third dose on 3/11. He follows with ID at [facility].          HTN (hypertension) (Chronic)     Continue amlodipine, hydralazine, metoprolol, lasix         CAD (coronary artery disease) (Chronic)    Atrial fibrillation (Multi)     Managed with metoprolol and coumadin. 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 We will continue his current regimen         Swelling of extremity, right     This appears to be improving. Will continue to monitor while he is now off antibiotics.         Vascular dementia without behavioral disturbance, psychotic disturbance, mood disturbance, or anxiety, unspecified dementia severity (Multi)     No reported mood or behavioral disturbances. Will continue supportive care. Refusing medications periodically.          Pressure injury of sacral region, stage 2 (Multi)     pressure ulcer is noted in the center of the sacrum.  After consultation with the wound care team we will treat with Santyl debriding dressing change daily as well as pressure relief          Other Visit Diagnoses       Right hip pain        Anxiety        Relevant Medications    LORazepam (Ativan) 0.5 mg tablet                Electronically Signed By: CARLO Mcdaniel-CNP   4/22/24  3:52 PM

## 2024-04-22 NOTE — ASSESSMENT & PLAN NOTE
No reported mood or behavioral disturbances. Will continue supportive care. Refusing medications periodically.

## 2024-04-24 ENCOUNTER — NURSING HOME VISIT (OUTPATIENT)
Dept: POST ACUTE CARE | Facility: EXTERNAL LOCATION | Age: 79
End: 2024-04-24
Payer: MEDICARE

## 2024-04-24 DIAGNOSIS — L89.152 PRESSURE INJURY OF SACRAL REGION, STAGE 2 (MULTI): ICD-10-CM

## 2024-04-24 DIAGNOSIS — A52.8 LATE LATENT SYPHILIS: Chronic | ICD-10-CM

## 2024-04-24 DIAGNOSIS — Z71.89 ENCOUNTER FOR COUNSELING REGARDING ADVANCE DIRECTIVES: ICD-10-CM

## 2024-04-24 DIAGNOSIS — K92.1 GASTROINTESTINAL HEMORRHAGE WITH MELENA: ICD-10-CM

## 2024-04-24 DIAGNOSIS — I25.119 CORONARY ARTERY DISEASE WITH ANGINA PECTORIS, UNSPECIFIED VESSEL OR LESION TYPE, UNSPECIFIED WHETHER NATIVE OR TRANSPLANTED HEART (CMS-HCC): Chronic | ICD-10-CM

## 2024-04-24 DIAGNOSIS — I48.91 ATRIAL FIBRILLATION, UNSPECIFIED TYPE (MULTI): Primary | ICD-10-CM

## 2024-04-24 DIAGNOSIS — E44.0 MALNUTRITION OF MODERATE DEGREE (MULTI): ICD-10-CM

## 2024-04-24 DIAGNOSIS — M65.831 EXTENSOR TENOSYNOVITIS OF RIGHT WRIST: ICD-10-CM

## 2024-04-24 DIAGNOSIS — F01.50 VASCULAR DEMENTIA WITHOUT BEHAVIORAL DISTURBANCE, PSYCHOTIC DISTURBANCE, MOOD DISTURBANCE, OR ANXIETY, UNSPECIFIED DEMENTIA SEVERITY (MULTI): ICD-10-CM

## 2024-04-24 DIAGNOSIS — G20.A1 PARKINSON'S DISEASE, UNSPECIFIED WHETHER DYSKINESIA PRESENT, UNSPECIFIED WHETHER MANIFESTATIONS FLUCTUATE (MULTI): Chronic | ICD-10-CM

## 2024-04-24 PROCEDURE — 99309 SBSQ NF CARE MODERATE MDM 30: CPT

## 2024-04-24 NOTE — PROGRESS NOTES
Visit  Note   Subjective   Vamsi Benson is a 78 y.o. male who is being seen and evaluated for multiple medical problems. Nursing notes, vital signs, and labs were reviewed in the local facility chart.    Patient requesting examination today. He is asking questions regarding his hand and is asking about his hip again as this was addressed with him on Monday. I discussed his labs with him today and the current plan.        Objective   There were no vitals taken for this visit.  Physical Exam  Vitals reviewed.   Constitutional:       Appearance: He is ill-appearing.   HENT:      Head: Normocephalic.   Cardiovascular:      Rate and Rhythm: Normal rate. Rhythm irregular.   Pulmonary:      Effort: No respiratory distress.      Breath sounds: Normal breath sounds. No wheezing, rhonchi or rales.   Musculoskeletal:      Cervical back: Neck supple.   Skin:     General: Skin is warm and dry.   Neurological:      Mental Status: He is alert.      Comments: He is confused on examination and forgetful        Assessment/Plan   Reviewed his labs today; mild dehydration with mild hypokalemia; adding 1L of fluids for gentle rehydration at 50cc/hr. Advised him to take the potassium supplement.   Reviewed with SW, he is refusing multiple treatments, medications and therapies.   Reiterated tylenol for his hip pain. He refused to take the tylenol on Monday; will attempt again.   He is asking about his hand. The MRI was not completed due to his refusal.  Ordered Mini mental status examination for confusion.   We are recommending hospice care at this time as he is not progressing with any treatment goals, refusing to eat or drink as well as refusal to take many of his medications. Family in agreement with hospice services.   INR is therapeutic today at 2.4. continue current dosing  Problem List Items Addressed This Visit       Gastrointestinal hemorrhage with melena     S/p egd indicative of ulcers; PPI BID continued for protection. Will  trend H/H weekly while he is here; currently stable at 8.1         Parkinson's disease (Multi) (Chronic)     Continue sinemet          Malnutrition of moderate degree (Multi)    Late latent syphilis (Chronic)    Extensor tenosynovitis of right wrist    CAD (coronary artery disease) (Chronic)    Atrial fibrillation (Multi) - Primary    Vascular dementia without behavioral disturbance, psychotic disturbance, mood disturbance, or anxiety, unspecified dementia severity (Multi)    Pressure injury of sacral region, stage 2 (Multi)     pressure ulcer is noted in the center of the sacrum.  After consultation with the wound care team we will treat with Santyl debriding dressing change daily as well as pressure relief          Other Visit Diagnoses       Encounter for counseling regarding advance directives

## 2024-04-24 NOTE — ASSESSMENT & PLAN NOTE
S/p egd indicative of ulcers; PPI BID continued for protection. Will trend H/H weekly while he is here; currently stable at 8.1

## 2024-04-24 NOTE — LETTER
Patient: Vamsi Benson  : 1945    Encounter Date: 2024    Visit  Note   Subjective  Vamsi Benson is a 78 y.o. male who is being seen and evaluated for multiple medical problems. Nursing notes, vital signs, and labs were reviewed in the local facility chart.    Patient requesting examination today. He is asking questions regarding his hand and is asking about his hip again as this was addressed with him on Monday. I discussed his labs with him today and the current plan.        Objective  There were no vitals taken for this visit.  Physical Exam  Vitals reviewed.   Constitutional:       Appearance: He is ill-appearing.   HENT:      Head: Normocephalic.   Cardiovascular:      Rate and Rhythm: Normal rate. Rhythm irregular.   Pulmonary:      Effort: No respiratory distress.      Breath sounds: Normal breath sounds. No wheezing, rhonchi or rales.   Musculoskeletal:      Cervical back: Neck supple.   Skin:     General: Skin is warm and dry.   Neurological:      Mental Status: He is alert.      Comments: He is confused on examination and forgetful        Assessment/Plan  Reviewed his labs today; mild dehydration with mild hypokalemia; adding 1L of fluids for gentle rehydration at 50cc/hr. Advised him to take the potassium supplement.   Reviewed with SW, he is refusing multiple treatments, medications and therapies.   Reiterated tylenol for his hip pain. He refused to take the tylenol on Monday; will attempt again.   He is asking about his hand. The MRI was not completed due to his refusal.  Ordered Mini mental status examination for confusion.   We are recommending hospice care at this time as he is not progressing with any treatment goals, refusing to eat or drink as well as refusal to take many of his medications. Family in agreement with hospice services.   Problem List Items Addressed This Visit       Gastrointestinal hemorrhage with melena     S/p egd indicative of ulcers; PPI BID continued for  protection. Will trend H/H weekly while he is here; currently stable at 8.1         Parkinson's disease (Multi) (Chronic)     Continue sinemet          Malnutrition of moderate degree (Multi)    Late latent syphilis (Chronic)    Extensor tenosynovitis of right wrist    CAD (coronary artery disease) (Chronic)    Atrial fibrillation (Multi) - Primary    Vascular dementia without behavioral disturbance, psychotic disturbance, mood disturbance, or anxiety, unspecified dementia severity (Multi)    Pressure injury of sacral region, stage 2 (Multi)     pressure ulcer is noted in the center of the sacrum.  After consultation with the wound care team we will treat with Santyl debriding dressing change daily as well as pressure relief          Other Visit Diagnoses       Encounter for counseling regarding advance directives                   Electronically Signed By: BRENDA Mcdaniel   4/24/24  7:01 PM

## 2024-05-06 ENCOUNTER — HOSPITAL ENCOUNTER (OUTPATIENT)
Dept: RADIOLOGY | Facility: HOSPITAL | Age: 79
End: 2024-05-06
Payer: MEDICARE

## 2024-07-03 ENCOUNTER — TELEPHONE (OUTPATIENT)
Dept: PRIMARY CARE | Facility: CLINIC | Age: 79
End: 2024-07-03
Payer: MEDICARE

## 2024-10-08 NOTE — ASSESSMENT & PLAN NOTE
No reported mood or behavioral disturbances. Will continue supportive care.    Take over the counter pain medication